# Patient Record
Sex: FEMALE | Race: OTHER | Employment: UNEMPLOYED | URBAN - METROPOLITAN AREA
[De-identification: names, ages, dates, MRNs, and addresses within clinical notes are randomized per-mention and may not be internally consistent; named-entity substitution may affect disease eponyms.]

---

## 2017-01-11 ENCOUNTER — APPOINTMENT (OUTPATIENT)
Dept: PHYSICAL THERAPY | Facility: HOSPITAL | Age: 52
End: 2017-01-11
Attending: ORTHOPAEDIC SURGERY
Payer: MEDICAID

## 2017-01-13 ENCOUNTER — OFFICE VISIT (OUTPATIENT)
Dept: PHYSICAL THERAPY | Facility: HOSPITAL | Age: 52
End: 2017-01-13
Attending: ORTHOPAEDIC SURGERY
Payer: MEDICAID

## 2017-01-13 PROCEDURE — 97110 THERAPEUTIC EXERCISES: CPT

## 2017-01-13 NOTE — PROGRESS NOTES
Dx: Rotator cuff tendinitis, right(M75.81); cervical strain(S16.1XXA)       Authorized # of Visit: 5/7         Next MD visit: none scheduled  Fall Risk: standard         Precautions: n/a           Medication Changes since last visit?: No  Subjective: Patie

## 2017-02-07 ENCOUNTER — OFFICE VISIT (OUTPATIENT)
Dept: OTOLARYNGOLOGY | Facility: CLINIC | Age: 52
End: 2017-02-07

## 2017-02-07 VITALS
TEMPERATURE: 97 F | HEART RATE: 76 BPM | WEIGHT: 156 LBS | HEIGHT: 63 IN | DIASTOLIC BLOOD PRESSURE: 60 MMHG | RESPIRATION RATE: 20 BRPM | BODY MASS INDEX: 27.64 KG/M2 | SYSTOLIC BLOOD PRESSURE: 90 MMHG

## 2017-02-07 DIAGNOSIS — R42 DIZZINESS: Primary | ICD-10-CM

## 2017-02-07 PROCEDURE — 99212 OFFICE O/P EST SF 10 MIN: CPT | Performed by: OTOLARYNGOLOGY

## 2017-02-07 PROCEDURE — 99214 OFFICE O/P EST MOD 30 MIN: CPT | Performed by: OTOLARYNGOLOGY

## 2017-02-07 RX ORDER — FLUTICASONE PROPIONATE 50 MCG
1 SPRAY, SUSPENSION (ML) NASAL 2 TIMES DAILY
Qty: 1 BOTTLE | Refills: 3 | Status: SHIPPED | OUTPATIENT
Start: 2017-02-07 | End: 2017-09-21

## 2017-02-07 RX ORDER — MONTELUKAST SODIUM 10 MG/1
10 TABLET ORAL NIGHTLY
Qty: 30 TABLET | Refills: 3 | Status: SHIPPED | OUTPATIENT
Start: 2017-02-07 | End: 2017-09-21

## 2017-02-07 RX ORDER — LORATADINE 10 MG/1
10 TABLET ORAL DAILY
Qty: 30 TABLET | Refills: 3 | Status: SHIPPED | OUTPATIENT
Start: 2017-02-07 | End: 2017-09-21

## 2017-02-07 RX ORDER — CLONAZEPAM 0.5 MG/1
0.5 TABLET ORAL AS NEEDED
Qty: 30 TABLET | Refills: 6 | Status: SHIPPED | OUTPATIENT
Start: 2017-02-07 | End: 2017-08-03

## 2017-03-02 ENCOUNTER — TELEPHONE (OUTPATIENT)
Dept: INTERNAL MEDICINE CLINIC | Facility: CLINIC | Age: 52
End: 2017-03-02

## 2017-03-02 ENCOUNTER — HOSPITAL ENCOUNTER (OUTPATIENT)
Dept: MAMMOGRAPHY | Facility: HOSPITAL | Age: 52
Discharge: HOME | End: 2017-03-02
Attending: OBSTETRICS & GYNECOLOGY
Payer: MEDICAID

## 2017-03-02 ENCOUNTER — APPOINTMENT (OUTPATIENT)
Dept: ULTRASOUND IMAGING | Facility: HOSPITAL | Age: 52
End: 2017-03-02
Attending: OBSTETRICS & GYNECOLOGY
Payer: MEDICAID

## 2017-03-02 ENCOUNTER — HOSPITAL ENCOUNTER (OUTPATIENT)
Dept: ULTRASOUND IMAGING | Facility: HOSPITAL | Age: 52
Discharge: HOME | End: 2017-03-02
Attending: OBSTETRICS & GYNECOLOGY
Payer: MEDICAID

## 2017-03-02 DIAGNOSIS — N92.1 MENORRHAGIA WITH IRREGULAR CYCLE: ICD-10-CM

## 2017-03-02 DIAGNOSIS — Z12.31 ENCOUNTER FOR SCREENING MAMMOGRAM FOR BREAST CANCER: ICD-10-CM

## 2017-03-02 DIAGNOSIS — N92.1 MENORRHAGIA WITH IRREGULAR CYCLE: Primary | ICD-10-CM

## 2017-03-02 PROCEDURE — 76856 US EXAM PELVIC COMPLETE: CPT

## 2017-03-02 PROCEDURE — 76830 TRANSVAGINAL US NON-OB: CPT

## 2017-03-02 PROCEDURE — 77067 SCR MAMMO BI INCL CAD: CPT

## 2017-03-02 NOTE — TELEPHONE ENCOUNTER
Patient reports that she needs to schedule her follow-up appt with  [facility] to discuss US results. She has an appt with Dr Amelia Wilson on 3/9 but is hoping to get an appt on the books with Dr Waller Sever [facility] ASAP.  Needs referral.

## 2017-03-03 NOTE — PROGRESS NOTES
Quick Note:    Pelvic u/s stable from previous u/s.  If periods continue to be prolonged, need e-bx  ______

## 2017-03-09 ENCOUNTER — OFFICE VISIT (OUTPATIENT)
Dept: INTERNAL MEDICINE CLINIC | Facility: CLINIC | Age: 52
End: 2017-03-09

## 2017-03-09 VITALS
TEMPERATURE: 98 F | WEIGHT: 155.63 LBS | BODY MASS INDEX: 28 KG/M2 | SYSTOLIC BLOOD PRESSURE: 117 MMHG | HEART RATE: 90 BPM | DIASTOLIC BLOOD PRESSURE: 84 MMHG

## 2017-03-09 DIAGNOSIS — N39.0 URINARY TRACT INFECTION, SITE UNSPECIFIED: Primary | ICD-10-CM

## 2017-03-09 DIAGNOSIS — H53.8 BLURRY VISION: ICD-10-CM

## 2017-03-09 DIAGNOSIS — Z71.2 ENCOUNTER TO DISCUSS TEST RESULTS: ICD-10-CM

## 2017-03-09 LAB
BILIRUBIN: NEGATIVE
GLUCOSE (URINE DIPSTICK): NEGATIVE MG/DL
KETONES (URINE DIPSTICK): NEGATIVE MG/DL
LEUKOCYTES: POSITIVE
MULTISTIX LOT#: ABNORMAL NUMERIC
NITRITE, URINE: NEGATIVE
OCCULT BLOOD: POSITIVE
PH, URINE: 6 (ref 4.5–8)
PROTEIN (URINE DIPSTICK): NEGATIVE MG/DL
SPECIFIC GRAVITY: 1.02 (ref 1–1.03)
URINE-COLOR: YELLOW
UROBILINOGEN,SEMI-QN: 0 MG/DL (ref 0–1.9)

## 2017-03-09 PROCEDURE — 99214 OFFICE O/P EST MOD 30 MIN: CPT | Performed by: INTERNAL MEDICINE

## 2017-03-09 PROCEDURE — 81002 URINALYSIS NONAUTO W/O SCOPE: CPT | Performed by: INTERNAL MEDICINE

## 2017-03-09 PROCEDURE — 99212 OFFICE O/P EST SF 10 MIN: CPT | Performed by: INTERNAL MEDICINE

## 2017-03-10 NOTE — PROGRESS NOTES
HPI:    Patient ID: Anupam Luna is a 46year old female. The patient requests a referral to OB/GYN to discuss test results. HPI  UTI  This is a chronic problem. Episode onset: more than 3 months ago. The problem occurs constantly.  Pertinent negat Current Outpatient Prescriptions:  ClonazePAM 0.5 MG Oral Tab Take 1 tablet (0.5 mg total) by mouth as needed. Disp: 30 tablet Rfl: 6   Montelukast Sodium 10 MG Oral Tab Take 1 tablet (10 mg total) by mouth nightly.  Disp: 30 tablet Rfl: 3   loratadine 10 for bilateral flank tenderness on palpation. Plan:  - Drink plenty of liquids, especially water. Empty your bladder soon after intercourse. Also, drink a full glass of water to help flush bacteria. Avoid potentially irritating feminine products.    - The

## 2017-03-15 ENCOUNTER — TELEPHONE (OUTPATIENT)
Dept: INTERNAL MEDICINE CLINIC | Facility: CLINIC | Age: 52
End: 2017-03-15

## 2017-03-15 NOTE — TELEPHONE ENCOUNTER
Patient would like to know the results of her labs and if you were going to send her antibiotics. Please advise.

## 2017-03-16 NOTE — TELEPHONE ENCOUNTER
Urine culture was negative meaning that the patient does not have a urinary tract infection. No need for antibiotics. Please call the patient with the results. Thanks.

## 2017-03-17 NOTE — TELEPHONE ENCOUNTER
Actions Requested: MD notification/ possible advice  Situation/Background   Problem: left side of back/ intermittent stabbing   Onset: yesterday   Associated Symptoms: none   History of Same: none   Precipitated By: Pt states that she was shoveling snow th

## 2017-03-20 ENCOUNTER — TELEPHONE (OUTPATIENT)
Dept: INTERNAL MEDICINE CLINIC | Facility: CLINIC | Age: 52
End: 2017-03-20

## 2017-03-20 NOTE — TELEPHONE ENCOUNTER
Pt states she thinks has the flu, states all she needs is antibiotic zpack or if possible to add her to schedule, pt only wants to see PCP. Please advise.

## 2017-03-20 NOTE — TELEPHONE ENCOUNTER
Actions Requested:   Pt is requesting medication to pharmacy. Situation/Background    Problem:  Cough / body aches   Onset:  Wednesday   Associated Symptoms:     History of Same:    Precipitated By:    Aggravating/Alleviating Factors:    Timing:    Possi

## 2017-03-21 ENCOUNTER — TELEPHONE (OUTPATIENT)
Dept: INTERNAL MEDICINE CLINIC | Facility: CLINIC | Age: 52
End: 2017-03-21

## 2017-03-21 ENCOUNTER — TELEPHONE (OUTPATIENT)
Dept: OPHTHALMOLOGY | Facility: CLINIC | Age: 52
End: 2017-03-21

## 2017-03-21 ENCOUNTER — OFFICE VISIT (OUTPATIENT)
Dept: INTERNAL MEDICINE CLINIC | Facility: CLINIC | Age: 52
End: 2017-03-21

## 2017-03-21 VITALS
OXYGEN SATURATION: 99 % | TEMPERATURE: 98 F | HEART RATE: 89 BPM | BODY MASS INDEX: 28 KG/M2 | DIASTOLIC BLOOD PRESSURE: 69 MMHG | WEIGHT: 158 LBS | HEIGHT: 63 IN | SYSTOLIC BLOOD PRESSURE: 100 MMHG

## 2017-03-21 DIAGNOSIS — IMO0001 COLD: Primary | ICD-10-CM

## 2017-03-21 DIAGNOSIS — J34.89 SINUS PRESSURE: ICD-10-CM

## 2017-03-21 PROCEDURE — 99213 OFFICE O/P EST LOW 20 MIN: CPT | Performed by: INTERNAL MEDICINE

## 2017-03-21 PROCEDURE — 99212 OFFICE O/P EST SF 10 MIN: CPT | Performed by: INTERNAL MEDICINE

## 2017-03-21 RX ORDER — OMEGA-3 FATTY ACIDS/FISH OIL 300-1000MG
CAPSULE ORAL
COMMUNITY
End: 2017-08-17 | Stop reason: ALTCHOICE

## 2017-03-21 NOTE — TELEPHONE ENCOUNTER
Pt stts she was in today to see the MD   Pt was advised a pain med for her arm for her left arm will be sent to the pharm   Please advise

## 2017-03-21 NOTE — PROGRESS NOTES
Sick x 6 days   Had a fever now gone  Also wants results mammogram, urine culture, etc     03/21/17  1500   BP: 100/69   Pulse: 89   Temp: 97.6 °F (36.4 °C)   TempSrc: Oral   Height: 5' 3\" (1.6 m)   Weight: 158 lb (71.668 kg)   SpO2: 99%         Body mass

## 2017-03-21 NOTE — PATIENT INSTRUCTIONS
chlortrimaton 4 mg twice a day  Sudafed 120 mg twice a day  Robitussin as directed    Carpal Tunnel Brace Left Hand Medium

## 2017-03-22 RX ORDER — NAPROXEN 500 MG/1
500 TABLET ORAL 2 TIMES DAILY
Qty: 180 TABLET | Refills: 3 | Status: SHIPPED | OUTPATIENT
Start: 2017-03-22 | End: 2017-03-29

## 2017-03-22 RX ORDER — AMOXICILLIN AND CLAVULANATE POTASSIUM 500; 125 MG/1; MG/1
1 TABLET, FILM COATED ORAL
Qty: 20 TABLET | Refills: 0 | Status: SHIPPED | OUTPATIENT
Start: 2017-03-22 | End: 2017-04-18

## 2017-03-22 RX ORDER — BENZONATATE 100 MG/1
100 CAPSULE ORAL 3 TIMES DAILY PRN
Qty: 15 CAPSULE | Refills: 0 | Status: SHIPPED | OUTPATIENT
Start: 2017-03-22 | End: 2017-08-17 | Stop reason: ALTCHOICE

## 2017-03-22 NOTE — TELEPHONE ENCOUNTER
Pt stts you sent over an Rx for Naproxen but pt can't take it because of the aspirin in it. Hard on her stomach. Pt would like something else please plus something for her cold and cough. Feeling much worse.

## 2017-03-23 RX ORDER — TRAMADOL HYDROCHLORIDE 50 MG/1
50 TABLET ORAL EVERY 6 HOURS PRN
Qty: 30 TABLET | Refills: 1 | Status: SHIPPED | OUTPATIENT
Start: 2017-03-23 | End: 2017-08-17 | Stop reason: ALTCHOICE

## 2017-03-29 ENCOUNTER — OFFICE VISIT (OUTPATIENT)
Dept: INTERNAL MEDICINE CLINIC | Facility: CLINIC | Age: 52
End: 2017-03-29

## 2017-03-29 VITALS
HEIGHT: 63 IN | SYSTOLIC BLOOD PRESSURE: 101 MMHG | RESPIRATION RATE: 16 BRPM | HEART RATE: 82 BPM | WEIGHT: 155.5 LBS | BODY MASS INDEX: 27.55 KG/M2 | DIASTOLIC BLOOD PRESSURE: 68 MMHG

## 2017-03-29 DIAGNOSIS — J06.9 ACUTE URI: Primary | ICD-10-CM

## 2017-03-29 PROCEDURE — 99214 OFFICE O/P EST MOD 30 MIN: CPT | Performed by: INTERNAL MEDICINE

## 2017-03-29 PROCEDURE — 99212 OFFICE O/P EST SF 10 MIN: CPT | Performed by: INTERNAL MEDICINE

## 2017-03-29 RX ORDER — CODEINE PHOSPHATE AND GUAIFENESIN 10; 100 MG/5ML; MG/5ML
10 SOLUTION ORAL EVERY 6 HOURS PRN
Qty: 236 ML | Refills: 0 | Status: SHIPPED | OUTPATIENT
Start: 2017-03-29 | End: 2017-08-17 | Stop reason: ALTCHOICE

## 2017-03-29 RX ORDER — AZITHROMYCIN 250 MG/1
TABLET, FILM COATED ORAL
Qty: 1 PACKAGE | Refills: 0 | Status: SHIPPED | OUTPATIENT
Start: 2017-03-29 | End: 2017-04-18

## 2017-03-29 NOTE — PROGRESS NOTES
HPI:    Patient ID: Artem Castillo is a 46year old female. HPI  Cough  This is a new problem. Episode onset: 3 weeks agao. The problem has been gradually improving. The problem occurs every few minutes. The cough is non-productive.  Associated symptom equivalent per week       Comment: socially          Current Outpatient Prescriptions:  azithromycin (ZITHROMAX Z-EMY) 250 MG Oral Tab Take 2 tablets first day. Then 1 tablet daily after.  Disp: 1 Package Rfl: 0   guaiFENesin-codeine (CHERATUSSIN AC) 100-10 moist. No oropharyngeal exudate or posterior oropharyngeal erythema. Eyes: Conjunctivae are normal.   Neck: Normal range of motion. Cardiovascular: Normal rate, regular rhythm and normal heart sounds. Exam reveals no gallop and no friction rub.     No daily after. guaiFENesin-codeine (CHERATUSSIN AC) 100-10 MG/5ML Oral Solution 236 mL 0      Sig: Take 10 mL by mouth every 6 (six) hours as needed for cough.        Imaging & Referrals:  None     ID#[id number]  By signing my name below, I [address],

## 2017-04-03 ENCOUNTER — TELEPHONE (OUTPATIENT)
Dept: INTERNAL MEDICINE CLINIC | Facility: CLINIC | Age: 52
End: 2017-04-03

## 2017-04-03 NOTE — TELEPHONE ENCOUNTER
[facility] ok to [facility] [phone] today only or [phone].

## 2017-04-03 NOTE — TELEPHONE ENCOUNTER
Pt calling regarding having issue with her knee and pt state that tramadol does not help with the inflammation. Pt does not have name of medication. .. please advise

## 2017-04-07 NOTE — TELEPHONE ENCOUNTER
[facility], please transfer to x  until 5 today or x [phone] anytime.

## 2017-04-11 NOTE — TELEPHONE ENCOUNTER
[phone].     Copied and placed on 2nd Medication question on 4/3/17

## 2017-04-11 NOTE — TELEPHONE ENCOUNTER
[phone]. Also informed to call us back if still needed. Taken from 2nd Medication question on 4/3/17:    Pt calling regarding why she was giving a fungus medication. .. please advise

## 2017-04-18 ENCOUNTER — OFFICE VISIT (OUTPATIENT)
Dept: OPHTHALMOLOGY | Facility: CLINIC | Age: 52
End: 2017-04-18

## 2017-04-18 DIAGNOSIS — H40.003 GLAUCOMA SUSPECT OF BOTH EYES: Primary | ICD-10-CM

## 2017-04-18 DIAGNOSIS — H52.4 BILATERAL PRESBYOPIA: ICD-10-CM

## 2017-04-18 PROCEDURE — 99212 OFFICE O/P EST SF 10 MIN: CPT | Performed by: OPHTHALMOLOGY

## 2017-04-18 PROCEDURE — 99243 OFF/OP CNSLTJ NEW/EST LOW 30: CPT | Performed by: OPHTHALMOLOGY

## 2017-04-18 RX ORDER — FLUCONAZOLE 150 MG/1
TABLET ORAL
COMMUNITY
Start: 2017-03-29 | End: 2017-08-17 | Stop reason: ALTCHOICE

## 2017-04-18 NOTE — PATIENT INSTRUCTIONS
Glaucoma suspect of both eyes  Patient is a glaucoma suspect due to increased cupping of the optic nerves in both eyes. Patient had normal glaucoma diagnostics last year. IOP is normal today.   There is no diagnosis of glaucoma at this time, but johanna voss

## 2017-04-18 NOTE — PROGRESS NOTES
Favian Valdez is a 46year old female. HPI:     HPI     Consult    Additional comments: Consult per Dr. Crystal Nunez           Comments   Pt denies any blurred vision OU at distance and is happy with her OTC reading glasses. Pt has no ocular complaints. mouth. Disp:  Rfl:    ClonazePAM 0.5 MG Oral Tab Take 1 tablet (0.5 mg total) by mouth as needed. Disp: 30 tablet Rfl: 6   Montelukast Sodium 10 MG Oral Tab Take 1 tablet (10 mg total) by mouth nightly.  Disp: 30 tablet Rfl: 3   loratadine 10 MG Oral Tab Ta Good rim, Temporal crescent    C/D Ratio 0.75 0.65    Macula RPE hypo superonasal to fovea Normal    Vessels Normal Normal    Periphery Normal Normal            Refraction     Wearing Rx      Sphere Cylinder   Right +1.25 Sphere   Left +1.25 Sphere       T

## 2017-05-09 ENCOUNTER — TELEPHONE (OUTPATIENT)
Dept: ORTHOPEDICS CLINIC | Facility: CLINIC | Age: 52
End: 2017-05-09

## 2017-08-03 RX ORDER — CLONAZEPAM 0.5 MG/1
TABLET ORAL
Qty: 90 TABLET | Refills: 0 | Status: SHIPPED | OUTPATIENT
Start: 2017-08-03 | End: 2017-10-29

## 2017-08-03 NOTE — TELEPHONE ENCOUNTER
LOV 2/7/17  Last refill 2/17/17   RTC 3-6 months  appt scheduled 8/8/17  Please advise regarding refill request

## 2017-08-08 ENCOUNTER — OFFICE VISIT (OUTPATIENT)
Dept: OTOLARYNGOLOGY | Facility: CLINIC | Age: 52
End: 2017-08-08

## 2017-08-08 VITALS
SYSTOLIC BLOOD PRESSURE: 100 MMHG | WEIGHT: 162.19 LBS | HEIGHT: 63 IN | DIASTOLIC BLOOD PRESSURE: 70 MMHG | BODY MASS INDEX: 28.74 KG/M2 | TEMPERATURE: 98 F

## 2017-08-08 DIAGNOSIS — R42 DIZZINESS: Primary | ICD-10-CM

## 2017-08-08 PROCEDURE — 99212 OFFICE O/P EST SF 10 MIN: CPT | Performed by: OTOLARYNGOLOGY

## 2017-08-08 PROCEDURE — 99214 OFFICE O/P EST MOD 30 MIN: CPT | Performed by: OTOLARYNGOLOGY

## 2017-08-08 NOTE — PROGRESS NOTES
Magda Paredes is a 46year old female.   Patient presents with:  Dizziness: diagnosied with vestibular disorder 2001, feeling both eyes and thong still jumping      HISTORY OF PRESENT ILLNESS  She presents with complaints of snoring, nasal obstruction as Spouse name:                       Years of education:                 Number of children:               Occupational History  Occupation          Employer            Comment               LPN                                         Social History Main Top depression. Integumentary Negative Frequent skin infections, pigment change and rash. Hema/Lymph Negative Easy bleeding and easy bruising.            PHYSICAL EXAM    /70 (BP Location: Left arm)   Temp 97.7 °F (36.5 °C) (Tympanic)   Ht 5' 3\" (1.6 3  •  Fluticasone Propionate 50 MCG/ACT Nasal Suspension, 1 spray by Nasal route 2 (two) times daily. , Disp: 1 Bottle, Rfl: 3  •  OMEPRAZOLE 40 MG Oral Capsule Delayed Release, TAKE ONE CAPSULE BY MOUTH ONCE DAILY, Disp: 30 capsule, Rfl: 3  •  fluconazole

## 2017-08-17 ENCOUNTER — OFFICE VISIT (OUTPATIENT)
Dept: INTERNAL MEDICINE CLINIC | Facility: CLINIC | Age: 52
End: 2017-08-17

## 2017-08-17 VITALS
HEART RATE: 77 BPM | WEIGHT: 159.38 LBS | TEMPERATURE: 98 F | DIASTOLIC BLOOD PRESSURE: 78 MMHG | SYSTOLIC BLOOD PRESSURE: 111 MMHG | BODY MASS INDEX: 28 KG/M2

## 2017-08-17 DIAGNOSIS — J06.9 URI, ACUTE: Primary | ICD-10-CM

## 2017-08-17 DIAGNOSIS — R53.83 FATIGUE, UNSPECIFIED TYPE: ICD-10-CM

## 2017-08-17 PROCEDURE — 99214 OFFICE O/P EST MOD 30 MIN: CPT | Performed by: INTERNAL MEDICINE

## 2017-08-17 PROCEDURE — 99212 OFFICE O/P EST SF 10 MIN: CPT | Performed by: INTERNAL MEDICINE

## 2017-08-17 RX ORDER — AZITHROMYCIN 250 MG/1
TABLET, FILM COATED ORAL
Qty: 1 PACKAGE | Refills: 0 | Status: SHIPPED | OUTPATIENT
Start: 2017-08-17 | End: 2017-09-21

## 2017-08-18 NOTE — PROGRESS NOTES
HPI:    Patient ID: Penny Romo is a 46year old female. Sore Throat    This is a new problem. The current episode started in the past 7 days. There has been no fever. Associated symptoms include abdominal pain.  Pertinent negatives include no short nightly. Disp: 30 tablet Rfl: 3   loratadine 10 MG Oral Tab Take 1 tablet (10 mg total) by mouth daily. Disp: 30 tablet Rfl: 3   Fluticasone Propionate 50 MCG/ACT Nasal Suspension 1 spray by Nasal route 2 (two) times daily.  Disp: 1 Bottle Rfl: 3   OMEPRAZO , ASSAY, THYROID STIM HORMONE            Orders Placed This Encounter      CBC With Differential With Platelet      Comp Metabolic Panel (14)      TSH [E]    Meds This Visit:  Signed Prescriptions Disp Refills    azithromycin (ZITHROMAX Z-EMY) 250 MG Oral

## 2017-09-02 ENCOUNTER — LAB ENCOUNTER (OUTPATIENT)
Dept: LAB | Facility: HOSPITAL | Age: 52
End: 2017-09-02
Attending: INTERNAL MEDICINE
Payer: COMMERCIAL

## 2017-09-02 DIAGNOSIS — R53.83 FATIGUE, UNSPECIFIED TYPE: ICD-10-CM

## 2017-09-02 LAB
ALBUMIN SERPL BCP-MCNC: 4.4 G/DL (ref 3.5–4.8)
ALBUMIN/GLOB SERPL: 1.5 {RATIO} (ref 1–2)
ALP SERPL-CCNC: 69 U/L (ref 32–100)
ALT SERPL-CCNC: 48 U/L (ref 14–54)
ANION GAP SERPL CALC-SCNC: 8 MMOL/L (ref 0–18)
AST SERPL-CCNC: 31 U/L (ref 15–41)
BASOPHILS # BLD: 0 K/UL (ref 0–0.2)
BASOPHILS NFR BLD: 0 %
BILIRUB SERPL-MCNC: 0.5 MG/DL (ref 0.3–1.2)
BUN SERPL-MCNC: 15 MG/DL (ref 8–20)
BUN/CREAT SERPL: 20.8 (ref 10–20)
CALCIUM SERPL-MCNC: 9.6 MG/DL (ref 8.5–10.5)
CHLORIDE SERPL-SCNC: 106 MMOL/L (ref 95–110)
CO2 SERPL-SCNC: 25 MMOL/L (ref 22–32)
CREAT SERPL-MCNC: 0.72 MG/DL (ref 0.5–1.5)
EOSINOPHIL # BLD: 0.2 K/UL (ref 0–0.7)
EOSINOPHIL NFR BLD: 4 %
ERYTHROCYTE [DISTWIDTH] IN BLOOD BY AUTOMATED COUNT: 13.8 % (ref 11–15)
GLOBULIN PLAS-MCNC: 3 G/DL (ref 2.5–3.7)
GLUCOSE SERPL-MCNC: 90 MG/DL (ref 70–99)
HCT VFR BLD AUTO: 42.1 % (ref 35–48)
HGB BLD-MCNC: 13.8 G/DL (ref 12–16)
LYMPHOCYTES # BLD: 2.1 K/UL (ref 1–4)
LYMPHOCYTES NFR BLD: 37 %
MCH RBC QN AUTO: 28.6 PG (ref 27–32)
MCHC RBC AUTO-ENTMCNC: 32.8 G/DL (ref 32–37)
MCV RBC AUTO: 87.1 FL (ref 80–100)
MONOCYTES # BLD: 0.5 K/UL (ref 0–1)
MONOCYTES NFR BLD: 8 %
NEUTROPHILS # BLD AUTO: 2.9 K/UL (ref 1.8–7.7)
NEUTROPHILS NFR BLD: 51 %
OSMOLALITY UR CALC.SUM OF ELEC: 288 MOSM/KG (ref 275–295)
PLATELET # BLD AUTO: 218 K/UL (ref 140–400)
PMV BLD AUTO: 8.9 FL (ref 7.4–10.3)
POTASSIUM SERPL-SCNC: 3.9 MMOL/L (ref 3.3–5.1)
PROT SERPL-MCNC: 7.4 G/DL (ref 5.9–8.4)
RBC # BLD AUTO: 4.84 M/UL (ref 3.7–5.4)
SODIUM SERPL-SCNC: 139 MMOL/L (ref 136–144)
TSH SERPL-ACNC: 2.17 UIU/ML (ref 0.45–5.33)
WBC # BLD AUTO: 5.7 K/UL (ref 4–11)

## 2017-09-02 PROCEDURE — 36415 COLL VENOUS BLD VENIPUNCTURE: CPT

## 2017-09-02 PROCEDURE — 85025 COMPLETE CBC W/AUTO DIFF WBC: CPT

## 2017-09-02 PROCEDURE — 84443 ASSAY THYROID STIM HORMONE: CPT

## 2017-09-02 PROCEDURE — 80053 COMPREHEN METABOLIC PANEL: CPT

## 2017-09-20 ENCOUNTER — TELEPHONE (OUTPATIENT)
Dept: INTERNAL MEDICINE CLINIC | Facility: CLINIC | Age: 52
End: 2017-09-20

## 2017-09-21 ENCOUNTER — HOSPITAL ENCOUNTER (OUTPATIENT)
Age: 52
Discharge: HOME | End: 2017-09-21
Attending: FAMILY MEDICINE
Payer: COMMERCIAL

## 2017-09-21 VITALS
HEART RATE: 73 BPM | WEIGHT: 160 LBS | SYSTOLIC BLOOD PRESSURE: 110 MMHG | DIASTOLIC BLOOD PRESSURE: 66 MMHG | TEMPERATURE: 98 F | BODY MASS INDEX: 28.35 KG/M2 | OXYGEN SATURATION: 100 % | HEIGHT: 63 IN | RESPIRATION RATE: 16 BRPM

## 2017-09-21 DIAGNOSIS — H00.011 HORDEOLUM EXTERNUM OF RIGHT UPPER EYELID: Primary | ICD-10-CM

## 2017-09-21 PROCEDURE — 99203 OFFICE O/P NEW LOW 30 MIN: CPT

## 2017-09-21 PROCEDURE — 99213 OFFICE O/P EST LOW 20 MIN: CPT

## 2017-09-21 RX ORDER — ERYTHROMYCIN 5 MG/G
1 OINTMENT OPHTHALMIC NIGHTLY
Qty: 1 G | Refills: 0 | Status: SHIPPED | OUTPATIENT
Start: 2017-09-21 | End: 2017-09-28

## 2017-09-21 NOTE — ED INITIAL ASSESSMENT (HPI)
Several days of pain, redness, and swelling to right upper eyelid. Mild tearing. Does not wear corrective lenses. Mild discharge from eye.

## 2017-09-21 NOTE — ED PROVIDER NOTES
Patient Seen in: [address] [facility]    History   Patient presents with:  Eyelid Pain    Stated Complaint: Rt eye swelling     HPI    Patient here with complaints of right upper eyelid swelling and pain.   First noted swelling on S Wt 72.6 kg   SpO2 100%   BMI 28.34 kg/m²     Right Eye Chart Acuity: 20/25, Uncorrected  Left Eye Chart Acuity: 20/25, Uncorrected    Physical Exam    General examination no acute distress alert and oriented ×3    Right upper eyelid stye noted in the middl

## 2017-09-22 NOTE — TELEPHONE ENCOUNTER
Recent blood test for TSH /thyroid test was normal at 2.17. Normal comprehensive metabolic panel. Normal CBC. Please call the patient with the results. Thanks.

## 2017-09-30 ENCOUNTER — HOSPITAL ENCOUNTER (OUTPATIENT)
Age: 52
Discharge: HOME | End: 2017-09-30
Payer: COMMERCIAL

## 2017-09-30 VITALS
HEIGHT: 63 IN | OXYGEN SATURATION: 99 % | HEART RATE: 74 BPM | BODY MASS INDEX: 28 KG/M2 | TEMPERATURE: 98 F | DIASTOLIC BLOOD PRESSURE: 78 MMHG | WEIGHT: 158 LBS | RESPIRATION RATE: 18 BRPM | SYSTOLIC BLOOD PRESSURE: 112 MMHG

## 2017-09-30 DIAGNOSIS — N30.00 ACUTE CYSTITIS WITHOUT HEMATURIA: Primary | ICD-10-CM

## 2017-09-30 PROCEDURE — 99213 OFFICE O/P EST LOW 20 MIN: CPT

## 2017-09-30 PROCEDURE — 81002 URINALYSIS NONAUTO W/O SCOPE: CPT

## 2017-09-30 RX ORDER — CIPROFLOXACIN 500 MG/1
500 TABLET, FILM COATED ORAL 2 TIMES DAILY
Qty: 14 TABLET | Refills: 0 | Status: SHIPPED | OUTPATIENT
Start: 2017-09-30 | End: 2017-10-07

## 2017-09-30 NOTE — ED PROVIDER NOTES
Patient presents with:  Urinary Symptoms (urologic)      HPI:     Porter Liz is a 46year old female who presents with a chief complaint of urea, urgency, frequency, and suprapubic tenderness with urination for the past week.   She states the symptoms /78   Pulse 74   Temp 97.8 °F (36.6 °C) (Oral)   Resp 18   Ht 160 cm (5' 3\")   Wt 71.7 kg   SpO2 99%   BMI 27.99 kg/m²   GENERAL: well developed, well nourished, well hydrated, no distress  SKIN: good skin turgor, no rashes  HEENT:normocephalic, your condition today.     Follow Up with:  Hillary Ceron MD  [address]  [phone]    Schedule an appointment as soon as possible for a visit in 2 days

## 2017-09-30 NOTE — ED INITIAL ASSESSMENT (HPI)
1 week of intermittent dysuria. More constant yesterday. + frequency. Lower abdominal pain and pressure. + chills. No nausea. Dizzy and headache at times. Denies hematuria.

## 2017-10-06 RX ORDER — OMEPRAZOLE 40 MG/1
CAPSULE, DELAYED RELEASE ORAL
Qty: 30 CAPSULE | Refills: 3 | Status: SHIPPED | OUTPATIENT
Start: 2017-10-06 | End: 2018-04-19

## 2017-10-06 NOTE — TELEPHONE ENCOUNTER
Pending Prescriptions Disp Refills    OMEPRAZOLE 40 MG Oral Capsule Delayed Release [Pharmacy Med Name: OMEPRAZOLE DR 40MG  CAP] 30 capsule 3     Sig: TAKE ONE CAPSULE BY MOUTH ONCE DAILY           Refill approved per protocol.

## 2017-10-06 NOTE — TELEPHONE ENCOUNTER
Refill Protocol Appointment Criteria  · Appointment scheduled in the past 12 months or in the next 3 months  Recent Outpatient Visits            1 month ago [place], [facility], [facility], [address], Miryam Jj MD    Office Visit    1 month ag

## 2017-11-02 RX ORDER — CLONAZEPAM 0.5 MG/1
TABLET ORAL
Qty: 180 TABLET | Refills: 0 | Status: SHIPPED | OUTPATIENT
Start: 2017-11-02 | End: 2018-04-17

## 2018-04-09 ENCOUNTER — OFFICE VISIT (OUTPATIENT)
Dept: INTERNAL MEDICINE CLINIC | Facility: CLINIC | Age: 53
End: 2018-04-09

## 2018-04-09 VITALS
DIASTOLIC BLOOD PRESSURE: 84 MMHG | BODY MASS INDEX: 28.23 KG/M2 | HEART RATE: 92 BPM | SYSTOLIC BLOOD PRESSURE: 116 MMHG | HEIGHT: 63 IN | WEIGHT: 159.31 LBS | TEMPERATURE: 99 F

## 2018-04-09 DIAGNOSIS — Z00.00 ANNUAL PHYSICAL EXAM: Primary | ICD-10-CM

## 2018-04-09 DIAGNOSIS — Z12.39 SCREENING FOR BREAST CANCER: ICD-10-CM

## 2018-04-09 DIAGNOSIS — M25.562 CHRONIC PAIN OF LEFT KNEE: ICD-10-CM

## 2018-04-09 DIAGNOSIS — G89.29 CHRONIC PAIN OF LEFT KNEE: ICD-10-CM

## 2018-04-09 DIAGNOSIS — Z12.83 SCREENING FOR SKIN CANCER: ICD-10-CM

## 2018-04-09 PROBLEM — D25.9 FIBROID UTERUS: Status: ACTIVE | Noted: 2018-04-09

## 2018-04-09 PROBLEM — J34.89 SINUS PRESSURE: Status: RESOLVED | Noted: 2017-03-21 | Resolved: 2018-04-09

## 2018-04-09 PROBLEM — N83.201 RIGHT OVARIAN CYST: Status: ACTIVE | Noted: 2018-04-09

## 2018-04-09 PROBLEM — IMO0001 COLD: Status: RESOLVED | Noted: 2017-03-21 | Resolved: 2018-04-09

## 2018-04-09 PROCEDURE — 99396 PREV VISIT EST AGE 40-64: CPT | Performed by: INTERNAL MEDICINE

## 2018-04-09 RX ORDER — BENZONATATE 200 MG/1
200 CAPSULE ORAL 3 TIMES DAILY PRN
Qty: 30 CAPSULE | Refills: 0 | Status: SHIPPED | OUTPATIENT
Start: 2018-04-09 | End: 2018-04-19

## 2018-04-09 NOTE — PROGRESS NOTES
Marilia Marshall is a 46year old female. Patient presents with:  Physical      HPI:     HPI  51-year-old female here for annual physical. She reports she has been feeling sharp pain in the occipital area for last 5 months.   She was diagnosed with glaucom 0.0 oz/week     Comment: socially       REVIEW OF SYSTEMS:   DESIRAE  GENERAL HEALTH: No fevers, chills, sweats, fatigue. VISION: has pain and pressure in both eyes, worse in rt eyes. Has difficulty while looking at bright light.   HEENT: No decreased hearing rhythm, without murmur, rubs or gallops. GI: normal bowel sounds ,no tenderness or masses, no guarding or rigidity.   EXTREMITIES: No pedal edema, bilateral pulses normal.  MUSK: b/l knee crepitus present, no swelling or effusions  BACK: normal curvature,n MG Oral Cap; Take 1 capsule (200 mg total) by mouth 3 (three) times daily as needed for cough. The patient indicates understanding of these issues and agrees to the plan.               Anshu Jansen MD  4/9/2018  6:59 PM

## 2018-04-12 ENCOUNTER — OFFICE VISIT (OUTPATIENT)
Dept: OPHTHALMOLOGY | Facility: CLINIC | Age: 53
End: 2018-04-12

## 2018-04-12 ENCOUNTER — TELEPHONE (OUTPATIENT)
Dept: OPHTHALMOLOGY | Facility: CLINIC | Age: 53
End: 2018-04-12

## 2018-04-12 DIAGNOSIS — H57.13 EYE PAIN, BILATERAL: Primary | ICD-10-CM

## 2018-04-12 PROCEDURE — 99213 OFFICE O/P EST LOW 20 MIN: CPT | Performed by: OPHTHALMOLOGY

## 2018-04-12 PROCEDURE — 99212 OFFICE O/P EST SF 10 MIN: CPT | Performed by: OPHTHALMOLOGY

## 2018-04-12 NOTE — PROGRESS NOTES
Shima Ritchie is a 46year old female. HPI:     HPI     Eye Pain     In both eyes (OD>OS). Characterized as burning, irritation and pressure. Pain was noted as 6/10. Occurring intermittently. It is worse throughout the day. Duration of 3 months. Known Allergies    ROS:       PHYSICAL EXAM:     Base Eye Exam     Visual Acuity (Snellen - Linear)       Right Left    Dist sc 20/20 -2 20/25          Pachymetry (12/19/2015)       Right Left    Thickness 613/-5 636/-6          Pupils       Pupils    Righ encounter     Follow up instructions:  Return in about 4 weeks (around 5/12/2018) for EE and photos.     4/12/2018  Scribed by: Sameer Collado MD

## 2018-04-12 NOTE — ASSESSMENT & PLAN NOTE
No cause found for eye pain. Reassured patient that there is no infection, inflammation, foreign body, abrasion or increased pressure in the eyes.      Discussed with patient that she is due for a dilated eye exam and photos in the near future due to stat

## 2018-04-12 NOTE — PATIENT INSTRUCTIONS
Eye pain, bilateral   No cause found for eye pain. Reassured patient that there is no infection, inflammation, foreign body, abrasion or increased pressure in the eyes.      Discussed with patient that she is due for a dilated eye exam and photos in the ne

## 2018-04-17 ENCOUNTER — HOSPITAL ENCOUNTER (OUTPATIENT)
Dept: GENERAL RADIOLOGY | Age: 53
Discharge: HOME | End: 2018-04-17
Attending: INTERNAL MEDICINE
Payer: MEDICAID

## 2018-04-17 ENCOUNTER — LAB ENCOUNTER (OUTPATIENT)
Dept: LAB | Age: 53
End: 2018-04-17
Attending: INTERNAL MEDICINE
Payer: MEDICAID

## 2018-04-17 ENCOUNTER — HOSPITAL ENCOUNTER (OUTPATIENT)
Dept: MAMMOGRAPHY | Age: 53
Discharge: HOME | End: 2018-04-17
Attending: INTERNAL MEDICINE
Payer: MEDICAID

## 2018-04-17 ENCOUNTER — OFFICE VISIT (OUTPATIENT)
Dept: OBGYN CLINIC | Facility: CLINIC | Age: 53
End: 2018-04-17

## 2018-04-17 ENCOUNTER — OFFICE VISIT (OUTPATIENT)
Dept: OTOLARYNGOLOGY | Facility: CLINIC | Age: 53
End: 2018-04-17

## 2018-04-17 VITALS
SYSTOLIC BLOOD PRESSURE: 108 MMHG | WEIGHT: 160 LBS | BODY MASS INDEX: 28 KG/M2 | HEART RATE: 82 BPM | DIASTOLIC BLOOD PRESSURE: 71 MMHG

## 2018-04-17 VITALS
TEMPERATURE: 99 F | BODY MASS INDEX: 28.35 KG/M2 | SYSTOLIC BLOOD PRESSURE: 118 MMHG | HEIGHT: 63 IN | WEIGHT: 160 LBS | DIASTOLIC BLOOD PRESSURE: 70 MMHG

## 2018-04-17 DIAGNOSIS — Z12.39 SCREENING FOR BREAST CANCER: ICD-10-CM

## 2018-04-17 DIAGNOSIS — G89.29 CHRONIC PAIN OF LEFT KNEE: ICD-10-CM

## 2018-04-17 DIAGNOSIS — Z00.00 ANNUAL PHYSICAL EXAM: ICD-10-CM

## 2018-04-17 DIAGNOSIS — M25.562 CHRONIC PAIN OF LEFT KNEE: ICD-10-CM

## 2018-04-17 DIAGNOSIS — Z01.419 ENCOUNTER FOR GYNECOLOGICAL EXAMINATION WITHOUT ABNORMAL FINDING: Primary | ICD-10-CM

## 2018-04-17 DIAGNOSIS — N39.3 SUI (STRESS URINARY INCONTINENCE, FEMALE): ICD-10-CM

## 2018-04-17 DIAGNOSIS — N95.1 MENOPAUSAL HOT FLUSHES: ICD-10-CM

## 2018-04-17 DIAGNOSIS — Z12.4 SCREENING FOR MALIGNANT NEOPLASM OF CERVIX: ICD-10-CM

## 2018-04-17 DIAGNOSIS — R42 DIZZINESS: Primary | ICD-10-CM

## 2018-04-17 PROCEDURE — 80061 LIPID PANEL: CPT

## 2018-04-17 PROCEDURE — 77067 SCR MAMMO BI INCL CAD: CPT | Performed by: INTERNAL MEDICINE

## 2018-04-17 PROCEDURE — 99214 OFFICE O/P EST MOD 30 MIN: CPT | Performed by: OTOLARYNGOLOGY

## 2018-04-17 PROCEDURE — 99212 OFFICE O/P EST SF 10 MIN: CPT | Performed by: OTOLARYNGOLOGY

## 2018-04-17 PROCEDURE — 73564 X-RAY EXAM KNEE 4 OR MORE: CPT | Performed by: INTERNAL MEDICINE

## 2018-04-17 PROCEDURE — 80053 COMPREHEN METABOLIC PANEL: CPT

## 2018-04-17 PROCEDURE — 84443 ASSAY THYROID STIM HORMONE: CPT

## 2018-04-17 PROCEDURE — 99396 PREV VISIT EST AGE 40-64: CPT | Performed by: OBSTETRICS & GYNECOLOGY

## 2018-04-17 PROCEDURE — 82306 VITAMIN D 25 HYDROXY: CPT

## 2018-04-17 PROCEDURE — 36415 COLL VENOUS BLD VENIPUNCTURE: CPT

## 2018-04-17 PROCEDURE — 83036 HEMOGLOBIN GLYCOSYLATED A1C: CPT

## 2018-04-17 PROCEDURE — 85025 COMPLETE CBC W/AUTO DIFF WBC: CPT

## 2018-04-17 RX ORDER — FLUTICASONE PROPIONATE 50 MCG
1 SPRAY, SUSPENSION (ML) NASAL 2 TIMES DAILY
Qty: 1 BOTTLE | Refills: 3 | Status: SHIPPED | OUTPATIENT
Start: 2018-04-17 | End: 2018-08-04

## 2018-04-17 RX ORDER — CLONAZEPAM 0.5 MG/1
TABLET ORAL
Qty: 180 TABLET | Refills: 0 | Status: SHIPPED | OUTPATIENT
Start: 2018-04-17 | End: 2018-11-04

## 2018-04-17 RX ORDER — LORATADINE 10 MG/1
10 TABLET ORAL DAILY
Qty: 30 TABLET | Refills: 3 | Status: SHIPPED | OUTPATIENT
Start: 2018-04-17 | End: 2019-07-11

## 2018-04-17 RX ORDER — MONTELUKAST SODIUM 10 MG/1
10 TABLET ORAL NIGHTLY
Qty: 30 TABLET | Refills: 3 | Status: SHIPPED | OUTPATIENT
Start: 2018-04-17 | End: 2019-06-10 | Stop reason: ALTCHOICE

## 2018-04-18 NOTE — H&P
HPI:  The patient is a 24-year-old female who presents for woman examination today. Multiple gynecologic complaints. The patient's been complaining of experiencing hot flashes. She says it occurs multiple times a day.   She denies any decreased libido Other Topics Concern    Caffeine Concern Yes    Comment: coffee, 1cup/day    Pt has a pacemaker No    Pt has a defibrillator No    Reaction to local anesthetic No     Social History Narrative   None on file       FAMILY HISTORY:  Family History   Probl nourished  Head/Face: normocephalic  Neck/Thyroid: thyroid symmetric, no thyromegaly, no nodules, no adenopathy  Heart: Regular rate and rhythm   Lungs: clear to ascultation bilaterally   Lymphatic:no abnormal supraclavicular or axillary adenopathy is note within normal limits. We will be able to discuss SSRIs versus HRT pending results for mammogram.  3. Breast Health:     1. Reviewed currently guidelines with the patient and to start Mammograms at age 43  3.  Reviewed monthly self breast exams with the pat

## 2018-04-18 NOTE — PROGRESS NOTES
Maureen Tapia is a 46year old female.   Patient presents with:  Nose Problem: pt reports nasal congestion, can not breath through nose, post nasal drip, phlegm, need refill on medication,   Dizziness: pt reports dizziness still      HISTORY OF PRESENT I Ménière's.   4/17/18 since I last saw her she has been dealing with a lot of stress anxiety and has increased her use of clonazepam to 0.5 mg p.o. twice daily as needed. Generally she uses 1 tablet in fact will go days without using a tablet at all.   She SYSTEMS    System Neg/Pos Details   Constitutional Negative Fatigue, fever and weight loss. ENMT Negative Drooling. Eyes Negative Blurred vision and vision changes. Respiratory Negative Dyspnea and wheezing.    Cardio Negative Chest pain, irregular he Normal. Septum -Normal  Turbinates - Right: Normal, Left: Normal.       Current Outpatient Prescriptions:   •  ClonazePAM 0.5 MG Oral Tab, TAKE ONE TABLET BY MOUTH ONCE DAILY AS NEEDED, Disp: 180 tablet, Rfl: 0  •  Montelukast Sodium 10 MG Oral Tab, Take 1

## 2018-04-19 ENCOUNTER — HOSPITAL ENCOUNTER (OUTPATIENT)
Dept: ULTRASOUND IMAGING | Facility: HOSPITAL | Age: 53
Discharge: HOME | End: 2018-04-19
Attending: INTERNAL MEDICINE
Payer: MEDICAID

## 2018-04-19 ENCOUNTER — HOSPITAL ENCOUNTER (OUTPATIENT)
Dept: MAMMOGRAPHY | Facility: HOSPITAL | Age: 53
Discharge: HOME | End: 2018-04-19
Attending: INTERNAL MEDICINE
Payer: MEDICAID

## 2018-04-19 ENCOUNTER — TELEPHONE (OUTPATIENT)
Dept: INTERNAL MEDICINE CLINIC | Facility: CLINIC | Age: 53
End: 2018-04-19

## 2018-04-19 DIAGNOSIS — R92.8 ABNORMAL MAMMOGRAM OF RIGHT BREAST: ICD-10-CM

## 2018-04-19 PROCEDURE — 77065 DX MAMMO INCL CAD UNI: CPT | Performed by: INTERNAL MEDICINE

## 2018-04-19 PROCEDURE — 76642 ULTRASOUND BREAST LIMITED: CPT | Performed by: INTERNAL MEDICINE

## 2018-04-19 RX ORDER — OMEPRAZOLE 40 MG/1
CAPSULE, DELAYED RELEASE ORAL
Qty: 30 CAPSULE | Refills: 3 | Status: SHIPPED | OUTPATIENT
Start: 2018-04-19 | End: 2019-03-19

## 2018-04-19 RX ORDER — BENZONATATE 200 MG/1
200 CAPSULE ORAL 3 TIMES DAILY PRN
Qty: 30 CAPSULE | Refills: 0 | Status: SHIPPED | OUTPATIENT
Start: 2018-04-19 | End: 2018-04-29

## 2018-04-23 ENCOUNTER — TELEPHONE (OUTPATIENT)
Dept: GASTROENTEROLOGY | Facility: CLINIC | Age: 53
End: 2018-04-23

## 2018-04-23 ENCOUNTER — OFFICE VISIT (OUTPATIENT)
Dept: GASTROENTEROLOGY | Facility: CLINIC | Age: 53
End: 2018-04-23

## 2018-04-23 VITALS
BODY MASS INDEX: 28.92 KG/M2 | HEIGHT: 62.5 IN | SYSTOLIC BLOOD PRESSURE: 116 MMHG | WEIGHT: 161.19 LBS | HEART RATE: 87 BPM | DIASTOLIC BLOOD PRESSURE: 77 MMHG

## 2018-04-23 DIAGNOSIS — R13.19 OTHER DYSPHAGIA: ICD-10-CM

## 2018-04-23 DIAGNOSIS — K21.00 GASTROESOPHAGEAL REFLUX DISEASE WITH ESOPHAGITIS: ICD-10-CM

## 2018-04-23 DIAGNOSIS — R13.10 DYSPHAGIA, UNSPECIFIED TYPE: Primary | ICD-10-CM

## 2018-04-23 DIAGNOSIS — K21.9 GASTROESOPHAGEAL REFLUX DISEASE, ESOPHAGITIS PRESENCE NOT SPECIFIED: ICD-10-CM

## 2018-04-23 DIAGNOSIS — R14.0 ABDOMINAL BLOATING: Primary | ICD-10-CM

## 2018-04-23 PROCEDURE — 99212 OFFICE O/P EST SF 10 MIN: CPT | Performed by: INTERNAL MEDICINE

## 2018-04-23 PROCEDURE — 99244 OFF/OP CNSLTJ NEW/EST MOD 40: CPT | Performed by: INTERNAL MEDICINE

## 2018-04-23 RX ORDER — POLYETHYLENE GLYCOL 3350, SODIUM CHLORIDE, SODIUM BICARBONATE, POTASSIUM CHLORIDE 420; 11.2; 5.72; 1.48 G/4L; G/4L; G/4L; G/4L
POWDER, FOR SOLUTION ORAL
Qty: 1 BOTTLE | Refills: 0 | Status: SHIPPED | OUTPATIENT
Start: 2018-04-23 | End: 2018-08-04

## 2018-04-23 NOTE — H&P
[address] [facility] [address] Gastroenterology                                                                                                  Clinic History and Physical     Pa Diabetes Sister      type 2 - half (P)   • Colon Cancer Neg      close relative   • Glaucoma Neg    • Macular degeneration Neg       Social History: Smoking status: Current Some Day Smoker                                                    Packs/day: 0.10 161 lb 3.2 oz (73.1 kg), unknown if currently breastfeeding.     Gen- Patient appears comfortable and in no acute discomfort  HEENT: the sclera appears anicteric, oropharynx clear, mucus membranes appear moist  CV- regular rate and rhythm, the extremities a hospitalization, surgical intervention, or even death. I also specifically mentioned the miss rate of upper endoscopy of 5-10% in the best of all circumstances.   The patient has agreed to sign an informed consent and elected to proceed with procedure with

## 2018-04-23 NOTE — PATIENT INSTRUCTIONS
1. Schedule endoscopy (MAC) at hospital (dysphagia/GERD). With possible dilation  2. Bowel prep to flush out stools  3.  After bowel , the next day start miralax 1-2x a day (1 capful with water)

## 2018-04-23 NOTE — TELEPHONE ENCOUNTER
Scheduled for:  Colonoscopy 51348--Aok Dr. Martinez Garza this will be a 45 min procedure  Provider Name: Dr. Mratinez Garza  Date:  5/29/18  Location:  [facility]  Sedation:  MAC  Time:  1878 (pt is aware to arrive at [phone])   Prep:  Nothing to eat after midnight   Nothing to drin

## 2018-04-25 PROBLEM — R14.0 ABDOMINAL BLOATING: Status: ACTIVE | Noted: 2018-04-25

## 2018-04-25 PROBLEM — K21.00 GASTROESOPHAGEAL REFLUX DISEASE WITH ESOPHAGITIS: Status: ACTIVE | Noted: 2018-04-25

## 2018-04-25 PROBLEM — R13.19 OTHER DYSPHAGIA: Status: ACTIVE | Noted: 2018-04-25

## 2018-05-11 ENCOUNTER — HOSPITAL ENCOUNTER (OUTPATIENT)
Age: 53
Discharge: HOME | End: 2018-05-11
Payer: MEDICAID

## 2018-05-11 VITALS
HEIGHT: 63 IN | WEIGHT: 158 LBS | RESPIRATION RATE: 18 BRPM | OXYGEN SATURATION: 99 % | DIASTOLIC BLOOD PRESSURE: 71 MMHG | HEART RATE: 99 BPM | TEMPERATURE: 98 F | SYSTOLIC BLOOD PRESSURE: 114 MMHG | BODY MASS INDEX: 28 KG/M2

## 2018-05-11 DIAGNOSIS — N30.90 CYSTITIS: Primary | ICD-10-CM

## 2018-05-11 PROCEDURE — 87077 CULTURE AEROBIC IDENTIFY: CPT | Performed by: NURSE PRACTITIONER

## 2018-05-11 PROCEDURE — 99214 OFFICE O/P EST MOD 30 MIN: CPT

## 2018-05-11 PROCEDURE — 87086 URINE CULTURE/COLONY COUNT: CPT | Performed by: NURSE PRACTITIONER

## 2018-05-11 PROCEDURE — 87186 SC STD MICRODIL/AGAR DIL: CPT | Performed by: NURSE PRACTITIONER

## 2018-05-11 PROCEDURE — 81002 URINALYSIS NONAUTO W/O SCOPE: CPT

## 2018-05-11 RX ORDER — CIPROFLOXACIN 500 MG/1
500 TABLET, FILM COATED ORAL 2 TIMES DAILY
Qty: 14 TABLET | Refills: 0 | Status: SHIPPED | OUTPATIENT
Start: 2018-05-11 | End: 2018-05-18

## 2018-05-11 NOTE — ED INITIAL ASSESSMENT (HPI)
PATIENT ARRIVED AMBULATORY TO ROOM C/O SYMPTOMS OF A UTI THAT STARTED 2 WEEKS AGO WORSENING OVER THE PAST 2 DAYS. +FEVERS YESTERDAY. +LOWER ABDOMINAL PAIN AND LOWER BACK PAIN. +PAINFUL URINATION. +FREQUENCY. +URGENCY. NO HEMATURIA.

## 2018-05-11 NOTE — ED PROVIDER NOTES
Patient presents with:  Urinary Symptoms (urologic)      HPI:     Penny Romo is a 46year old female who presents with a chief complaint of dysuria, urgency, and frequency for the past couple weeks. She states the symptoms gradually became worse.   Osvaldo Melvin Temp 98.4 °F (36.9 °C) (Oral)   Resp 18   Ht 160 cm (5' 3\")   Wt 71.7 kg   SpO2 99%   BMI 27.99 kg/m²   GENERAL: well developed, well nourished, well hydrated, no distress  SKIN: good skin turgor, no rashes  HEENT:normocephalic, ears and throat are clear and discussed with patient. See AVS for detailed discharge instructions for your condition today. Follow Up with:  Martha Pham MD  [address] [address] [phone]    Schedule an appointment as soon as possible for a visit in 2

## 2018-05-12 ENCOUNTER — OFFICE VISIT (OUTPATIENT)
Dept: OPHTHALMOLOGY | Facility: CLINIC | Age: 53
End: 2018-05-12

## 2018-05-12 ENCOUNTER — OFFICE VISIT (OUTPATIENT)
Dept: OBGYN CLINIC | Facility: CLINIC | Age: 53
End: 2018-05-12

## 2018-05-12 VITALS
HEART RATE: 81 BPM | DIASTOLIC BLOOD PRESSURE: 66 MMHG | BODY MASS INDEX: 28 KG/M2 | SYSTOLIC BLOOD PRESSURE: 100 MMHG | WEIGHT: 156 LBS

## 2018-05-12 DIAGNOSIS — H52.4 BILATERAL PRESBYOPIA: ICD-10-CM

## 2018-05-12 DIAGNOSIS — H40.003 GLAUCOMA SUSPECT OF BOTH EYES: Primary | ICD-10-CM

## 2018-05-12 DIAGNOSIS — N95.1 MENOPAUSAL SYMPTOMS: Primary | ICD-10-CM

## 2018-05-12 PROCEDURE — 92014 COMPRE OPH EXAM EST PT 1/>: CPT | Performed by: OPHTHALMOLOGY

## 2018-05-12 PROCEDURE — 99213 OFFICE O/P EST LOW 20 MIN: CPT | Performed by: OBSTETRICS & GYNECOLOGY

## 2018-05-12 PROCEDURE — 92250 FUNDUS PHOTOGRAPHY W/I&R: CPT | Performed by: OPHTHALMOLOGY

## 2018-05-12 NOTE — H&P
HPI:  The patient is a 80-year-old female who presents with her  to discuss menopausal symptoms. The patient states that she has been experiencing hot flushes, difficulty sleeping and decreased libido.   She states her decreased libido is affectin Topics   Smoking status: Current Some Day Smoker  0.10 Packs/day  For 20.00 Years     Types: Cigarettes    Smokeless tobacco: Never Used    Alcohol use Yes  0.0 oz/week     Comment: socially    Drug use: No    Sexual activity: No     Other Topics Concern vaginal discharge, vaginal itching  Musculoskeletal:  denies back pain. Skin/Breast:  Denies any breast pain, lumps, or discharge. Neurological:  denies headaches, extremity weakness  Psychiatric: denies depression or anxiety.        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 The patient states that she finds pleasure when sexually active and has energy over the weekend however after her long days at work she is too tired to be intimate.   We did discuss changing their schedule to be more conduciv

## 2018-05-12 NOTE — PATIENT INSTRUCTIONS
Glaucoma suspect of both eyes  Discussed with patient that she is a glaucoma suspect based on increased cupping of the optic nerves in both eyes. Retinal photos taken today to document optic nerves. Glaucoma diagnostic testing ordered.   Will not start me [facility] [address] [employer] [place]. All rights reserved. This information is not intended as a substitute for professional medical care. Always follow your healthcare professional's instructions.         Treating Glaucoma    Treatment can prevent or Truett Elo

## 2018-05-19 ENCOUNTER — NURSE ONLY (OUTPATIENT)
Dept: OPHTHALMOLOGY | Facility: CLINIC | Age: 53
End: 2018-05-19

## 2018-05-19 DIAGNOSIS — H40.003 GLAUCOMA SUSPECT OF BOTH EYES: ICD-10-CM

## 2018-05-19 PROCEDURE — 92083 EXTENDED VISUAL FIELD XM: CPT | Performed by: OPHTHALMOLOGY

## 2018-05-19 PROCEDURE — 92133 CPTRZD OPH DX IMG PST SGM ON: CPT | Performed by: OPHTHALMOLOGY

## 2018-05-29 ENCOUNTER — TELEPHONE (OUTPATIENT)
Dept: GASTROENTEROLOGY | Facility: CLINIC | Age: 53
End: 2018-05-29

## 2018-05-29 DIAGNOSIS — K21.9 GASTROESOPHAGEAL REFLUX DISEASE, ESOPHAGITIS PRESENCE NOT SPECIFIED: ICD-10-CM

## 2018-05-29 DIAGNOSIS — R13.10 DYSPHAGIA, UNSPECIFIED TYPE: Primary | ICD-10-CM

## 2018-05-29 NOTE — TELEPHONE ENCOUNTER
Pt calling to inform staff that EGD/CANCELLED, pt requesting a cb to kapil, pls call at:[phone],thanks.

## 2018-05-29 NOTE — TELEPHONE ENCOUNTER
Scheduled for:  Colonoscopy 89277  Provider Name: Dr. Lawrence Graham  Date:  8/8/18  Location:  [facility]  Sedation:  MAC  Time:  1000 (pt is aware to arrive at 0900)   Prep:  NPO after midnight, mailed new instructions on 5/30/18  Meds/Allergies Reconciled?:  Physician

## 2018-05-29 NOTE — PROGRESS NOTES
Lisa Laehy is a 46year old female. HPI:     HPI     Patient is here for a glaucoma work up with HVF and OCT, no M.D.     Last edited by Tiki Mcfarland on 5/19/2018 11:06 AM. (History)        Patient History:  Past Medical History:   Diagnosis Date 3       Allergies:  No Known Allergies    ROS:       PHYSICAL EXAM:      Not recorded           ASSESSMENT/PLAN:     Diagnoses and Plan:     Glaucoma suspect of both eyes  Normal VF, OCT, OU.       No orders of the defined types were placed in this encounte

## 2018-05-29 NOTE — PROGRESS NOTES
Pradeep Cole is a 46year old female. HPI:     HPI     Patient is here for a complete exam and photos. Patient still has occasional pain behind both eyes. She denies any burning or irritation.       Last edited by Matthew Bynum OT on 5/12/2018  9 Nasal Suspension 1 spray by Nasal route 2 (two) times daily.  Disp: 1 Bottle Rfl: 3       Allergies:  No Known Allergies    ROS:       PHYSICAL EXAM:     Base Eye Exam     Visual Acuity (Snellen - Linear)       Right Left    Dist sc 20/20 20/20    Near cc 2 Treadwell Visual Field - OU - Both Eyes      OCT, Optic Nerve - OU - Both Eyes    Meds This Visit:    No prescriptions requested or ordered in this encounter     Follow up instructions:  Return for Next avail.  VF and OCT with no MD, If glaucoma diagnostics

## 2018-08-04 ENCOUNTER — OFFICE VISIT (OUTPATIENT)
Dept: INTERNAL MEDICINE CLINIC | Facility: CLINIC | Age: 53
End: 2018-08-04
Payer: MEDICAID

## 2018-08-04 VITALS
BODY MASS INDEX: 28.71 KG/M2 | SYSTOLIC BLOOD PRESSURE: 121 MMHG | DIASTOLIC BLOOD PRESSURE: 81 MMHG | HEART RATE: 88 BPM | WEIGHT: 160 LBS | HEIGHT: 62.5 IN

## 2018-08-04 DIAGNOSIS — R51.9 HEADACHE DISORDER: ICD-10-CM

## 2018-08-04 DIAGNOSIS — H00.015 HORDEOLUM EXTERNUM OF LEFT LOWER EYELID: ICD-10-CM

## 2018-08-04 DIAGNOSIS — Z87.898 HISTORY OF SEIZURE: Primary | ICD-10-CM

## 2018-08-04 PROCEDURE — 99212 OFFICE O/P EST SF 10 MIN: CPT | Performed by: INTERNAL MEDICINE

## 2018-08-04 PROCEDURE — 99214 OFFICE O/P EST MOD 30 MIN: CPT | Performed by: INTERNAL MEDICINE

## 2018-08-04 NOTE — PROGRESS NOTES
Patient ID: Lisa Leahy is a 48year old female. Patient presents with:  Headache: started Monday tried motrin did not help much, no pain today       HISTORY OF PRESENT ILLNESS:   HPI  Patient presents for above.   Here with episodic headaches for th •  loratadine 10 MG Oral Tab, Take 1 tablet (10 mg total) by mouth daily. , Disp: 30 tablet, Rfl: 3    Allergies:No Known Allergies      Social History  Social History   Marital status:    Spouse name: N/A    Years of education: N/A  Number of childre · Keep area clean. · Handout given on effective home remedies. · Suspect this is probably the underlying cause to her symptoms for the past several days. Return if symptoms worsen or fail to improve.     Samuel Schuler MD  8/4/2018

## 2018-08-04 NOTE — PATIENT INSTRUCTIONS
1. Begin Excedrin Migraine. Headache, Unspecified    A number of things can cause headaches. The cause of your headache isn’t clear. But it doesn’t seem to be a sign of any serious illness. You could have a tension headache or a migraine headache.   Stre Follow up with your healthcare provider, or as advised. Talk with your provider if you have frequent headaches. He or she can help figure out a treatment plan.  By knowing the earliest signs of headache, and starting treatment right away, you may be able to · Apply a warm, damp towel to the affected eye for at least 5 minutes, 3 to 4 times a day for a week. Warm compresses open the pores and speed the healing. But if the compresses are too hot, they may burn your eyelid.   · Sometimes the sty will drain with t

## 2018-08-06 ENCOUNTER — OFFICE VISIT (OUTPATIENT)
Dept: DERMATOLOGY CLINIC | Facility: CLINIC | Age: 53
End: 2018-08-06
Payer: MEDICAID

## 2018-08-06 DIAGNOSIS — D22.9 MULTIPLE NEVI: ICD-10-CM

## 2018-08-06 DIAGNOSIS — D23.70 BENIGN NEOPLASM OF SKIN OF LOWER LIMB, INCLUDING HIP, UNSPECIFIED LATERALITY: ICD-10-CM

## 2018-08-06 DIAGNOSIS — D23.4 BENIGN NEOPLASM OF SCALP AND SKIN OF NECK: ICD-10-CM

## 2018-08-06 DIAGNOSIS — D23.30 BENIGN NEOPLASM OF SKIN OF FACE: ICD-10-CM

## 2018-08-06 DIAGNOSIS — L82.1 SEBORRHEIC KERATOSES: Primary | ICD-10-CM

## 2018-08-06 DIAGNOSIS — D23.5 BENIGN NEOPLASM OF SKIN OF TRUNK, EXCEPT SCROTUM: ICD-10-CM

## 2018-08-06 DIAGNOSIS — D23.60 BENIGN NEOPLASM OF SKIN OF UPPER LIMB, INCLUDING SHOULDER, UNSPECIFIED LATERALITY: ICD-10-CM

## 2018-08-06 PROCEDURE — 99202 OFFICE O/P NEW SF 15 MIN: CPT | Performed by: DERMATOLOGY

## 2018-08-06 PROCEDURE — 99212 OFFICE O/P EST SF 10 MIN: CPT | Performed by: DERMATOLOGY

## 2018-08-06 RX ORDER — KETOCONAZOLE 20 MG/G
CREAM TOPICAL
Qty: 60 G | Refills: 3 | Status: SHIPPED | OUTPATIENT
Start: 2018-08-06 | End: 2019-06-10 | Stop reason: ALTCHOICE

## 2018-08-08 ENCOUNTER — HOSPITAL ENCOUNTER (OUTPATIENT)
Facility: HOSPITAL | Age: 53
Setting detail: HOSPITAL OUTPATIENT SURGERY
Discharge: HOME | End: 2018-08-08
Attending: INTERNAL MEDICINE | Admitting: INTERNAL MEDICINE
Payer: MEDICAID

## 2018-08-08 ENCOUNTER — SURGERY (OUTPATIENT)
Age: 53
End: 2018-08-08

## 2018-08-08 ENCOUNTER — ANESTHESIA EVENT (OUTPATIENT)
Dept: ENDOSCOPY | Facility: HOSPITAL | Age: 53
End: 2018-08-08
Payer: MEDICAID

## 2018-08-08 ENCOUNTER — ANESTHESIA (OUTPATIENT)
Dept: ENDOSCOPY | Facility: HOSPITAL | Age: 53
End: 2018-08-08
Payer: MEDICAID

## 2018-08-08 DIAGNOSIS — R13.10 DYSPHAGIA, UNSPECIFIED TYPE: ICD-10-CM

## 2018-08-08 DIAGNOSIS — K21.9 GASTROESOPHAGEAL REFLUX DISEASE, ESOPHAGITIS PRESENCE NOT SPECIFIED: ICD-10-CM

## 2018-08-08 PROCEDURE — 43239 EGD BIOPSY SINGLE/MULTIPLE: CPT | Performed by: INTERNAL MEDICINE

## 2018-08-08 PROCEDURE — 0DB68ZX EXCISION OF STOMACH, VIA NATURAL OR ARTIFICIAL OPENING ENDOSCOPIC, DIAGNOSTIC: ICD-10-PCS | Performed by: INTERNAL MEDICINE

## 2018-08-08 RX ORDER — SODIUM CHLORIDE, SODIUM LACTATE, POTASSIUM CHLORIDE, CALCIUM CHLORIDE 600; 310; 30; 20 MG/100ML; MG/100ML; MG/100ML; MG/100ML
INJECTION, SOLUTION INTRAVENOUS CONTINUOUS
Status: DISCONTINUED | OUTPATIENT
Start: 2018-08-08 | End: 2018-08-08

## 2018-08-08 RX ADMIN — SODIUM CHLORIDE, SODIUM LACTATE, POTASSIUM CHLORIDE, CALCIUM CHLORIDE: 600; 310; 30; 20 INJECTION, SOLUTION INTRAVENOUS at 09:56:00

## 2018-08-08 RX ADMIN — SODIUM CHLORIDE, SODIUM LACTATE, POTASSIUM CHLORIDE, CALCIUM CHLORIDE: 600; 310; 30; 20 INJECTION, SOLUTION INTRAVENOUS at 10:06:00

## 2018-08-08 NOTE — OPERATIVE REPORT
ESOPHAGOGASTRODUODENOSCOPY (EGD) REPORT    Nerissa Skiff    LIZA 1965 Age 48year old   PCP Rishi Webster MD Endoscopist Carlos Esteves MD     Date of procedure: 18    Procedure: EGD w/cold biopsy    Pre-operative diagnosis: GERD, dyspha or mass seen. 2. Normal appearing stomach and duodenum. Recommend:  1. Take omeprazole 40mg/day consistently. 2. Await pathology. 3. Return to clinic in 6 months.    4. Behavioral changes for GERD.     >>>If biopsies were performed and you have not r

## 2018-08-08 NOTE — ANESTHESIA PREPROCEDURE EVALUATION
Anesthesia PreOp Note    HPI:     Lisa Leahy is a 48year old female who presents for preoperative consultation requested by: Bruna Montalvo MD    Date of Surgery: 8/8/2018    Procedure(s):  ESOPHAGOGASTRODUODENOSCOPY (EGD)  Indication: Maurilio Arzate MOUTH ONCE DAILY AS NEEDED (Patient taking differently: 0.5 mg. Take half daily ) Disp: 180 tablet Rfl: 0 Taking   Montelukast Sodium 10 MG Oral Tab Take 1 tablet (10 mg total) by mouth nightly.  Disp: 30 tablet Rfl: 3 Taking   loratadine 10 MG Oral Tab Juventino Graham comment: Hx of SVT- has not had an issue in years    Neuro/Psych      GI/Hepatic/Renal      Endo/Other    Abdominal              Anesthesia Plan:   ASA:  2  Plan:   General  Informed Consent Plan and Risks Discussed With:  Patient      I have informed Patricia

## 2018-08-08 NOTE — ANESTHESIA POSTPROCEDURE EVALUATION
Patient: Diane Green    Procedure Summary     Date:  08/08/18 Room / Location:  [address] [place] [address] ENDOSCOPY 01 / [address] [place] [address] ENDOSCOPY    Anesthesia Start:  3649 Anesthesia Stop:  2324    Procedure:  ESOPHAGOGASTRODUODENOSCOPY (EGD) (N/A ) Diagnosis:       Gastroesophageal

## 2018-08-08 NOTE — H&P
History & Physical Examination    Patient Name: [facility]  MRN: [id number]  CSN: [id number]  YOB: 1965    Diagnosis: TNWB,HORYBOHDN      Prescriptions Prior to Admission:  ketoconazole 2 % External Cream Apply to affected area 2 times oz/week    1 Standard drinks or equivalent per week         Comment: socially       SYSTEM Check if Physical Exam is Normal If not normal, please explain:   MYRNA [ Lyn Castaneda  [ Elisa Tee [ Kerwin Garcia [ Liam Nageotte [ Lester Hightower [ Louise Jung hav

## 2018-08-09 VITALS
HEIGHT: 63 IN | OXYGEN SATURATION: 100 % | DIASTOLIC BLOOD PRESSURE: 74 MMHG | BODY MASS INDEX: 28.35 KG/M2 | HEART RATE: 66 BPM | RESPIRATION RATE: 13 BRPM | WEIGHT: 160 LBS | SYSTOLIC BLOOD PRESSURE: 105 MMHG

## 2018-08-14 ENCOUNTER — TELEPHONE (OUTPATIENT)
Dept: GASTROENTEROLOGY | Facility: CLINIC | Age: 53
End: 2018-08-14

## 2018-08-15 ENCOUNTER — TELEPHONE (OUTPATIENT)
Dept: GASTROENTEROLOGY | Facility: CLINIC | Age: 53
End: 2018-08-15

## 2018-08-15 ENCOUNTER — HOSPITAL ENCOUNTER (OUTPATIENT)
Age: 53
Discharge: HOME | End: 2018-08-15
Attending: FAMILY MEDICINE
Payer: MEDICAID

## 2018-08-15 VITALS
RESPIRATION RATE: 18 BRPM | DIASTOLIC BLOOD PRESSURE: 62 MMHG | SYSTOLIC BLOOD PRESSURE: 104 MMHG | TEMPERATURE: 98 F | HEART RATE: 93 BPM | OXYGEN SATURATION: 99 % | HEIGHT: 63 IN

## 2018-08-15 DIAGNOSIS — J06.9 VIRAL UPPER RESPIRATORY TRACT INFECTION: ICD-10-CM

## 2018-08-15 DIAGNOSIS — N30.01 ACUTE CYSTITIS WITH HEMATURIA: Primary | ICD-10-CM

## 2018-08-15 LAB
BILIRUB UR QL STRIP: NEGATIVE
COLOR UR: YELLOW
GLUCOSE UR STRIP-MCNC: NEGATIVE MG/DL
KETONES UR STRIP-MCNC: NEGATIVE MG/DL
NITRITE UR QL STRIP: POSITIVE
PH UR STRIP: 5.5 [PH]
PROT UR STRIP-MCNC: 30 MG/DL
SP GR UR STRIP: 1.02
UROBILINOGEN UR STRIP-ACNC: <2 MG/DL

## 2018-08-15 PROCEDURE — 87086 URINE CULTURE/COLONY COUNT: CPT | Performed by: FAMILY MEDICINE

## 2018-08-15 PROCEDURE — 99214 OFFICE O/P EST MOD 30 MIN: CPT

## 2018-08-15 PROCEDURE — 87088 URINE BACTERIA CULTURE: CPT | Performed by: FAMILY MEDICINE

## 2018-08-15 PROCEDURE — 87186 SC STD MICRODIL/AGAR DIL: CPT | Performed by: FAMILY MEDICINE

## 2018-08-15 PROCEDURE — 81003 URINALYSIS AUTO W/O SCOPE: CPT

## 2018-08-15 RX ORDER — CIPROFLOXACIN 250 MG/1
250 TABLET, FILM COATED ORAL 2 TIMES DAILY
Qty: 14 TABLET | Refills: 0 | Status: SHIPPED | OUTPATIENT
Start: 2018-08-15 | End: 2018-08-22

## 2018-08-15 NOTE — TELEPHONE ENCOUNTER
GI Clinical Staff:   Please let patient know that letter was sent out; but please let her know that biopsies of stomach show NO infection. Nothing to do.  She should remain on omeprazole 40mg/day consistently for mild acid reflux changes noted in her esopha

## 2018-08-15 NOTE — ED PROVIDER NOTES
Patient Seen in: [facility]    History   Patient presents with:  Urinary Symptoms (urologic)    Stated Complaint: UTI    HPI    Patient complains of urinary frequency, urgency and dysuria that began last night  Patient de Comment: 2-3 cigarettes daily  Alcohol use: Yes           0.6 oz/week     Standard drinks or equivalent: 1 per week     Comment: socially      Review of Systems    Positive for stated complaint: UTI  Other systems are as noted in HPI.   Const PM    START taking these medications    Ciprofloxacin HCl 250 MG Oral Tab  Take 1 tablet (250 mg total) by mouth 2 (two) times daily. , Normal, Disp-14 tablet, R-0

## 2018-08-15 NOTE — ED INITIAL ASSESSMENT (HPI)
Patient reports dysuria since 0100 this am.  Took azo x 6 since this morning. Noticed some blood in her urine. Denies fevers but reports chills.

## 2018-08-17 NOTE — TELEPHONE ENCOUNTER
Pt contacted and reviewed Dr. Pj Rivero message below and results letter, she verbalized understanding of all and scheduled the following f/u appt, [facility] and told to arrive 15 mins earlier:  Date Time Provider Lin Chen   2/22/

## 2018-08-19 NOTE — PROGRESS NOTES
Magda Paredes is a 48year old female. HPI:     CC:  Patient presents with:  Lesion: former pt of SD. Please check irritated, itchy lesion to right cheek. Father with BCC and SCC. Lesion: lesion to right mid back.    Acrochordon: two skin tags to groi mg. Take half daily ) Disp: 180 tablet Rfl: 0   Montelukast Sodium 10 MG Oral Tab Take 1 tablet (10 mg total) by mouth nightly. Disp: 30 tablet Rfl: 3   loratadine 10 MG Oral Tab Take 1 tablet (10 mg total) by mouth daily.  Disp: 30 tablet Rfl: 3   Ciproflo vitals filed for this visit. HPI:    Patient presents with:  Lesion: former pt of SD. Please check irritated, itchy lesion to right cheek. Father with BCC and SCC. Lesion: lesion to right mid back.    Acrochordon: two skin tags to groin - asking for re face  Benign neoplasm of skin of trunk, except scrotum  Benign neoplasm of skin of lower limb, including hip, unspecified laterality  Benign neoplasm of skin of upper limb, including shoulder, unspecified laterality    See details on map.       Remarkable f

## 2018-10-08 ENCOUNTER — APPOINTMENT (OUTPATIENT)
Dept: CT IMAGING | Facility: HOSPITAL | Age: 53
End: 2018-10-08
Attending: EMERGENCY MEDICINE
Payer: MEDICAID

## 2018-10-08 ENCOUNTER — HOSPITAL ENCOUNTER (EMERGENCY)
Facility: HOSPITAL | Age: 53
Discharge: HOME | End: 2018-10-08
Attending: EMERGENCY MEDICINE
Payer: MEDICAID

## 2018-10-08 VITALS
RESPIRATION RATE: 18 BRPM | OXYGEN SATURATION: 97 % | BODY MASS INDEX: 28 KG/M2 | SYSTOLIC BLOOD PRESSURE: 123 MMHG | DIASTOLIC BLOOD PRESSURE: 78 MMHG | WEIGHT: 158.75 LBS | TEMPERATURE: 98 F | HEART RATE: 72 BPM

## 2018-10-08 DIAGNOSIS — R51.9 NONINTRACTABLE HEADACHE, UNSPECIFIED CHRONICITY PATTERN, UNSPECIFIED HEADACHE TYPE: Primary | ICD-10-CM

## 2018-10-08 PROCEDURE — 93010 ELECTROCARDIOGRAM REPORT: CPT | Performed by: EMERGENCY MEDICINE

## 2018-10-08 PROCEDURE — 85025 COMPLETE CBC W/AUTO DIFF WBC: CPT | Performed by: EMERGENCY MEDICINE

## 2018-10-08 PROCEDURE — 84484 ASSAY OF TROPONIN QUANT: CPT | Performed by: EMERGENCY MEDICINE

## 2018-10-08 PROCEDURE — 80048 BASIC METABOLIC PNL TOTAL CA: CPT | Performed by: EMERGENCY MEDICINE

## 2018-10-08 PROCEDURE — 93005 ELECTROCARDIOGRAM TRACING: CPT

## 2018-10-08 PROCEDURE — 70450 CT HEAD/BRAIN W/O DYE: CPT | Performed by: EMERGENCY MEDICINE

## 2018-10-08 PROCEDURE — 36415 COLL VENOUS BLD VENIPUNCTURE: CPT

## 2018-10-08 PROCEDURE — 81003 URINALYSIS AUTO W/O SCOPE: CPT | Performed by: EMERGENCY MEDICINE

## 2018-10-08 PROCEDURE — 99285 EMERGENCY DEPT VISIT HI MDM: CPT

## 2018-10-09 NOTE — ED PROVIDER NOTES
Patient Seen in: [facility] AND [facility] Emergency Department    History   Patient presents with:  Dizziness (neurologic)  Headache (neurologic)    Stated Complaint: Headache, dizzy     HPI    51-year-old female with a history of Ménière's disease who recently Vitals   BP 10/08/18 2014 101/70   Pulse 10/08/18 1901 82   Resp 10/08/18 1901 18   Temp 10/08/18 1901 98 °F (36.7 °C)   Temp src 10/08/18 1901 Temporal   SpO2 10/08/18 1901 99 %   O2 Device --        Current:/78   Pulse 78   Temp 98 °F (36.7 °C) (Te DIFFERENTIAL[[id number]]                              Final result                 Please view results for these tests on the individual orders.    URINALYSIS WITH CULTURE REFLEX   RAINBOW DRAW BLUE   RAINBOW DRAW LAVENDER   RAINBOW DRAW DARK GREEN   RAINBOW neurology follow-up as well. I have no clinical concern for non-visualized subarachnoid hemorrhage or other intercranial hemorrhage on the CT scan.             Disposition and Plan     Clinical Impression:  Nonintractable headache, unspecified chronicity p

## 2018-11-05 NOTE — TELEPHONE ENCOUNTER
Patient last office visit was 4/17/18 requesting for refill,last visit note pt is due to return to office,please advise,thank you.

## 2018-11-06 RX ORDER — CLONAZEPAM 0.5 MG/1
TABLET ORAL
Qty: 30 TABLET | Refills: 5 | Status: SHIPPED | OUTPATIENT
Start: 2018-11-06 | End: 2019-06-12

## 2019-03-19 RX ORDER — OMEPRAZOLE 40 MG/1
CAPSULE, DELAYED RELEASE ORAL
Qty: 30 CAPSULE | Refills: 2 | Status: SHIPPED | OUTPATIENT
Start: 2019-03-19 | End: 2019-11-01

## 2019-03-20 NOTE — TELEPHONE ENCOUNTER
Refill passed per [facility] protocol.   Refill Protocol Appointment Criteria  · Appointment scheduled in the past 12 months or in the next 3 months  Recent Outpatient Visits            7 months ago Seborrheic keratoses    [facility]

## 2019-04-22 ENCOUNTER — APPOINTMENT (OUTPATIENT)
Dept: LAB | Age: 54
End: 2019-04-22
Attending: INTERNAL MEDICINE
Payer: MEDICAID

## 2019-04-22 ENCOUNTER — OFFICE VISIT (OUTPATIENT)
Dept: INTERNAL MEDICINE CLINIC | Facility: CLINIC | Age: 54
End: 2019-04-22
Payer: MEDICAID

## 2019-04-22 VITALS
SYSTOLIC BLOOD PRESSURE: 102 MMHG | OXYGEN SATURATION: 100 % | HEART RATE: 84 BPM | BODY MASS INDEX: 29.44 KG/M2 | WEIGHT: 160 LBS | TEMPERATURE: 98 F | HEIGHT: 62 IN | DIASTOLIC BLOOD PRESSURE: 62 MMHG

## 2019-04-22 DIAGNOSIS — R53.1 WEAKNESS GENERALIZED: ICD-10-CM

## 2019-04-22 DIAGNOSIS — R42 DIZZINESS OF UNKNOWN CAUSE: ICD-10-CM

## 2019-04-22 DIAGNOSIS — R42 DIZZINESS OF UNKNOWN CAUSE: Primary | ICD-10-CM

## 2019-04-22 PROCEDURE — 80053 COMPREHEN METABOLIC PANEL: CPT

## 2019-04-22 PROCEDURE — 99213 OFFICE O/P EST LOW 20 MIN: CPT | Performed by: INTERNAL MEDICINE

## 2019-04-22 PROCEDURE — 83735 ASSAY OF MAGNESIUM: CPT

## 2019-04-22 PROCEDURE — 99212 OFFICE O/P EST SF 10 MIN: CPT | Performed by: INTERNAL MEDICINE

## 2019-04-22 PROCEDURE — 36415 COLL VENOUS BLD VENIPUNCTURE: CPT

## 2019-04-22 NOTE — PROGRESS NOTES
HPI    Patient ID: Marilin Berkowitz is a 48year old female.   Chief Complaint: Dizziness (Pt was having dizzyness, leg cramps and numbness, nausea after she got very upset)      HPI  66-year-old female with a history of dizziness/Ménière's disease who pres light-headedness. Negative for dizziness, tremors, seizures, syncope, facial asymmetry, speech difficulty, numbness and headaches. Hematological: Does not bruise/bleed easily. Psychiatric/Behavioral: Negative for sleep disturbance.        Past Medical H Physical Exam   Vitals reviewed. Constitutional: She is oriented to person, place, and time. She appears well-developed. No distress. HENT:   Head: Normocephalic and atraumatic.    Right Ear: Tympanic membrane and ear canal normal.   Left Ear: Tympani monitor symptoms. Further recommendations after labs. Follow Up:   Return if symptoms worsen or fail to improve. Preventive measures and further education provided to patient in after visit summary. Potential medication side effects discussed.

## 2019-04-23 ENCOUNTER — TELEPHONE (OUTPATIENT)
Dept: INTERNAL MEDICINE CLINIC | Facility: CLINIC | Age: 54
End: 2019-04-23

## 2019-04-23 NOTE — TELEPHONE ENCOUNTER
Lv Devlin pt was inform of your message below. Pt stated that she strongly believes that in her case she feels it is stress but also it might be hormonal. Pt stated that she does get some hot flashes she thinks she might be going through pre menopause .

## 2019-06-10 ENCOUNTER — OFFICE VISIT (OUTPATIENT)
Dept: INTERNAL MEDICINE CLINIC | Facility: CLINIC | Age: 54
End: 2019-06-10
Payer: MEDICAID

## 2019-06-10 VITALS
HEIGHT: 62 IN | BODY MASS INDEX: 30.24 KG/M2 | HEART RATE: 71 BPM | SYSTOLIC BLOOD PRESSURE: 113 MMHG | WEIGHT: 164.31 LBS | TEMPERATURE: 99 F | DIASTOLIC BLOOD PRESSURE: 77 MMHG

## 2019-06-10 DIAGNOSIS — Z00.00 ANNUAL PHYSICAL EXAM: Primary | ICD-10-CM

## 2019-06-10 DIAGNOSIS — B35.3 TINEA PEDIS OF BOTH FEET: ICD-10-CM

## 2019-06-10 DIAGNOSIS — Z12.39 SCREENING FOR BREAST CANCER: ICD-10-CM

## 2019-06-10 DIAGNOSIS — N81.10 BLADDER PROLAPSE, FEMALE, ACQUIRED: ICD-10-CM

## 2019-06-10 DIAGNOSIS — D17.0 LIPOMA OF FACE: ICD-10-CM

## 2019-06-10 DIAGNOSIS — E55.9 VITAMIN D DEFICIENCY: ICD-10-CM

## 2019-06-10 PROBLEM — K76.0 FATTY LIVER: Status: ACTIVE | Noted: 2019-06-10

## 2019-06-10 PROCEDURE — 99396 PREV VISIT EST AGE 40-64: CPT | Performed by: INTERNAL MEDICINE

## 2019-06-10 RX ORDER — KETOCONAZOLE 20 MG/G
1 CREAM TOPICAL 2 TIMES DAILY
Qty: 30 G | Refills: 2 | Status: SHIPPED | OUTPATIENT
Start: 2019-06-10 | End: 2021-06-14 | Stop reason: ALTCHOICE

## 2019-06-10 RX ORDER — MAGNESIUM 200 MG
1 TABLET ORAL DAILY
Qty: 90 TABLET | Refills: 3 | Status: SHIPPED | OUTPATIENT
Start: 2019-06-10 | End: 2019-07-10

## 2019-06-10 NOTE — PROGRESS NOTES
Rocío Martinez is a 48year old female. No chief complaint on file. HPI:     HPI  Patient is here for physical.  Has multiple complaints.   Complains of lumps in different parts of the body-on her thighs, back of the left thigh, both sides of the li Hospitalized 2 weeks   • Anesthesia complication     pt reports low BP   • Meniere disease    • SVT (supraventricular tachycardia) (HCC) 1995    Status post ablation   • Vestibular disorder    • Vitamin B12 deficiency     weekly B12 therapy      Past Surgi 164 lb 4.8 oz (74.5 kg)   BMI 30.05 kg/m²    Physical Exam   Constitutional: She is oriented to person, place, and time. She appears well-developed and well-nourished. No distress. HENT:   Head: Normocephalic and atraumatic.    Right Ear: External ear nor without masses or tenderness  Perineum: normal  Anus: no hemorrhoids,   Bimanual pelvic exam done      ASSESSMENT AND PLAN:       Diagnoses and all orders for this visit:    Annual physical exam  -     CBC WITH DIFFERENTIAL WITH PLATELET;  Future  -     COM

## 2019-06-24 ENCOUNTER — TELEPHONE (OUTPATIENT)
Dept: OTOLARYNGOLOGY | Facility: CLINIC | Age: 54
End: 2019-06-24

## 2019-06-24 NOTE — TELEPHONE ENCOUNTER
New prescription request has been made by your patient for the following medication. We are providing you with this information as part of the patients's request.  See 6-12-19 - 1st request was not approved.           Current Outpatient Medications:  Clona

## 2019-06-27 ENCOUNTER — OFFICE VISIT (OUTPATIENT)
Dept: OPHTHALMOLOGY | Facility: CLINIC | Age: 54
End: 2019-06-27
Payer: MEDICAID

## 2019-06-27 DIAGNOSIS — H40.003 GLAUCOMA SUSPECT OF BOTH EYES: Primary | ICD-10-CM

## 2019-06-27 DIAGNOSIS — H52.203 MYOPIA OF BOTH EYES WITH ASTIGMATISM AND PRESBYOPIA: ICD-10-CM

## 2019-06-27 DIAGNOSIS — H52.4 MYOPIA OF BOTH EYES WITH ASTIGMATISM AND PRESBYOPIA: ICD-10-CM

## 2019-06-27 DIAGNOSIS — H52.13 MYOPIA OF BOTH EYES WITH ASTIGMATISM AND PRESBYOPIA: ICD-10-CM

## 2019-06-27 PROCEDURE — 92014 COMPRE OPH EXAM EST PT 1/>: CPT | Performed by: OPHTHALMOLOGY

## 2019-06-27 PROCEDURE — 92015 DETERMINE REFRACTIVE STATE: CPT | Performed by: OPHTHALMOLOGY

## 2019-06-27 RX ORDER — CLONAZEPAM 0.5 MG/1
TABLET ORAL
Qty: 30 TABLET | Refills: 5 | Status: SHIPPED | OUTPATIENT
Start: 2019-06-27 | End: 2019-07-11

## 2019-06-27 NOTE — PROGRESS NOTES
Yvonne Rehman is a 48year old female. HPI:     HPI     Pt is here for a complete exam. Pt complains of gradual decrease in DVA over the past year. Pt would like a glasses Rx today for distance and near.  Pt complains of bump on RLL a little over a we 1 Application topically 2 (two) times daily. Disp: 30 g Rfl: 2   Omeprazole 40 MG Oral Capsule Delayed Release TAKE 1 CAPSULE BY MOUTH ONCE DAILY Disp: 30 capsule Rfl: 2   loratadine 10 MG Oral Tab Take 1 tablet (10 mg total) by mouth daily.  Disp: 30 table Refraction #2       [facility] [place] [facility] [address] [facility]    Right -0.50 +0.50 015 20/20- +2.25 20/20    Left -0.50 +0.25 165 20/20 +2.25 20/20          Final Rx       [facility] [place] [address] [facility]    Right -0.50 +0.50 015 20/20-     Left -0.50 +

## 2019-07-08 ENCOUNTER — HOSPITAL ENCOUNTER (OUTPATIENT)
Dept: MAMMOGRAPHY | Age: 54
Discharge: HOME | End: 2019-07-08
Attending: INTERNAL MEDICINE
Payer: MEDICAID

## 2019-07-08 DIAGNOSIS — Z12.39 SCREENING FOR BREAST CANCER: ICD-10-CM

## 2019-07-08 PROCEDURE — 77067 SCR MAMMO BI INCL CAD: CPT | Performed by: INTERNAL MEDICINE

## 2019-07-08 PROCEDURE — 77063 BREAST TOMOSYNTHESIS BI: CPT | Performed by: INTERNAL MEDICINE

## 2019-07-09 ENCOUNTER — TELEPHONE (OUTPATIENT)
Dept: OTHER | Age: 54
End: 2019-07-09

## 2019-07-09 NOTE — PROGRESS NOTES
Patient returning a call, advised Dr Torey Mclean note and verbalized understanding. Transferred call to Trippy Bandz as patient requested.     Notes recorded by Lazaro Miller MD on 7/9/2019 at 12:56 PM CDT  Please inform the patient that there  are s

## 2019-07-11 ENCOUNTER — OFFICE VISIT (OUTPATIENT)
Dept: OTOLARYNGOLOGY | Facility: CLINIC | Age: 54
End: 2019-07-11
Payer: MEDICAID

## 2019-07-11 VITALS
HEIGHT: 62 IN | HEART RATE: 72 BPM | BODY MASS INDEX: 29.11 KG/M2 | DIASTOLIC BLOOD PRESSURE: 72 MMHG | TEMPERATURE: 98 F | RESPIRATION RATE: 18 BRPM | SYSTOLIC BLOOD PRESSURE: 102 MMHG | WEIGHT: 158.19 LBS

## 2019-07-11 DIAGNOSIS — R42 DIZZINESS: Primary | ICD-10-CM

## 2019-07-11 PROCEDURE — 99214 OFFICE O/P EST MOD 30 MIN: CPT | Performed by: OTOLARYNGOLOGY

## 2019-07-11 RX ORDER — CLONAZEPAM 0.5 MG/1
TABLET ORAL
Qty: 30 TABLET | Refills: 5 | Status: SHIPPED | OUTPATIENT
Start: 2019-07-11 | End: 2020-03-05

## 2019-07-11 RX ORDER — LORATADINE 10 MG/1
10 TABLET ORAL DAILY
Qty: 30 TABLET | Refills: 3 | Status: SHIPPED | OUTPATIENT
Start: 2019-07-11 | End: 2019-11-01 | Stop reason: ALTCHOICE

## 2019-07-11 RX ORDER — MONTELUKAST SODIUM 10 MG/1
10 TABLET ORAL NIGHTLY
Qty: 30 TABLET | Refills: 5 | Status: SHIPPED | OUTPATIENT
Start: 2019-07-11 | End: 2021-01-18

## 2019-07-11 RX ORDER — LORATADINE 10 MG/1
10 TABLET ORAL DAILY
Qty: 30 TABLET | Refills: 5 | Status: SHIPPED | OUTPATIENT
Start: 2019-07-11 | End: 2019-12-24

## 2019-07-12 NOTE — PROGRESS NOTES
Pradeep Cole is a 48year old female. Patient presents with:  Medication Request: pt presents for f/u to recieve refills on clonazepam 0.5mg, loratadine 10 mg and Singular. states doing good with medications no complaints at this time.        HISTORY O history of Ménière's.   4/17/18 since I last saw her she has been dealing with a lot of stress anxiety and has increased her use of clonazepam to 0.5 mg p.o. twice daily as needed.   Generally she uses 1 tablet in fact will go days without using a tablet at CVA   • Hypertension Mother    • Diabetes Sister         type 2 - half (P)   • Colon Cancer Neg         close relative   • Glaucoma Neg    • Macular degeneration Neg        Past Medical History:   Diagnosis Date   • Acute pyelonephritis 1998    Hospitali Normal. Fundus - Right: Normal, Left: Normal.   Neurological Normal Memory - Normal. Cranial nerves - Cranial nerves II through XII grossly intact.    Head/Face Normal Facial features - Normal. Eyebrows - Normal. Skull - Normal.        Nasopharynx Normal Ex

## 2019-07-31 ENCOUNTER — HOSPITAL ENCOUNTER (OUTPATIENT)
Dept: ULTRASOUND IMAGING | Facility: HOSPITAL | Age: 54
Discharge: HOME | End: 2019-07-31
Attending: INTERNAL MEDICINE
Payer: MEDICAID

## 2019-07-31 ENCOUNTER — TELEPHONE (OUTPATIENT)
Dept: INTERNAL MEDICINE CLINIC | Facility: CLINIC | Age: 54
End: 2019-07-31

## 2019-07-31 ENCOUNTER — HOSPITAL ENCOUNTER (OUTPATIENT)
Dept: MAMMOGRAPHY | Facility: HOSPITAL | Age: 54
Discharge: HOME | End: 2019-07-31
Attending: INTERNAL MEDICINE
Payer: MEDICAID

## 2019-07-31 ENCOUNTER — OFFICE VISIT (OUTPATIENT)
Dept: OBGYN CLINIC | Facility: CLINIC | Age: 54
End: 2019-07-31
Payer: MEDICAID

## 2019-07-31 ENCOUNTER — LAB ENCOUNTER (OUTPATIENT)
Dept: LAB | Facility: HOSPITAL | Age: 54
End: 2019-07-31
Attending: INTERNAL MEDICINE
Payer: MEDICAID

## 2019-07-31 VITALS
SYSTOLIC BLOOD PRESSURE: 107 MMHG | BODY MASS INDEX: 29 KG/M2 | HEART RATE: 80 BPM | WEIGHT: 156 LBS | DIASTOLIC BLOOD PRESSURE: 70 MMHG

## 2019-07-31 DIAGNOSIS — R92.2 INCONCLUSIVE MAMMOGRAM: ICD-10-CM

## 2019-07-31 DIAGNOSIS — Z00.00 ANNUAL PHYSICAL EXAM: ICD-10-CM

## 2019-07-31 DIAGNOSIS — R92.8 ABNORMAL MAMMOGRAM: ICD-10-CM

## 2019-07-31 DIAGNOSIS — E55.9 VITAMIN D DEFICIENCY: ICD-10-CM

## 2019-07-31 DIAGNOSIS — R10.2 FEMALE PELVIC PAIN: Primary | ICD-10-CM

## 2019-07-31 LAB
25(OH)D3 SERPL-MCNC: 28 NG/ML (ref 30–100)
ALBUMIN SERPL-MCNC: 4.3 G/DL (ref 3.4–5)
ALBUMIN/GLOB SERPL: 1.2 {RATIO} (ref 1–2)
ALP LIVER SERPL-CCNC: 88 U/L (ref 41–108)
ALT SERPL-CCNC: 66 U/L (ref 13–56)
ANION GAP SERPL CALC-SCNC: 6 MMOL/L (ref 0–18)
AST SERPL-CCNC: 34 U/L (ref 15–37)
BASOPHILS # BLD AUTO: 0.04 X10(3) UL (ref 0–0.2)
BASOPHILS NFR BLD AUTO: 0.6 %
BILIRUB SERPL-MCNC: 0.4 MG/DL (ref 0.1–2)
BILIRUB UR QL: NEGATIVE
BUN BLD-MCNC: 14 MG/DL (ref 7–18)
BUN/CREAT SERPL: 18.7 (ref 10–20)
CALCIUM BLD-MCNC: 9.7 MG/DL (ref 8.5–10.1)
CHLORIDE SERPL-SCNC: 109 MMOL/L (ref 98–112)
CHOLEST SMN-MCNC: 212 MG/DL (ref ?–200)
CLARITY UR: CLEAR
CO2 SERPL-SCNC: 27 MMOL/L (ref 21–32)
COLOR UR: YELLOW
CREAT BLD-MCNC: 0.75 MG/DL (ref 0.55–1.02)
DEPRECATED RDW RBC AUTO: 42.1 FL (ref 35.1–46.3)
EOSINOPHIL # BLD AUTO: 0.15 X10(3) UL (ref 0–0.7)
EOSINOPHIL NFR BLD AUTO: 2.2 %
ERYTHROCYTE [DISTWIDTH] IN BLOOD BY AUTOMATED COUNT: 13 % (ref 11–15)
GLOBULIN PLAS-MCNC: 3.7 G/DL (ref 2.8–4.4)
GLUCOSE BLD-MCNC: 87 MG/DL (ref 70–99)
GLUCOSE UR-MCNC: NEGATIVE MG/DL
HCT VFR BLD AUTO: 44.7 % (ref 35–48)
HDLC SERPL-MCNC: 51 MG/DL (ref 40–59)
HGB BLD-MCNC: 14.5 G/DL (ref 12–16)
HGB UR QL STRIP.AUTO: NEGATIVE
IMM GRANULOCYTES # BLD AUTO: 0.03 X10(3) UL (ref 0–1)
IMM GRANULOCYTES NFR BLD: 0.4 %
KETONES UR-MCNC: NEGATIVE MG/DL
LDLC SERPL CALC-MCNC: 137 MG/DL (ref ?–100)
LYMPHOCYTES # BLD AUTO: 2.32 X10(3) UL (ref 1–4)
LYMPHOCYTES NFR BLD AUTO: 34.3 %
M PROTEIN MFR SERPL ELPH: 8 G/DL (ref 6.4–8.2)
MCH RBC QN AUTO: 28.7 PG (ref 26–34)
MCHC RBC AUTO-ENTMCNC: 32.4 G/DL (ref 31–37)
MCV RBC AUTO: 88.5 FL (ref 80–100)
MONOCYTES # BLD AUTO: 0.54 X10(3) UL (ref 0.1–1)
MONOCYTES NFR BLD AUTO: 8 %
NEUTROPHILS # BLD AUTO: 3.68 X10 (3) UL (ref 1.5–7.7)
NEUTROPHILS # BLD AUTO: 3.68 X10(3) UL (ref 1.5–7.7)
NEUTROPHILS NFR BLD AUTO: 54.5 %
NITRITE UR QL STRIP.AUTO: NEGATIVE
NONHDLC SERPL-MCNC: 161 MG/DL (ref ?–130)
OSMOLALITY SERPL CALC.SUM OF ELEC: 294 MOSM/KG (ref 275–295)
PATIENT FASTING: NO
PATIENT FASTING: NO
PH UR: 6 [PH] (ref 5–8)
PLATELET # BLD AUTO: 260 10(3)UL (ref 150–450)
POTASSIUM SERPL-SCNC: 4 MMOL/L (ref 3.5–5.1)
PROT UR-MCNC: NEGATIVE MG/DL
RBC # BLD AUTO: 5.05 X10(6)UL (ref 3.8–5.3)
RBC #/AREA URNS AUTO: <1 /HPF
SODIUM SERPL-SCNC: 142 MMOL/L (ref 136–145)
SP GR UR STRIP: 1 (ref 1–1.03)
TRIGL SERPL-MCNC: 120 MG/DL (ref 30–149)
TSI SER-ACNC: 2.07 MIU/ML (ref 0.36–3.74)
UROBILINOGEN UR STRIP-ACNC: <2
VIT C UR-MCNC: NEGATIVE MG/DL
VLDLC SERPL CALC-MCNC: 24 MG/DL (ref 0–30)
WBC # BLD AUTO: 6.8 X10(3) UL (ref 4–11)
WBC #/AREA URNS AUTO: 1 /HPF

## 2019-07-31 PROCEDURE — 36415 COLL VENOUS BLD VENIPUNCTURE: CPT

## 2019-07-31 PROCEDURE — 77066 DX MAMMO INCL CAD BI: CPT | Performed by: INTERNAL MEDICINE

## 2019-07-31 PROCEDURE — 81001 URINALYSIS AUTO W/SCOPE: CPT

## 2019-07-31 PROCEDURE — 82306 VITAMIN D 25 HYDROXY: CPT

## 2019-07-31 PROCEDURE — 76642 ULTRASOUND BREAST LIMITED: CPT | Performed by: INTERNAL MEDICINE

## 2019-07-31 PROCEDURE — 80053 COMPREHEN METABOLIC PANEL: CPT

## 2019-07-31 PROCEDURE — 99214 OFFICE O/P EST MOD 30 MIN: CPT | Performed by: OBSTETRICS & GYNECOLOGY

## 2019-07-31 PROCEDURE — 77062 BREAST TOMOSYNTHESIS BI: CPT | Performed by: INTERNAL MEDICINE

## 2019-07-31 PROCEDURE — 80061 LIPID PANEL: CPT

## 2019-07-31 PROCEDURE — 85025 COMPLETE CBC W/AUTO DIFF WBC: CPT

## 2019-07-31 PROCEDURE — 84443 ASSAY THYROID STIM HORMONE: CPT

## 2019-07-31 NOTE — TELEPHONE ENCOUNTER
Per [facility]'s protocol, stat imaging studies will need to be handled by Dr Chris clinical team.     Please contact [phone] and secure an authorization the the [place] that was ordered stat by Dr Genaro Hyman.      Thank you, Jason

## 2019-07-31 NOTE — TELEPHONE ENCOUNTER
Patient had a stat US done today ordered by Dr. Emmie Gooden.  stated that a PA is needed. [place] 99[address]  Dx Code R92.8 . Surjit Lovett states that manage care will not assist with this call since it was a stat.

## 2019-07-31 NOTE — TELEPHONE ENCOUNTER
I Did not order stat US breast .   Please look at the order details . It was routine. So Please redirect to appropriate parties.   Thanks

## 2019-08-08 ENCOUNTER — TELEPHONE (OUTPATIENT)
Dept: OTHER | Age: 54
End: 2019-08-08

## 2019-08-08 NOTE — TELEPHONE ENCOUNTER
Pt calling for lab and mammogram results, states she can't access [employer]. Reviewed all test results and recommendations as noted below. Pt agreed with plan of care and had no further questions at this time.      Written by Megan Gerard MD on 7/31/2019

## 2019-08-13 ENCOUNTER — TELEPHONE (OUTPATIENT)
Dept: OBGYN CLINIC | Facility: CLINIC | Age: 54
End: 2019-08-13

## 2019-08-13 DIAGNOSIS — R10.2 PELVIC PAIN: Primary | ICD-10-CM

## 2019-08-14 NOTE — TELEPHONE ENCOUNTER
Pt stated she is still having pelvic pain on and off. Pt informed that order will be placed for pelvic US and she can call CS to set up appt. Pt verbalized understanding and stated she already has # to CS and will set up appt.

## 2019-08-14 NOTE — H&P
HPI:  The patient is a 48 yo sexually active F c/o 4 weeks of daily pain. Pain all day long that comes and goes. Dull ache in sensation and 8/10 on pain scale. Fluctuates between left and right side. Denies any VB. No PCB or abnormal dc.  Dysparuenia n Non-medical: Not on file    Tobacco Use      Smoking status: Current Some Day Smoker        Packs/day: 0.10        Years: 20.00        Pack years: 2        Types: Cigarettes      Smokeless tobacco: Never Used      Tobacco comment: 2-3 cigarettes daily    S Narrative      Not on file      FAMILY HISTORY:  Family History   Problem Relation Age of Onset   • Stroke Father 67        CVA   • Hypertension Father    • Stroke Mother 78        CVA   • Hypertension Mother    • Diabetes Sister         type 2 - half (P) tenderness on motion  Uterus: normal in size, contour, position, mobility, without tenderness  Adnexa: normal without masses or tenderness  Perineum: normal    Assessment/Plan:    Sherrie Baeza was seen today for gyn exam.    Diagnoses and all orders for this visi

## 2019-08-29 ENCOUNTER — OFFICE VISIT (OUTPATIENT)
Dept: OBGYN CLINIC | Facility: CLINIC | Age: 54
End: 2019-08-29
Payer: MEDICAID

## 2019-08-29 VITALS
HEART RATE: 97 BPM | DIASTOLIC BLOOD PRESSURE: 75 MMHG | WEIGHT: 157.81 LBS | SYSTOLIC BLOOD PRESSURE: 110 MMHG | BODY MASS INDEX: 29.04 KG/M2 | HEIGHT: 62 IN

## 2019-08-29 DIAGNOSIS — Z01.419 WELL WOMAN EXAM: Primary | ICD-10-CM

## 2019-08-29 PROCEDURE — 99396 PREV VISIT EST AGE 40-64: CPT | Performed by: OBSTETRICS & GYNECOLOGY

## 2019-08-29 NOTE — H&P
HPI:  The patient is a 46 yo sexually active F here for WWE. Intermittent pelvic pain still since our last visit. Has US scheduled. No PMB. +IC occasionally with . C/o abd wall lesion and nodule in right armpit.       LPS: 4/17/18-pap/hpv neg Substance and Sexual Activity      Alcohol use:  Yes        Alcohol/week: 1.0 standard drinks        Types: 1 Standard drinks or equivalent per week        Comment: socially      Drug use: No      Sexual activity: Never        Birth control/protection: [place] Colon Cancer Neg         close relative   • Glaucoma Neg    • Macular degeneration Neg        MEDICATIONS:    Current Outpatient Medications:   •  loratadine 10 MG Oral Tab, Take 1 tablet (10 mg total) by mouth daily. , Disp: 30 tablet, Rfl: 3  •  clonazePA wall; history of lipo; ?scar tissue  Skin/Hair: no unusual rashes or bruises  Extremities: no edema, no cyanosis  Psychiatric: Appropriate mood and affect    Pelvic Exam:  External Genitalia: normal appearance, hair distribution, and no lesions  Urethral M

## 2019-09-04 ENCOUNTER — TELEPHONE (OUTPATIENT)
Dept: OBGYN CLINIC | Facility: CLINIC | Age: 54
End: 2019-09-04

## 2019-09-04 NOTE — TELEPHONE ENCOUNTER
Received Charline Engle dated 8-30-19. CPT 54135 - [place], [facility] has been approved. Sent letter to scanning.

## 2019-09-09 ENCOUNTER — NURSE TRIAGE (OUTPATIENT)
Dept: INTERNAL MEDICINE CLINIC | Facility: CLINIC | Age: 54
End: 2019-09-09

## 2019-09-09 NOTE — TELEPHONE ENCOUNTER
Action Requested: Summary for Provider     []  Critical Lab, Recommendations Needed  [] Need Additional Advice  []   FYI    []   Need Orders  [] Need Medications Sent to Pharmacy  []  Other     SUMMARY:   Appointment made for tomorrow.   The patient could n

## 2019-09-09 NOTE — TELEPHONE ENCOUNTER
Pt states she is having shortness of breath, per pt was having symptoms past 2 days and has not been better. Started with neck pain. Per pt took anxiety medication and didn't help.

## 2019-09-24 ENCOUNTER — OFFICE VISIT (OUTPATIENT)
Dept: FAMILY MEDICINE CLINIC | Facility: CLINIC | Age: 54
End: 2019-09-24
Payer: MEDICAID

## 2019-09-24 VITALS
WEIGHT: 155 LBS | HEIGHT: 62 IN | HEART RATE: 78 BPM | DIASTOLIC BLOOD PRESSURE: 72 MMHG | TEMPERATURE: 98 F | SYSTOLIC BLOOD PRESSURE: 110 MMHG | BODY MASS INDEX: 28.52 KG/M2

## 2019-09-24 DIAGNOSIS — R00.2 PALPITATIONS: ICD-10-CM

## 2019-09-24 DIAGNOSIS — E55.9 VITAMIN D DEFICIENCY: ICD-10-CM

## 2019-09-24 DIAGNOSIS — D17.1 LIPOMA OF TORSO: Primary | ICD-10-CM

## 2019-09-24 PROCEDURE — 99202 OFFICE O/P NEW SF 15 MIN: CPT | Performed by: FAMILY MEDICINE

## 2019-09-24 RX ORDER — ERGOCALCIFEROL 1.25 MG/1
50000 CAPSULE ORAL WEEKLY
Qty: 12 CAPSULE | Refills: 0 | Status: SHIPPED | OUTPATIENT
Start: 2019-09-24 | End: 2019-12-23

## 2019-09-24 NOTE — PROGRESS NOTES
Blood pressure 110/72, pulse 78, temperature 98.1 °F (36.7 °C), height 5' 2\" (1.575 m), weight 155 lb (70.3 kg), unknown if currently breastfeeding. Presents today complaining of fatigue and multiple lipomas.   She reports that she occasionally snores b

## 2019-11-01 ENCOUNTER — OFFICE VISIT (OUTPATIENT)
Dept: INTERNAL MEDICINE CLINIC | Facility: CLINIC | Age: 54
End: 2019-11-01
Payer: MEDICAID

## 2019-11-01 VITALS
WEIGHT: 152 LBS | HEIGHT: 62 IN | SYSTOLIC BLOOD PRESSURE: 104 MMHG | BODY MASS INDEX: 27.97 KG/M2 | TEMPERATURE: 98 F | DIASTOLIC BLOOD PRESSURE: 70 MMHG | HEART RATE: 77 BPM | RESPIRATION RATE: 18 BRPM

## 2019-11-01 DIAGNOSIS — R22.9 SUBCUTANEOUS NODULES: ICD-10-CM

## 2019-11-01 DIAGNOSIS — F32.9 REACTIVE DEPRESSION: ICD-10-CM

## 2019-11-01 DIAGNOSIS — K21.9 GASTROESOPHAGEAL REFLUX DISEASE, ESOPHAGITIS PRESENCE NOT SPECIFIED: ICD-10-CM

## 2019-11-01 DIAGNOSIS — R10.11 RUQ PAIN: ICD-10-CM

## 2019-11-01 DIAGNOSIS — K76.0 FATTY LIVER: ICD-10-CM

## 2019-11-01 DIAGNOSIS — D17.9 LIPOMA, UNSPECIFIED SITE: Primary | ICD-10-CM

## 2019-11-01 DIAGNOSIS — R74.8 ELEVATED LIVER ENZYMES: ICD-10-CM

## 2019-11-01 PROCEDURE — 99214 OFFICE O/P EST MOD 30 MIN: CPT | Performed by: INTERNAL MEDICINE

## 2019-11-01 RX ORDER — OMEPRAZOLE 40 MG/1
CAPSULE, DELAYED RELEASE ORAL
Qty: 30 CAPSULE | Refills: 2 | Status: SHIPPED | OUTPATIENT
Start: 2019-11-01 | End: 2020-02-12

## 2019-11-01 NOTE — PROGRESS NOTES
Tuan Richmond is a 47year old female.   Patient presents with:  Establish Care  Fatigue  Weight Loss  Lump: on stomach, leg, arm      HPI:   She comes as a a new patient  C/c she comes to establish care and has mult complaints   C/o ran out of her meds Past Medical History:   Diagnosis Date   • Acute pyelonephritis 1998    Hospitalized 2 weeks   • Anesthesia complication     pt reports low BP   • Meniere disease    • SVT (supraventricular tachycardia) ([address] 1995    Status post ablation   • Vestibular d supple,no adenopathy,  LUNGS: clear to auscultation, no wheeze  CARDIO: RRR without murmur  GI: good BS's,no masses + mild  Tenderness RUQ , no guarding or rigidity , well healed scan lower abd   EXTREMITIES: no  edema    ASSESSMENT AND PLAN:   Diagnoses a

## 2019-11-22 ENCOUNTER — OFFICE VISIT (OUTPATIENT)
Dept: INTERNAL MEDICINE CLINIC | Facility: CLINIC | Age: 54
End: 2019-11-22
Payer: MEDICAID

## 2019-11-22 ENCOUNTER — NURSE TRIAGE (OUTPATIENT)
Dept: INTERNAL MEDICINE CLINIC | Facility: CLINIC | Age: 54
End: 2019-11-22

## 2019-11-22 VITALS
TEMPERATURE: 98 F | BODY MASS INDEX: 27.79 KG/M2 | SYSTOLIC BLOOD PRESSURE: 98 MMHG | DIASTOLIC BLOOD PRESSURE: 66 MMHG | HEART RATE: 75 BPM | HEIGHT: 62 IN | WEIGHT: 151 LBS

## 2019-11-22 DIAGNOSIS — H00.015 HORDEOLUM EXTERNUM OF LEFT LOWER EYELID: Primary | ICD-10-CM

## 2019-11-22 PROCEDURE — 99213 OFFICE O/P EST LOW 20 MIN: CPT | Performed by: INTERNAL MEDICINE

## 2019-11-22 NOTE — PROGRESS NOTES
Patient ID: Penny Romo is a 47year old female. Patient presents with: Eye Visual Problem (opthalmic): Stye on left eye for 3 weeks, per patient size has been growing. And is also having eye irritation on right eye x1 day.         HISTORY OF PRESEN •  ergocalciferol 89816 units Oral Cap, Take 1 capsule (50,000 Units total) by mouth once a week.  For the next 3 months., Disp: 12 capsule, Rfl: 0  •  clonazePAM 0.5 MG Oral Tab, 0.5 mg po daily, Disp: 30 tablet, Rfl: 5  •  Montelukast Sodium 10 MG Oral Ta Attends meetings of clubs or organizations: Not on file        Relationship status: Not on file      Intimate partner violence:        Fear of current or ex partner: Not on file        Emotionally abused: Not on file        Physically abused: Not on 11/22/2019

## 2019-11-22 NOTE — PATIENT INSTRUCTIONS
Sty (or Stye)  A sty is an infection of the oil gland of the eyelid. It may develop into a small pocket of pus (an abscess). This can cause pain, redness, and swelling.  In early stages, a sty is treated with antibiotic cream, eye drops, or a small towel © 4459-6838 [employer] [address]. [address] [facility], [facility] [place]. All rights reserved. This information is not intended as a substitute for professional medical care. Always follow your healthcare professional's instructions.

## 2019-11-22 NOTE — TELEPHONE ENCOUNTER
Action Requested: Summary for Provider     []  Critical Lab, Recommendations Needed  [] Need Additional Advice  []   FYI    []   Need Orders  [] Need Medications Sent to Pharmacy  []  Other     SUMMARY: Appt scheduled for today 1:45pm with Dr Yajaira Beaver for sym

## 2019-11-23 ENCOUNTER — TELEPHONE (OUTPATIENT)
Dept: OTOLARYNGOLOGY | Facility: CLINIC | Age: 54
End: 2019-11-23

## 2019-11-23 NOTE — TELEPHONE ENCOUNTER
A message sent from Dr Zach Valero over at [facility]. \" I'm an internist as was wondering if I can get a patient in sooner to see Dr. Tamara Bonilla. She has a sizeable stye that is causing her corneal irritation.  \"  Please call patient

## 2019-11-26 ENCOUNTER — TELEPHONE (OUTPATIENT)
Dept: OPHTHALMOLOGY | Facility: CLINIC | Age: 54
End: 2019-11-26

## 2019-11-26 NOTE — TELEPHONE ENCOUNTER
Pt called to cancel same day appointment 11-26-19 with Dr. Gustavo Christian stating pt spoke to the nurse yesterday, did what was advised and eye is better today.

## 2019-12-12 ENCOUNTER — TELEPHONE (OUTPATIENT)
Dept: OBGYN CLINIC | Facility: CLINIC | Age: 54
End: 2019-12-12

## 2019-12-12 ENCOUNTER — HOSPITAL ENCOUNTER (OUTPATIENT)
Dept: ULTRASOUND IMAGING | Age: 54
Discharge: HOME | End: 2019-12-12
Attending: INTERNAL MEDICINE
Payer: MEDICAID

## 2019-12-12 ENCOUNTER — HOSPITAL ENCOUNTER (OUTPATIENT)
Dept: ULTRASOUND IMAGING | Age: 54
Discharge: HOME | End: 2019-12-12
Attending: OBSTETRICS & GYNECOLOGY
Payer: MEDICAID

## 2019-12-12 DIAGNOSIS — K76.0 FATTY LIVER: ICD-10-CM

## 2019-12-12 DIAGNOSIS — D17.9 LIPOMA, UNSPECIFIED SITE: ICD-10-CM

## 2019-12-12 DIAGNOSIS — R10.11 RUQ PAIN: ICD-10-CM

## 2019-12-12 DIAGNOSIS — R10.2 PELVIC PAIN: ICD-10-CM

## 2019-12-12 PROCEDURE — 76705 ECHO EXAM OF ABDOMEN: CPT | Performed by: INTERNAL MEDICINE

## 2019-12-12 NOTE — TELEPHONE ENCOUNTER
Martha Lomeli from [facility] Verification is requesting pre-certification for pelvis u/s pt had done this morning.  CPT codes 65595, 04936

## 2019-12-24 ENCOUNTER — OFFICE VISIT (OUTPATIENT)
Dept: INTERNAL MEDICINE CLINIC | Facility: CLINIC | Age: 54
End: 2019-12-24
Payer: MEDICAID

## 2019-12-24 VITALS
TEMPERATURE: 98 F | SYSTOLIC BLOOD PRESSURE: 121 MMHG | HEART RATE: 84 BPM | BODY MASS INDEX: 27.11 KG/M2 | HEIGHT: 63 IN | DIASTOLIC BLOOD PRESSURE: 79 MMHG | WEIGHT: 153 LBS

## 2019-12-24 DIAGNOSIS — R21 RASH: Primary | ICD-10-CM

## 2019-12-24 DIAGNOSIS — K21.9 GASTROESOPHAGEAL REFLUX DISEASE, ESOPHAGITIS PRESENCE NOT SPECIFIED: ICD-10-CM

## 2019-12-24 PROCEDURE — 99213 OFFICE O/P EST LOW 20 MIN: CPT | Performed by: INTERNAL MEDICINE

## 2019-12-24 RX ORDER — FAMOTIDINE 20 MG/1
20 TABLET ORAL 2 TIMES DAILY
Qty: 30 TABLET | Refills: 0 | Status: SHIPPED | OUTPATIENT
Start: 2019-12-24 | End: 2020-02-12 | Stop reason: ALTCHOICE

## 2019-12-24 RX ORDER — LORATADINE 10 MG/1
10 TABLET ORAL DAILY
Qty: 30 TABLET | Refills: 5 | Status: SHIPPED | OUTPATIENT
Start: 2019-12-24 | End: 2020-02-12 | Stop reason: ALTCHOICE

## 2019-12-24 RX ORDER — OMEPRAZOLE 40 MG/1
CAPSULE, DELAYED RELEASE ORAL
Qty: 30 CAPSULE | Refills: 2 | Status: CANCELLED | OUTPATIENT
Start: 2019-12-24

## 2019-12-24 RX ORDER — DIPHENHYDRAMINE HCL 25 MG
25 TABLET ORAL EVERY 8 HOURS PRN
Qty: 30 TABLET | Refills: 0 | Status: SHIPPED | OUTPATIENT
Start: 2019-12-24 | End: 2020-02-12 | Stop reason: ALTCHOICE

## 2019-12-24 NOTE — PATIENT INSTRUCTIONS
How Acid Reflux Affects Your Throat    Do you have to clear your throat or cough often? Are you hoarse? Do you have trouble swallowing? If you have these or other throat symptoms, you may have acid reflux.  This occurs when stomach acid flows back up and

## 2019-12-24 NOTE — PROGRESS NOTES
Bill Loving is a 47year old female.   Patient presents with:  Rash: rash / swelling on cheek area - dryness  Test Results: discuss u/s abdomen       HPI:   Pt comes with friend shannon Quezada   C/c rash and itching on face   C/o above sympt x one week - mg total) by mouth 2 (two) times daily.  30 tablet 0   • Omeprazole 40 MG Oral Capsule Delayed Release TAKE 1 CAPSULE BY MOUTH ONCE DAILY 30 capsule 2   • clonazePAM 0.5 MG Oral Tab 0.5 mg po daily 30 tablet 5   • Montelukast Sodium 10 MG Oral Tab Take 1 ta (36.7 °C) (Oral)   Ht 5' 3\" (1.6 m)   Wt 153 lb (69.4 kg)   BMI 27.10 kg/m²   GENERAL: well developed, well nourished,in no apparent distress  SKIN: no rashes,no suspicious lesions seen except for some dryness on the cheeks , no redness   HEENT: atraumati

## 2020-02-05 DIAGNOSIS — E55.9 VITAMIN D DEFICIENCY: ICD-10-CM

## 2020-02-06 RX ORDER — ERGOCALCIFEROL 1.25 MG/1
CAPSULE ORAL
Qty: 12 CAPSULE | Refills: 1 | OUTPATIENT
Start: 2020-02-06

## 2020-02-06 NOTE — TELEPHONE ENCOUNTER
Pt calling for the follow ing script to be refilled please pharmacy sent over as well       clonazePAM 0.5 MG Oral Tab 30 tablet 5 2019    Si.5 mg po daily     Route:   (none)     Class:   Print Script     Order #:   [id number]

## 2020-02-06 NOTE — TELEPHONE ENCOUNTER
Patient last visit on 7/11/19 dizziness request for refill,last note pt is due for follow up,please advise.

## 2020-02-06 NOTE — TELEPHONE ENCOUNTER
Should be rechecked prior to giving the high-dose  Currently she can take 1000 international units of vitamin D3 over-the-counter

## 2020-02-12 ENCOUNTER — OFFICE VISIT (OUTPATIENT)
Dept: INTERNAL MEDICINE CLINIC | Facility: CLINIC | Age: 55
End: 2020-02-12
Payer: MEDICAID

## 2020-02-12 VITALS
HEART RATE: 96 BPM | WEIGHT: 152 LBS | SYSTOLIC BLOOD PRESSURE: 96 MMHG | DIASTOLIC BLOOD PRESSURE: 69 MMHG | HEIGHT: 63 IN | BODY MASS INDEX: 26.93 KG/M2

## 2020-02-12 DIAGNOSIS — F41.9 ANXIETY: ICD-10-CM

## 2020-02-12 DIAGNOSIS — R19.7 DIARRHEA, UNSPECIFIED TYPE: ICD-10-CM

## 2020-02-12 DIAGNOSIS — R42 DIZZINESS: ICD-10-CM

## 2020-02-12 DIAGNOSIS — Z12.11 COLON CANCER SCREENING: ICD-10-CM

## 2020-02-12 DIAGNOSIS — K21.00 GASTROESOPHAGEAL REFLUX DISEASE WITH ESOPHAGITIS: Primary | ICD-10-CM

## 2020-02-12 PROCEDURE — 99214 OFFICE O/P EST MOD 30 MIN: CPT | Performed by: INTERNAL MEDICINE

## 2020-02-12 RX ORDER — OMEPRAZOLE 40 MG/1
CAPSULE, DELAYED RELEASE ORAL
Qty: 30 CAPSULE | Refills: 2 | Status: SHIPPED | OUTPATIENT
Start: 2020-02-12 | End: 2020-08-08

## 2020-02-13 NOTE — PROGRESS NOTES
Kaleigh Pack is a 47year old female.   Patient presents with:  Medication Request: vit d   Diarrhea: started Monday       HPI:   Pt comes for f/u  C/c diarrhea and dizzy   C/o \"I just dont feel good \"   Diarrhea x 3 days 2 weeks ago and then this wee Outpatient Medications   Medication Sig Dispense Refill   • Omeprazole 40 MG Oral Capsule Delayed Release TAKE 1 CAPSULE BY MOUTH ONCE DAILY 30 capsule 2   • clonazePAM 0.5 MG Oral Tab 0.5 mg po daily 30 tablet 5   • Montelukast Sodium 10 MG Oral Tab Take depression    EXAM:   BP 96/69 (BP Location: Left arm, Patient Position: Sitting, Cuff Size: adult)   Pulse 96   Ht 5' 3\" (1.6 m)   Wt 152 lb (68.9 kg)   BMI 26.93 kg/m²   GENERAL: well developed, well nourished,in no apparent distress  SKIN: no rashes,no

## 2020-02-17 RX ORDER — CLONAZEPAM 0.5 MG/1
0.5 TABLET ORAL DAILY
Qty: 30 TABLET | Refills: 0 | OUTPATIENT
Start: 2020-02-17

## 2020-03-05 ENCOUNTER — APPOINTMENT (OUTPATIENT)
Dept: LAB | Age: 55
End: 2020-03-05
Attending: INTERNAL MEDICINE
Payer: MEDICAID

## 2020-03-05 ENCOUNTER — OFFICE VISIT (OUTPATIENT)
Dept: INTERNAL MEDICINE CLINIC | Facility: CLINIC | Age: 55
End: 2020-03-05
Payer: MEDICAID

## 2020-03-05 ENCOUNTER — TELEPHONE (OUTPATIENT)
Dept: OTHER | Age: 55
End: 2020-03-05

## 2020-03-05 VITALS
HEART RATE: 79 BPM | BODY MASS INDEX: 27 KG/M2 | SYSTOLIC BLOOD PRESSURE: 108 MMHG | DIASTOLIC BLOOD PRESSURE: 73 MMHG | TEMPERATURE: 97 F | WEIGHT: 153 LBS

## 2020-03-05 DIAGNOSIS — R74.01 ELEVATED ALT MEASUREMENT: Primary | ICD-10-CM

## 2020-03-05 DIAGNOSIS — R42 DIZZINESS: ICD-10-CM

## 2020-03-05 DIAGNOSIS — L50.9 HIVES: Primary | ICD-10-CM

## 2020-03-05 DIAGNOSIS — R19.7 DIARRHEA, UNSPECIFIED TYPE: ICD-10-CM

## 2020-03-05 LAB
ALBUMIN SERPL-MCNC: 4.1 G/DL (ref 3.4–5)
ALBUMIN/GLOB SERPL: 1 {RATIO} (ref 1–2)
ALP LIVER SERPL-CCNC: 84 U/L (ref 41–108)
ALT SERPL-CCNC: 74 U/L (ref 13–56)
ANION GAP SERPL CALC-SCNC: 6 MMOL/L (ref 0–18)
AST SERPL-CCNC: 33 U/L (ref 15–37)
BILIRUB SERPL-MCNC: 0.3 MG/DL (ref 0.1–2)
BUN BLD-MCNC: 12 MG/DL (ref 7–18)
BUN/CREAT SERPL: 18.8 (ref 10–20)
CALCIUM BLD-MCNC: 9.8 MG/DL (ref 8.5–10.1)
CHLORIDE SERPL-SCNC: 108 MMOL/L (ref 98–112)
CO2 SERPL-SCNC: 25 MMOL/L (ref 21–32)
CREAT BLD-MCNC: 0.64 MG/DL (ref 0.55–1.02)
GLOBULIN PLAS-MCNC: 4 G/DL (ref 2.8–4.4)
GLUCOSE BLD-MCNC: 84 MG/DL (ref 70–99)
M PROTEIN MFR SERPL ELPH: 8.1 G/DL (ref 6.4–8.2)
OSMOLALITY SERPL CALC.SUM OF ELEC: 287 MOSM/KG (ref 275–295)
PATIENT FASTING Y/N/NP: YES
POTASSIUM SERPL-SCNC: 4.1 MMOL/L (ref 3.5–5.1)
SODIUM SERPL-SCNC: 139 MMOL/L (ref 136–145)

## 2020-03-05 PROCEDURE — 80053 COMPREHEN METABOLIC PANEL: CPT

## 2020-03-05 PROCEDURE — 36415 COLL VENOUS BLD VENIPUNCTURE: CPT

## 2020-03-05 PROCEDURE — 93010 ELECTROCARDIOGRAM REPORT: CPT | Performed by: INTERNAL MEDICINE

## 2020-03-05 PROCEDURE — 93005 ELECTROCARDIOGRAM TRACING: CPT

## 2020-03-05 PROCEDURE — 99214 OFFICE O/P EST MOD 30 MIN: CPT | Performed by: INTERNAL MEDICINE

## 2020-03-05 RX ORDER — METHYLPREDNISOLONE 4 MG/1
TABLET ORAL
Qty: 1 KIT | Refills: 0 | Status: SHIPPED | OUTPATIENT
Start: 2020-03-05 | End: 2020-08-08

## 2020-03-05 RX ORDER — TRIAMCINOLONE ACETONIDE 0.25 MG/G
CREAM TOPICAL
Qty: 80 G | Refills: 0 | Status: SHIPPED | OUTPATIENT
Start: 2020-03-05 | End: 2021-06-14 | Stop reason: ALTCHOICE

## 2020-03-05 RX ORDER — CLONAZEPAM 0.5 MG/1
TABLET ORAL
Qty: 30 TABLET | Refills: 0 | Status: SHIPPED | OUTPATIENT
Start: 2020-03-05 | End: 2020-04-03

## 2020-03-05 NOTE — PATIENT INSTRUCTIONS
Understanding Hives (Urticaria)    Urticaria (hives) are red, itchy, and swollen areas on the skin. They are most often an allergic reaction from eating a food or taking a medicine. Sometimes the cause may be unknown.  A single hive can vary in size from When to call your healthcare provider  Call your healthcare provider if:  · Your hives feel uncomfortable  · You have never had hives before  · Your symptoms don't go away or come back  · Your symptoms get worse or new symptoms develop such as:   ? [facility] This medicine is for external use only. Do not take by mouth. Follow the directions on the prescription label. Wash your hands before and after use. Apply a thin film of medicine to the affected area.  Do not cover with a bandage or dressing unless your doc What should I tell my health care provider before I take this medicine?   They need to know if you have any of these conditions:  · diabetes  · infection, like tuberculosis, herpes, or fungal infection  · large areas of burned or damaged skin  · skin wastin

## 2020-03-05 NOTE — PROGRESS NOTES
History of Present Illness   Patient ID: Julio Montano is a 47year old female. Chief Complaint: Rash (face rash)      Rash   This is a new problem. The current episode started yesterday. The problem has been waxing and waning since onset.  The affected BUN 14 07/31/2019    BUNCREA 18.7 07/31/2019    CREATSERUM 0.75 07/31/2019    ANIONGAP 6 07/31/2019    GFRNAA 91 07/31/2019    GFRAA 105 07/31/2019    CA 9.7 07/31/2019    OSMOCALC 294 07/31/2019    ALKPHO 88 07/31/2019    AST 34 07/31/2019    ALT 66 (H) Per [address] [facility] [address]      Bilateral presbyopia      Glaucoma suspect of both eyes      Anxiety disorder       Reviewed Past Medical History:  Past Medical History:   Diagnosis Date   • Acute pyelonephritis 1998    Hospitalized 2 weeks   • Anesthesia compl Assessment & Plan    1. Hives  - ALLERGY - INTERNAL  - methylPREDNISolone (MEDROL) 4 MG Oral Tablet Therapy Pack; As directed. Dispense: 1 kit; Refill: 0  - triamcinolone acetonide 0.025 % External Cream; DO NOT PUT NEAR EYES OR EYELID.  Apply 1-2 · Being exposed to sunlight or light from a light bulb, in rare cases  · Inhaled-chemicals in the environment from foods and drugs, insects, plants, or other sources  In some cases, hives may occur again and again with no specific cause.   If you have hives © 9922-9459 [employer] [address]. [address] [facility] [place], [address] [place]. All rights reserved. This information is not intended as a substitute for professional medical care. Always follow your healthcare professional's instructions.         Srikanth Hardy Side effects that usually do not require medical attention (report to your doctor or health care professional if they continue or are bothersome):  · dry skin, irritation  · unusual increased growth of hair on the face or body  What may interact with this If treating the diaper area of a child, avoid covering the treated area with tight-fitting diapers or plastic pants. This may increase the amount of medicine that passes through the skin and increase the risk of serious side effects.   NOTE:This sheet is a

## 2020-03-05 NOTE — TELEPHONE ENCOUNTER
The patient would like to be seen. Appointment made for today. She stated she can make to the clinic by 8:40 today   Appointment made. The patient stated was seen on 12/24/19 fore a rash on her face.   It occurred in December and end of January which w

## 2020-03-12 ENCOUNTER — TELEPHONE (OUTPATIENT)
Dept: OTHER | Age: 55
End: 2020-03-12

## 2020-03-12 DIAGNOSIS — E55.9 VITAMIN D DEFICIENCY: Primary | ICD-10-CM

## 2020-03-12 NOTE — TELEPHONE ENCOUNTER
Patient Result Comments     Written by Pradeep Blas MD on 3/5/2020  6:03 PM   Dear Lisa Leahy,     Your test results have been reviewed and overall are within normal limits except that 1 of the liver enzymes remains elevated.  Although this is most

## 2020-03-12 NOTE — TELEPHONE ENCOUNTER
Patient called (Name and  of pt verified). All results and recommendations reviewed. Patient verbalizes understanding, denies further questions and agrees with plan of care. Patient requesting that she receive a call regarding all her results.    Per pa

## 2020-03-13 NOTE — TELEPHONE ENCOUNTER
Spoke with patient ( verified) and relayed Dr. Bernida Nageotte message below--patient verbalizes understanding and agreement. No further questions/concerns at this time.

## 2020-03-14 ENCOUNTER — TELEPHONE (OUTPATIENT)
Dept: INTERNAL MEDICINE CLINIC | Facility: CLINIC | Age: 55
End: 2020-03-14

## 2020-03-14 NOTE — TELEPHONE ENCOUNTER
Contacted pt, call disconnected after pt answered phone. Tried calling back, left detailed message on pt identified voicemail.

## 2020-03-14 NOTE — TELEPHONE ENCOUNTER
Verified pt name and . Pt reviewed doctor's recommendations as noted below. Pt denies fever, shortness of breath. Has not been in contact with anyone diagnosed with Covid-19 or had symptoms of Covid-19.  Denies international travel or being in contact

## 2020-03-14 NOTE — TELEPHONE ENCOUNTER
Agree with advise of rest and hydration , cont claritin   And can try delsym for her cough   Warm salt water gargle and steam inhalation

## 2020-03-14 NOTE — TELEPHONE ENCOUNTER
Most likely viral– can try Claritin 10 mg once a day, rest, hydration, frequent handwashing's, social isolation advised

## 2020-03-14 NOTE — TELEPHONE ENCOUNTER
Patient has congestion in her chest and a cough. Stats she hasn't been around no one sick.        Patient hasn't been out of the country

## 2020-03-16 ENCOUNTER — TELEPHONE (OUTPATIENT)
Dept: OTHER | Age: 55
End: 2020-03-16

## 2020-03-16 RX ORDER — BENZONATATE 100 MG/1
100 CAPSULE ORAL 2 TIMES DAILY PRN
Qty: 10 CAPSULE | Refills: 0 | Status: SHIPPED | OUTPATIENT
Start: 2020-03-16 | End: 2020-03-21

## 2020-03-16 NOTE — TELEPHONE ENCOUNTER
Patient calling back, she does not has a fever    Needs something for her cough , she cannot afford OTC meds      Asking for an RX for the cough as that would be covered by insurance     Allergies reviewed and pharmacy confirmed    Please advise and thank

## 2020-03-16 NOTE — TELEPHONE ENCOUNTER
Spoke with patient (name and  verified) . Patient verbalized understanding and agrees with plan of care.

## 2020-03-21 ENCOUNTER — NURSE TRIAGE (OUTPATIENT)
Dept: INTERNAL MEDICINE CLINIC | Facility: CLINIC | Age: 55
End: 2020-03-21

## 2020-03-21 DIAGNOSIS — R05.9 COUGH: Primary | ICD-10-CM

## 2020-03-21 PROCEDURE — 99441 PHONE E/M BY PHYS 5-10 MIN: CPT | Performed by: INTERNAL MEDICINE

## 2020-03-21 RX ORDER — BENZONATATE 100 MG/1
100 CAPSULE ORAL 2 TIMES DAILY PRN
Qty: 30 CAPSULE | Refills: 0 | Status: SHIPPED | OUTPATIENT
Start: 2020-03-21 | End: 2020-08-08

## 2020-03-21 RX ORDER — GUAIFENESIN AND CODEINE PHOSPHATE 100; 10 MG/5ML; MG/5ML
5 SOLUTION ORAL NIGHTLY PRN
Qty: 118 ML | Refills: 0 | Status: SHIPPED | OUTPATIENT
Start: 2020-03-21 | End: 2020-04-04

## 2020-03-21 NOTE — TELEPHONE ENCOUNTER
Action Requested: Summary for Provider     []  Critical Lab, Recommendations Needed  [] Need Additional Advice  []   FYI    []   Need Orders  [] Need Medications Sent to Pharmacy  []  Other     SUMMARY: Dr. Katerina Peck: please advise if testing needed; patient

## 2020-03-21 NOTE — TELEPHONE ENCOUNTER
Telemedicine Note    Virtual/Telephone Check-In    Paula Noyola verbally consents to a Virtual/Telephone Check-In service on 03/21/20.  Patient understands and accepts financial responsibility for any deductible, co-insurance and/or co-pays associated w syrup  Patient declined inhaler  Call back if not better or if she develops fevers--  Monitor for fevers  Advised social distancing/isolation and not to have the granddaughter come by    Diagnosis:  There are no diagnoses linked to this encounter.   Laurie

## 2020-03-24 ENCOUNTER — HOSPITAL ENCOUNTER (OUTPATIENT)
Dept: GENERAL RADIOLOGY | Age: 55
Discharge: HOME | End: 2020-03-24
Attending: INTERNAL MEDICINE
Payer: MEDICAID

## 2020-03-24 ENCOUNTER — TELEPHONE (OUTPATIENT)
Dept: INTERNAL MEDICINE CLINIC | Facility: CLINIC | Age: 55
End: 2020-03-24

## 2020-03-24 DIAGNOSIS — R05.9 COUGH: ICD-10-CM

## 2020-03-24 PROCEDURE — 71046 X-RAY EXAM CHEST 2 VIEWS: CPT | Performed by: INTERNAL MEDICINE

## 2020-03-24 RX ORDER — AZITHROMYCIN 250 MG/1
TABLET, FILM COATED ORAL
Qty: 6 TABLET | Refills: 0 | Status: SHIPPED | OUTPATIENT
Start: 2020-03-24 | End: 2020-08-08

## 2020-03-24 NOTE — TELEPHONE ENCOUNTER
Spoke with patient ( verified)--\"I've been sick for more than 2 weeks and Dr. Edmond Valle hasn't given me any antibiotics. I have a Z merritt at home, but it's , from 2019. I still have a little bit of a sore throat, runny nose, nausea and a fever.  Last

## 2020-03-24 NOTE — TELEPHONE ENCOUNTER
Called and spoke to pt   Pt states she would like to be tested for the coronavirus -- noted no chnge in history except for fevers now   Sent abx to pharm on file

## 2020-03-26 ENCOUNTER — APPOINTMENT (OUTPATIENT)
Dept: LAB | Age: 55
End: 2020-03-26
Attending: INTERNAL MEDICINE
Payer: MEDICAID

## 2020-03-26 DIAGNOSIS — E55.9 VITAMIN D DEFICIENCY: ICD-10-CM

## 2020-03-26 DIAGNOSIS — R74.01 ELEVATED ALT MEASUREMENT: ICD-10-CM

## 2020-03-26 LAB
HBV CORE IGM SER QL: NONREACTIVE
HBV SURFACE AB SER QL: NONREACTIVE
HBV SURFACE AB SERPL IA-ACNC: <3.1 MIU/ML
HBV SURFACE AG SER-ACNC: <0.1 [IU]/L
HBV SURFACE AG SERPL QL IA: NONREACTIVE
HCV AB SERPL QL IA: NONREACTIVE

## 2020-03-26 PROCEDURE — 86706 HEP B SURFACE ANTIBODY: CPT

## 2020-03-26 PROCEDURE — 82306 VITAMIN D 25 HYDROXY: CPT

## 2020-03-26 PROCEDURE — 87340 HEPATITIS B SURFACE AG IA: CPT

## 2020-03-26 PROCEDURE — 86803 HEPATITIS C AB TEST: CPT

## 2020-03-26 PROCEDURE — 36415 COLL VENOUS BLD VENIPUNCTURE: CPT

## 2020-03-26 PROCEDURE — 86705 HEP B CORE ANTIBODY IGM: CPT

## 2020-03-27 ENCOUNTER — TELEPHONE (OUTPATIENT)
Dept: INTERNAL MEDICINE CLINIC | Facility: CLINIC | Age: 55
End: 2020-03-27

## 2020-03-27 LAB — 25(OH)D3 SERPL-MCNC: 23.5 NG/ML (ref 30–100)

## 2020-03-27 RX ORDER — ERGOCALCIFEROL 1.25 MG/1
50000 CAPSULE ORAL WEEKLY
Qty: 4 CAPSULE | Refills: 1 | Status: SHIPPED | OUTPATIENT
Start: 2020-03-27 | End: 2020-08-08 | Stop reason: ALTCHOICE

## 2020-03-27 NOTE — TELEPHONE ENCOUNTER
Patient advised of result notes from labs completed 3/26 and EKG, verbalized understanding.  Reports has been taking Vit D 1000 units daily, in the past had to take Vit D weekly dose, when that finished her Vit D still remained low while taking otc Vit D, p

## 2020-03-28 NOTE — TELEPHONE ENCOUNTER
My chart msg sent but pls call pt to let her know that once a week vit d was sent to pharm on file for 8 weeks --

## 2020-04-03 RX ORDER — CLONAZEPAM 0.5 MG/1
TABLET ORAL
Qty: 30 TABLET | Refills: 0 | Status: SHIPPED | OUTPATIENT
Start: 2020-04-03 | End: 2020-06-13

## 2020-05-28 RX ORDER — CLONAZEPAM 0.5 MG/1
TABLET ORAL
Qty: 30 TABLET | Refills: 2 | OUTPATIENT
Start: 2020-05-28

## 2020-06-11 ENCOUNTER — TELEPHONE (OUTPATIENT)
Dept: OTOLARYNGOLOGY | Facility: CLINIC | Age: 55
End: 2020-06-11

## 2020-06-13 RX ORDER — CLONAZEPAM 0.5 MG/1
TABLET ORAL
Qty: 30 TABLET | Refills: 0 | Status: SHIPPED
Start: 2020-06-13 | End: 2020-06-23

## 2020-06-16 NOTE — TELEPHONE ENCOUNTER
Pt informed rx sent for refill, 06/13, pt advised to schedule appointment before next refill is needed. Pt will call back to schedule.

## 2020-06-16 NOTE — TELEPHONE ENCOUNTER
Pt asking for appt today with HARIKA  - his first appt is Friday - offered for Dr Burt Shannon tomorrow - she states she is out of med - asking if she can get refill until appt

## 2020-06-23 RX ORDER — CLONAZEPAM 0.5 MG/1
TABLET ORAL
Qty: 30 TABLET | Refills: 0 | Status: SHIPPED | OUTPATIENT
Start: 2020-06-23 | End: 2020-08-06

## 2020-08-06 ENCOUNTER — OFFICE VISIT (OUTPATIENT)
Dept: OTOLARYNGOLOGY | Facility: CLINIC | Age: 55
End: 2020-08-06
Payer: MEDICAID

## 2020-08-06 VITALS
BODY MASS INDEX: 26.58 KG/M2 | TEMPERATURE: 98 F | SYSTOLIC BLOOD PRESSURE: 101 MMHG | DIASTOLIC BLOOD PRESSURE: 66 MMHG | WEIGHT: 150 LBS | HEIGHT: 63 IN

## 2020-08-06 DIAGNOSIS — R42 DIZZINESS: Primary | ICD-10-CM

## 2020-08-06 PROCEDURE — 3008F BODY MASS INDEX DOCD: CPT | Performed by: OTOLARYNGOLOGY

## 2020-08-06 PROCEDURE — 3078F DIAST BP <80 MM HG: CPT | Performed by: OTOLARYNGOLOGY

## 2020-08-06 PROCEDURE — 99213 OFFICE O/P EST LOW 20 MIN: CPT | Performed by: OTOLARYNGOLOGY

## 2020-08-06 PROCEDURE — 3074F SYST BP LT 130 MM HG: CPT | Performed by: OTOLARYNGOLOGY

## 2020-08-06 RX ORDER — CLONAZEPAM 0.5 MG/1
0.5 TABLET ORAL DAILY
Qty: 30 TABLET | Refills: 2 | Status: SHIPPED | OUTPATIENT
Start: 2020-08-06 | End: 2020-08-08

## 2020-08-07 RX ORDER — CLONAZEPAM 0.5 MG/1
TABLET ORAL
Qty: 30 TABLET | Refills: 0 | OUTPATIENT
Start: 2020-08-07

## 2020-08-07 NOTE — PROGRESS NOTES
Marychuy Peoples is a 54year old female. Patient presents with:  Dizziness: c/o dizziness for a while      HISTORY OF PRESENT ILLNESS  She presents with complaints of snoring, nasal obstruction as well as chronic imbalance.  She has had a significant work 0.5 mg p.o. twice daily as needed.  Generally she uses 1 tablet in fact will go days without using a tablet at all.  She does note that at times she has fasciculation of her tongue associated with her dizziness.  No new signs, symptoms or complaints.     7 • Stroke Mother 78        CVA   • Hypertension Mother    • Diabetes Sister         type 2 - half (P)   • Colon Cancer Neg         close relative   • Glaucoma Neg    • Macular degeneration Neg        Past Medical History:   Diagnosis Date   • Acute pyelon Fundus - Right: Normal, Left: Normal.   Neurological Normal Memory - Normal. Cranial nerves - Cranial nerves II through XII grossly intact.    Head/Face Normal Facial features - Normal. Eyebrows - Normal. Skull - Normal.        Nasopharynx Normal External n daily as needed for cough. (Patient not taking: Reported on 8/6/2020 ), Disp: 30 capsule, Rfl: 0  •  methylPREDNISolone (MEDROL) 4 MG Oral Tablet Therapy Pack, As directed.  (Patient not taking: Reported on 8/6/2020 ), Disp: 1 kit, Rfl: 0  ASSESSMENT AND PL

## 2020-08-08 ENCOUNTER — OFFICE VISIT (OUTPATIENT)
Dept: INTERNAL MEDICINE CLINIC | Facility: CLINIC | Age: 55
End: 2020-08-08
Payer: MEDICAID

## 2020-08-08 VITALS
HEIGHT: 63 IN | WEIGHT: 154 LBS | HEART RATE: 82 BPM | SYSTOLIC BLOOD PRESSURE: 109 MMHG | BODY MASS INDEX: 27.29 KG/M2 | DIASTOLIC BLOOD PRESSURE: 72 MMHG | RESPIRATION RATE: 16 BRPM | TEMPERATURE: 99 F

## 2020-08-08 DIAGNOSIS — Z00.00 PHYSICAL EXAM: Primary | ICD-10-CM

## 2020-08-08 DIAGNOSIS — K21.00 GASTROESOPHAGEAL REFLUX DISEASE WITH ESOPHAGITIS: ICD-10-CM

## 2020-08-08 DIAGNOSIS — Z20.822 EXPOSURE TO COVID-19 VIRUS: ICD-10-CM

## 2020-08-08 DIAGNOSIS — R25.3 FASCICULATION OF TONGUE: ICD-10-CM

## 2020-08-08 DIAGNOSIS — Z87.898 HX OF DIZZINESS: ICD-10-CM

## 2020-08-08 DIAGNOSIS — E55.9 VITAMIN D DEFICIENCY: ICD-10-CM

## 2020-08-08 PROBLEM — R42 DIZZINESS: Status: ACTIVE | Noted: 2020-08-08

## 2020-08-08 PROCEDURE — 3074F SYST BP LT 130 MM HG: CPT | Performed by: INTERNAL MEDICINE

## 2020-08-08 PROCEDURE — 99396 PREV VISIT EST AGE 40-64: CPT | Performed by: INTERNAL MEDICINE

## 2020-08-08 PROCEDURE — 3008F BODY MASS INDEX DOCD: CPT | Performed by: INTERNAL MEDICINE

## 2020-08-08 PROCEDURE — 3078F DIAST BP <80 MM HG: CPT | Performed by: INTERNAL MEDICINE

## 2020-08-08 RX ORDER — CLONAZEPAM 0.5 MG/1
TABLET ORAL
Qty: 30 TABLET | Refills: 2 | COMMUNITY
Start: 2020-08-08 | End: 2021-01-18

## 2020-08-08 RX ORDER — ERGOCALCIFEROL 1.25 MG/1
50000 CAPSULE ORAL WEEKLY
Qty: 4 CAPSULE | Refills: 1 | Status: CANCELLED | OUTPATIENT
Start: 2020-08-08

## 2020-08-08 RX ORDER — OMEPRAZOLE 40 MG/1
40 CAPSULE, DELAYED RELEASE ORAL DAILY PRN
Qty: 90 CAPSULE | Refills: 0 | Status: SHIPPED | OUTPATIENT
Start: 2020-08-08 | End: 2021-04-30

## 2020-08-08 NOTE — PROGRESS NOTES
Sofi Kat is a 54year old female.   Patient presents with:  Physical      HPI:   Patient comes for physical exam   C/C physical  C/o declines mammogram for this year–states is too painful and she is nervous about COVID-had long discussion with her b nayeli      Works as a  nurse            Current Outpatient Medications   Medication Sig Dispense Refill   • Omeprazole 40 MG Oral Capsule Delayed Release Take 1 capsule (40 mg total) by mouth daily as needed.  TAKE 1 CAPSULE BY MOUTH ONCE DAILY 90 capsule any unusual skin lesions or rashes  RESPIRATORY: denies shortness of breath, cough, wheezing  CARDIOVASCULAR: denies chest pain on exertion, palpitations, swelling in feet  GI: denies abdominal pain and denies heartburn, nausea or vomiting  : No Pain on by mouth once a week.             Preventative medicine   labs 7/19   pap 4/18- dr Ovi Back -normal  Mammogram 7/19 ,declined mammogram for this yr , scared to go and   cscope - 2011           The patient indicates understanding of these issues and agrees

## 2020-08-29 ENCOUNTER — LAB ENCOUNTER (OUTPATIENT)
Dept: LAB | Age: 55
End: 2020-08-29
Attending: INTERNAL MEDICINE
Payer: MEDICAID

## 2020-08-29 DIAGNOSIS — Z00.00 PHYSICAL EXAM: ICD-10-CM

## 2020-08-29 DIAGNOSIS — Z20.822 EXPOSURE TO COVID-19 VIRUS: ICD-10-CM

## 2020-08-29 DIAGNOSIS — E55.9 VITAMIN D DEFICIENCY: ICD-10-CM

## 2020-08-29 LAB
ALBUMIN SERPL-MCNC: 3.9 G/DL (ref 3.4–5)
ALBUMIN/GLOB SERPL: 1 {RATIO} (ref 1–2)
ALP LIVER SERPL-CCNC: 79 U/L (ref 41–108)
ALT SERPL-CCNC: 97 U/L (ref 13–56)
ANION GAP SERPL CALC-SCNC: 7 MMOL/L (ref 0–18)
AST SERPL-CCNC: 44 U/L (ref 15–37)
BASOPHILS # BLD AUTO: 0.04 X10(3) UL (ref 0–0.2)
BASOPHILS NFR BLD AUTO: 0.7 %
BILIRUB SERPL-MCNC: 0.4 MG/DL (ref 0.1–2)
BUN BLD-MCNC: 17 MG/DL (ref 7–18)
BUN/CREAT SERPL: 24.3 (ref 10–20)
CALCIUM BLD-MCNC: 9.2 MG/DL (ref 8.5–10.1)
CHLORIDE SERPL-SCNC: 110 MMOL/L (ref 98–112)
CHOLEST SMN-MCNC: 202 MG/DL (ref ?–200)
CO2 SERPL-SCNC: 25 MMOL/L (ref 21–32)
CREAT BLD-MCNC: 0.7 MG/DL (ref 0.55–1.02)
DEPRECATED RDW RBC AUTO: 39.9 FL (ref 35.1–46.3)
EOSINOPHIL # BLD AUTO: 0.17 X10(3) UL (ref 0–0.7)
EOSINOPHIL NFR BLD AUTO: 3 %
ERYTHROCYTE [DISTWIDTH] IN BLOOD BY AUTOMATED COUNT: 12.6 % (ref 11–15)
GLOBULIN PLAS-MCNC: 3.8 G/DL (ref 2.8–4.4)
GLUCOSE BLD-MCNC: 83 MG/DL (ref 70–99)
HCT VFR BLD AUTO: 42.1 % (ref 35–48)
HDLC SERPL-MCNC: 50 MG/DL (ref 40–59)
HGB BLD-MCNC: 14 G/DL (ref 12–16)
IMM GRANULOCYTES # BLD AUTO: 0.02 X10(3) UL (ref 0–1)
IMM GRANULOCYTES NFR BLD: 0.4 %
LDLC SERPL CALC-MCNC: 133 MG/DL (ref ?–100)
LYMPHOCYTES # BLD AUTO: 1.99 X10(3) UL (ref 1–4)
LYMPHOCYTES NFR BLD AUTO: 35 %
M PROTEIN MFR SERPL ELPH: 7.7 G/DL (ref 6.4–8.2)
MCH RBC QN AUTO: 28.9 PG (ref 26–34)
MCHC RBC AUTO-ENTMCNC: 33.3 G/DL (ref 31–37)
MCV RBC AUTO: 87 FL (ref 80–100)
MONOCYTES # BLD AUTO: 0.42 X10(3) UL (ref 0.1–1)
MONOCYTES NFR BLD AUTO: 7.4 %
NEUTROPHILS # BLD AUTO: 3.05 X10 (3) UL (ref 1.5–7.7)
NEUTROPHILS # BLD AUTO: 3.05 X10(3) UL (ref 1.5–7.7)
NEUTROPHILS NFR BLD AUTO: 53.5 %
NONHDLC SERPL-MCNC: 152 MG/DL (ref ?–130)
OSMOLALITY SERPL CALC.SUM OF ELEC: 295 MOSM/KG (ref 275–295)
PATIENT FASTING Y/N/NP: YES
PATIENT FASTING Y/N/NP: YES
PLATELET # BLD AUTO: 247 10(3)UL (ref 150–450)
POTASSIUM SERPL-SCNC: 4 MMOL/L (ref 3.5–5.1)
RBC # BLD AUTO: 4.84 X10(6)UL (ref 3.8–5.3)
SODIUM SERPL-SCNC: 142 MMOL/L (ref 136–145)
TRIGL SERPL-MCNC: 94 MG/DL (ref 30–149)
TSI SER-ACNC: 2.03 MIU/ML (ref 0.36–3.74)
VLDLC SERPL CALC-MCNC: 19 MG/DL (ref 0–30)
WBC # BLD AUTO: 5.7 X10(3) UL (ref 4–11)

## 2020-08-29 PROCEDURE — 84443 ASSAY THYROID STIM HORMONE: CPT

## 2020-08-29 PROCEDURE — 36415 COLL VENOUS BLD VENIPUNCTURE: CPT

## 2020-08-29 PROCEDURE — 86769 SARS-COV-2 COVID-19 ANTIBODY: CPT

## 2020-08-29 PROCEDURE — 80053 COMPREHEN METABOLIC PANEL: CPT

## 2020-08-29 PROCEDURE — 80061 LIPID PANEL: CPT

## 2020-08-29 PROCEDURE — 82306 VITAMIN D 25 HYDROXY: CPT

## 2020-08-29 PROCEDURE — 85025 COMPLETE CBC W/AUTO DIFF WBC: CPT

## 2020-08-30 LAB — SARS-COV-2 IGG SERPLBLD QL IA.RAPID: NEGATIVE

## 2020-08-31 ENCOUNTER — TELEPHONE (OUTPATIENT)
Dept: OBGYN CLINIC | Facility: CLINIC | Age: 55
End: 2020-08-31

## 2020-08-31 LAB — 25(OH)D3 SERPL-MCNC: 25.5 NG/ML (ref 30–100)

## 2020-09-01 ENCOUNTER — TELEPHONE (OUTPATIENT)
Dept: INTERNAL MEDICINE CLINIC | Facility: CLINIC | Age: 55
End: 2020-09-01

## 2020-09-01 DIAGNOSIS — R74.8 ELEVATED LIVER ENZYMES: Primary | ICD-10-CM

## 2020-09-01 NOTE — TELEPHONE ENCOUNTER
Patient is asking if you can add your comments to her blood work from Saturday, specifically kidney and liver function tests.

## 2020-09-01 NOTE — TELEPHONE ENCOUNTER
Pt contacted and informed of her test results from 08/29/2020. Pt verbal understanding and she will do her Hepatitis also.

## 2020-09-09 ENCOUNTER — PATIENT MESSAGE (OUTPATIENT)
Dept: INTERNAL MEDICINE CLINIC | Facility: CLINIC | Age: 55
End: 2020-09-09

## 2020-09-09 DIAGNOSIS — R74.8 ELEVATED LIVER ENZYMES: Primary | ICD-10-CM

## 2020-09-10 ENCOUNTER — PATIENT MESSAGE (OUTPATIENT)
Dept: INTERNAL MEDICINE CLINIC | Facility: CLINIC | Age: 55
End: 2020-09-10

## 2020-09-10 DIAGNOSIS — R74.8 ELEVATED LIVER ENZYMES: Primary | ICD-10-CM

## 2020-09-10 NOTE — TELEPHONE ENCOUNTER
From: Ewa Boggs  To:  Stacie Montero MD  Sent: 9/9/2020 1:44 PM CDT  Subject: Test Results Question    Chet concern about my Liver test results   They keep getting high  I know you order hepatitis B  (Blood test)   Didn't you order the same test before

## 2020-09-11 NOTE — TELEPHONE ENCOUNTER
From: Leandro Boggs  To: Keara Mota MD  Sent: 9/10/2020 5:40 PM CDT  Subject: Referral Request    I made an appointment with my gastroenterologist   Dr. Simona Jamison   I am scheduled to see him on October 8  Thank you.

## 2020-11-20 ENCOUNTER — TELEPHONE (OUTPATIENT)
Dept: GASTROENTEROLOGY | Facility: CLINIC | Age: 55
End: 2020-11-20

## 2020-11-20 ENCOUNTER — OFFICE VISIT (OUTPATIENT)
Dept: GASTROENTEROLOGY | Facility: CLINIC | Age: 55
End: 2020-11-20
Payer: MEDICAID

## 2020-11-20 VITALS
BODY MASS INDEX: 27.82 KG/M2 | DIASTOLIC BLOOD PRESSURE: 67 MMHG | HEART RATE: 95 BPM | HEIGHT: 63 IN | SYSTOLIC BLOOD PRESSURE: 100 MMHG | WEIGHT: 157 LBS

## 2020-11-20 DIAGNOSIS — K21.00 GASTROESOPHAGEAL REFLUX DISEASE WITH ESOPHAGITIS WITHOUT HEMORRHAGE: ICD-10-CM

## 2020-11-20 DIAGNOSIS — R79.89 ABNORMAL LFTS: Primary | ICD-10-CM

## 2020-11-20 DIAGNOSIS — K75.81 NASH (NONALCOHOLIC STEATOHEPATITIS): ICD-10-CM

## 2020-11-20 PROBLEM — R13.19 OTHER DYSPHAGIA: Status: RESOLVED | Noted: 2018-04-25 | Resolved: 2020-11-20

## 2020-11-20 PROBLEM — K76.0 FATTY LIVER: Status: RESOLVED | Noted: 2019-06-10 | Resolved: 2020-11-20

## 2020-11-20 PROBLEM — R14.0 ABDOMINAL BLOATING: Status: RESOLVED | Noted: 2018-04-25 | Resolved: 2020-11-20

## 2020-11-20 PROCEDURE — 99214 OFFICE O/P EST MOD 30 MIN: CPT | Performed by: INTERNAL MEDICINE

## 2020-11-20 PROCEDURE — 3008F BODY MASS INDEX DOCD: CPT | Performed by: INTERNAL MEDICINE

## 2020-11-20 PROCEDURE — 3074F SYST BP LT 130 MM HG: CPT | Performed by: INTERNAL MEDICINE

## 2020-11-20 PROCEDURE — 3078F DIAST BP <80 MM HG: CPT | Performed by: INTERNAL MEDICINE

## 2020-11-20 RX ORDER — POLYETHYLENE GLYCOL 3350, SODIUM CHLORIDE, SODIUM BICARBONATE, POTASSIUM CHLORIDE 420; 11.2; 5.72; 1.48 G/4L; G/4L; G/4L; G/4L
POWDER, FOR SOLUTION ORAL
Qty: 1 BOTTLE | Refills: 0 | Status: ON HOLD | OUTPATIENT
Start: 2020-11-20 | End: 2021-04-14

## 2020-11-20 NOTE — H&P
Attending: Kiki Kidd. Dae Wells MD  Referring MD: Alonzo Escobedo MD  Procedure:  Injectable loop recorder implant (ILR-Linq)  Indication:  Atrial fibrillation  Complications: None  EBL: N/A     Implanted Device: Medtronic Model [id number]; SN [id number]    After informed consent was obtained the patient was brought to the electrophysiology lab in an unsedated state. The left parasternal region was prepped and draped in a sterile fashion. The fourth intercostal spaced was identified and that region was locally anesthetized with approximately 10-20 cc of 1% lidocaine solution. A 1 cm punch incision was made along the left sternal border at the 4th intercostal space using the Medtronic Linq scalpel tool. Next, the Linq implantable loop recorder (ILR) was percutaneously injected via the punch incision under the skin and above the pectoralis muscle. The implant tool was removed and the ILR was stably located under the skin. The ILR was interrogated and demonstrated adequate sensing. It was programmed per clinical specifications. The incision was closed with 4-0 Vicryl and poly-cyanocrylate solution (dermabond) solution after hemostasis was confirmed and a sterile dressing was placed. he tolerated the procedure well and there were no complications. Device Information  Monitor Model #  Manfacturer  Serial #  Location    Reveal LINQ [id number]  Medtronic  [id number]  left pectoral, subcutaneous      Settings were performed per 's recommendations. AT/AF Detection: ON    Sensing: sensitivity 0.035 mV (35 uV)    Impression: Successful ILR implant. Plan:  -Discharge home.  -Routine monitoring.  -Device clinic follow up in 1-2 weeks. Kiki Kidd.  Dae Wells MD, [address] [place] [address] Cardiac Electrophysiology  [facility] Cardiology [facility] - Gastroenterology                                                                                                                 Reason for consult: tachycardia) ([facility] 1995    Status post ablation   • Vestibular disorder    • Vitamin B12 deficiency     weekly B12 therapy      Past Surgical History:   Procedure Laterality Date   • ABDOMINOPLASTY  2004/2005    NGOZI MEMBRENO   • COLONOSCOPY  11/23/2011    pe nightly. 30 tablet 5   • ketoconazole 2 % External Cream Apply 1 Application topically 2 (two) times daily.  30 g 2       Allergies:  No Known Allergies    ROS:   CONSTITUTIONAL:  negative for fevers, rigors  EYES:  negative for diplopia   RESPIRATORY:  neg polyps in 2011 - repeat in 2021    #RUQ pain mild, prior EGD reviewed. Repeat Ab US to r/out gallstones. Recommendations:  1. Work on 10-15 lbs weight loss in 6 months.  To get help with weight loss, I recommend seeing Dr. Starr Valencia at [facility]

## 2020-11-20 NOTE — PATIENT INSTRUCTIONS
1. Work on 10-15 lbs weight loss in 6 months. To get help with weight loss, I recommend seeing Dr. Royal Boudreaux at [facility] [address]. Please call Phone: [phone] to set up appointment. 2. No alcohol  3.  Tylenol - okay, but less than 2 grams a

## 2020-11-20 NOTE — TELEPHONE ENCOUNTER
Scheduled for:  Colonoscopy 56547  Provider Name:  Dr Enrrique Butcher  Date:  01/20/21  Location:  [facility]  Sedation:  MAC  Time:  4362 (pt is aware to arrive at [address] [place] [address])  Prep:  Single Dose Trilyte  Meds/Allergies Reconciled?:  Physician reviewed  Diagnosis with codes:  A

## 2020-12-23 ENCOUNTER — TELEPHONE (OUTPATIENT)
Dept: GASTROENTEROLOGY | Facility: CLINIC | Age: 55
End: 2020-12-23

## 2020-12-23 NOTE — TELEPHONE ENCOUNTER
Overdue reminder letter mailed out to patient.     Labs/Imaging:   ACTIN (SMOOTH MUSCLE) ANTIBODY   ALPHA-1-ANTITRYPSIN, SERUM   KAMILA, DIRECT SCREEN   CERULOPLASMIN   FERRITIN   HEP A AB, TOTAL   IMMUNOGLOBULIN A/G/M, QUANT   IRON AND TIBC   MITOCHONDRIAL (M

## 2021-01-16 ENCOUNTER — LAB ENCOUNTER (OUTPATIENT)
Dept: LAB | Age: 56
End: 2021-01-16
Attending: INTERNAL MEDICINE
Payer: MEDICAID

## 2021-01-16 DIAGNOSIS — R74.8 ELEVATED LIVER ENZYMES: ICD-10-CM

## 2021-01-16 DIAGNOSIS — R79.89 ABNORMAL LFTS: ICD-10-CM

## 2021-01-16 DIAGNOSIS — Z01.818 PRE-OP TESTING: ICD-10-CM

## 2021-01-16 LAB
DEPRECATED HBV CORE AB SER IA-ACNC: 90.3 NG/ML
GGT SERPL-CCNC: 20 U/L
HAV AB SER QL IA: REACTIVE
HAV IGM SER QL: NONREACTIVE
HBV CORE IGM SER QL: NONREACTIVE
HBV SURFACE AB SER QL: NONREACTIVE
HBV SURFACE AB SERPL IA-ACNC: <3.1 MIU/ML
HBV SURFACE AG SER-ACNC: <0.1 [IU]/L
HBV SURFACE AG SERPL QL IA: NONREACTIVE
HCV AB SERPL QL IA: NONREACTIVE
IGA SERPL-MCNC: 254 MG/DL (ref 70–312)
IGM SERPL-MCNC: 103 MG/DL (ref 43–279)
IMMUNOGLOBULIN PNL SER-MCNC: 1090 MG/DL (ref 791–1643)
INR BLD: 0.99 (ref 0.9–1.2)
IRON SATURATION: 26 %
IRON SERPL-MCNC: 126 UG/DL
PROTHROMBIN TIME: 12.9 SECONDS (ref 11.8–14.5)
TOTAL IRON BINDING CAPACITY: 478 UG/DL (ref 240–450)
TRANSFERRIN SERPL-MCNC: 321 MG/DL (ref 200–360)

## 2021-01-16 PROCEDURE — 86255 FLUORESCENT ANTIBODY SCREEN: CPT

## 2021-01-16 PROCEDURE — 87340 HEPATITIS B SURFACE AG IA: CPT

## 2021-01-16 PROCEDURE — 86705 HEP B CORE ANTIBODY IGM: CPT

## 2021-01-16 PROCEDURE — 82390 ASSAY OF CERULOPLASMIN: CPT

## 2021-01-16 PROCEDURE — 86256 FLUORESCENT ANTIBODY TITER: CPT

## 2021-01-16 PROCEDURE — 86709 HEPATITIS A IGM ANTIBODY: CPT

## 2021-01-16 PROCEDURE — 36415 COLL VENOUS BLD VENIPUNCTURE: CPT

## 2021-01-16 PROCEDURE — 86803 HEPATITIS C AB TEST: CPT

## 2021-01-16 PROCEDURE — 83540 ASSAY OF IRON: CPT

## 2021-01-16 PROCEDURE — 82103 ALPHA-1-ANTITRYPSIN TOTAL: CPT

## 2021-01-16 PROCEDURE — 85610 PROTHROMBIN TIME: CPT

## 2021-01-16 PROCEDURE — 82728 ASSAY OF FERRITIN: CPT

## 2021-01-16 PROCEDURE — 86706 HEP B SURFACE ANTIBODY: CPT

## 2021-01-16 PROCEDURE — 82977 ASSAY OF GGT: CPT

## 2021-01-16 PROCEDURE — 86038 ANTINUCLEAR ANTIBODIES: CPT

## 2021-01-16 PROCEDURE — 86708 HEPATITIS A ANTIBODY: CPT

## 2021-01-16 PROCEDURE — 82784 ASSAY IGA/IGD/IGG/IGM EACH: CPT

## 2021-01-16 PROCEDURE — 84466 ASSAY OF TRANSFERRIN: CPT

## 2021-01-17 LAB
A1AT SERPL-MCNC: 136 MG/DL (ref 90–200)
CERULOPLASMIN SERPL-MCNC: 29.4 MG/DL (ref 20–60)
SARS-COV-2 RNA RESP QL NAA+PROBE: NOT DETECTED

## 2021-01-18 ENCOUNTER — PATIENT MESSAGE (OUTPATIENT)
Dept: GASTROENTEROLOGY | Facility: CLINIC | Age: 56
End: 2021-01-18

## 2021-01-18 ENCOUNTER — TELEPHONE (OUTPATIENT)
Dept: GASTROENTEROLOGY | Facility: CLINIC | Age: 56
End: 2021-01-18

## 2021-01-18 ENCOUNTER — OFFICE VISIT (OUTPATIENT)
Dept: OTOLARYNGOLOGY | Facility: CLINIC | Age: 56
End: 2021-01-18
Payer: MEDICAID

## 2021-01-18 VITALS
BODY MASS INDEX: 27.82 KG/M2 | WEIGHT: 157 LBS | SYSTOLIC BLOOD PRESSURE: 102 MMHG | HEIGHT: 63 IN | TEMPERATURE: 97 F | DIASTOLIC BLOOD PRESSURE: 64 MMHG

## 2021-01-18 DIAGNOSIS — R79.89 ABNORMAL LFTS: Primary | ICD-10-CM

## 2021-01-18 DIAGNOSIS — Z87.898 HX OF DIZZINESS: ICD-10-CM

## 2021-01-18 DIAGNOSIS — R42 DIZZINESS: Primary | ICD-10-CM

## 2021-01-18 DIAGNOSIS — K75.81 NASH (NONALCOHOLIC STEATOHEPATITIS): ICD-10-CM

## 2021-01-18 LAB — NUCLEAR IGG TITR SER IF: NEGATIVE {TITER}

## 2021-01-18 PROCEDURE — 3008F BODY MASS INDEX DOCD: CPT | Performed by: OTOLARYNGOLOGY

## 2021-01-18 PROCEDURE — 99213 OFFICE O/P EST LOW 20 MIN: CPT | Performed by: OTOLARYNGOLOGY

## 2021-01-18 PROCEDURE — 3074F SYST BP LT 130 MM HG: CPT | Performed by: OTOLARYNGOLOGY

## 2021-01-18 PROCEDURE — 3078F DIAST BP <80 MM HG: CPT | Performed by: OTOLARYNGOLOGY

## 2021-01-18 RX ORDER — AZELASTINE 1 MG/ML
2 SPRAY, METERED NASAL 2 TIMES DAILY
Qty: 6 BOTTLE | Refills: 0 | Status: SHIPPED | OUTPATIENT
Start: 2021-01-18

## 2021-01-18 RX ORDER — CLONAZEPAM 0.5 MG/1
TABLET ORAL
Qty: 30 TABLET | Refills: 3 | Status: SHIPPED | OUTPATIENT
Start: 2021-01-18 | End: 2021-06-15

## 2021-01-18 RX ORDER — MONTELUKAST SODIUM 10 MG/1
10 TABLET ORAL NIGHTLY
Qty: 90 TABLET | Refills: 0 | Status: SHIPPED | OUTPATIENT
Start: 2021-01-18 | End: 2021-09-15

## 2021-01-18 NOTE — TELEPHONE ENCOUNTER
Schedulers:  Please contact patient to reschedule colonoscopy that was originally scheduled for 1/20/21. She fell on some ice and is pain and wants to schedule for a later date.     I already removed the 1/20/21 procedure from the appointment desk and surg

## 2021-01-18 NOTE — TELEPHONE ENCOUNTER
This refill request is being sent to the provider for the following reason:  [x]Patient did not complete follow up recommendations,pt has appt today on 1/18/2021

## 2021-01-18 NOTE — TELEPHONE ENCOUNTER
I called the patient. She wants to reschedule her procedure because she fell on some ice and has bruising from legs to ribs. She is seeing Dr. Yi Huffman today for this and wants to reschedule colonoscopy. Procedure canceled from appointment desk.     Surg

## 2021-01-18 NOTE — PROGRESS NOTES
Russell Herrera is a 54year old female.   Patient presents with:  Dizziness: Patient Presents with: follow up regarding Dizziness, per pt need refills on medications       HISTORY OF PRESENT ILLNESS    She presents with complaints of snoring, nasal obstru lot of stress anxiety and has increased her use of clonazepam to 0.5 mg p.o. twice daily as needed.  Generally she uses 1 tablet in fact will go days without using a tablet at all. Vijaya Chamberlain does note that at times she has fasciculation of her tongue associated 2-3 cigarettes daily    Substance and Sexual Activity      Alcohol use:  Yes        Alcohol/week: 1.0 standard drinks        Types: 1 Standard drinks or equivalent per week        Comment: socially      Drug use: No      Sexual activity: Never        Birth infections, pigment change and rash. Hema/Lymph Negative Easy bleeding and easy bruising.            PHYSICAL EXAM    /64 (BP Location: Left arm, Patient Position: Sitting, Cuff Size: adult)   Temp 96.7 °F (35.9 °C) (Tympanic)   Ht 5' 3\" (1.6 m) Tab, Take half tab daily, Disp: 30 tablet, Rfl: 2  •  Loratadine (CLARITIN OR), , Disp: , Rfl:   •  triamcinolone acetonide 0.025 % External Cream, DO NOT PUT NEAR EYES OR EYELID. Apply 1-2 times daily as needed to affected areas for up to 7 days. , Disp: 8

## 2021-01-18 NOTE — TELEPHONE ENCOUNTER
Dr. Lawrence Graham     Please advise on results of labs. The Actin and mitochondrial (M2) antibody are still pending.     Thank you

## 2021-01-18 NOTE — TELEPHONE ENCOUNTER
From: Rocco Trinidad  To: Coty Martin MD  Sent: 1/18/2021 11:45 AM CST  Subject: Other    Do to a family emergency   I will need to re-schedule my colonoscopy   If someone can please call me   Thank you

## 2021-01-18 NOTE — TELEPHONE ENCOUNTER
From: Marilin Berkowitz  To: Jere Bustos MD  Sent: 1/18/2021 11:46 AM CST  Subject: Test Results Question    Can someone please call me regarding my test results thank you

## 2021-01-19 ENCOUNTER — PATIENT MESSAGE (OUTPATIENT)
Dept: GASTROENTEROLOGY | Facility: CLINIC | Age: 56
End: 2021-01-19

## 2021-01-19 ENCOUNTER — PATIENT MESSAGE (OUTPATIENT)
Dept: INTERNAL MEDICINE CLINIC | Facility: CLINIC | Age: 56
End: 2021-01-19

## 2021-01-19 LAB
MITOCHONDRIA AB TITR SER: 160 {TITER}
SMOOTH MUSCLE AB TITR SER: <20 {TITER}

## 2021-01-19 NOTE — TELEPHONE ENCOUNTER
From: Bill Loving  To: Clifford Villanueva MD  Sent: 1/19/2021 9:50 AM CST  Subject: Test Results Question    I have a question about IRON AND TIBC resulted on 1/16/21, 3:03 PM.    I rayray binding is high should I be concern? ?

## 2021-01-20 ENCOUNTER — PATIENT MESSAGE (OUTPATIENT)
Dept: GASTROENTEROLOGY | Facility: CLINIC | Age: 56
End: 2021-01-20

## 2021-01-20 NOTE — TELEPHONE ENCOUNTER
From: Femi Boggs  To:  Michael Porter MD  Sent: 1/19/2021 9:10 PM CST  Subject: Test Results Question    I have a question about GGT (GAMMA GLUTAMYL TRANSPEPTIDASE) resulted on 1/16/21, 3:03 PM. What are you rolling up with this test

## 2021-01-20 NOTE — TELEPHONE ENCOUNTER
From: Tuan Heading  To: Ilana Lewis MD  Sent: 1/20/2021 10:12 AM CST  Subject: Test Results Question    Do you have all my test results? ???

## 2021-01-20 NOTE — TELEPHONE ENCOUNTER
I left a detailed message for patient on her VM. Anti-mitochronrial ab elevated- possible PBC? But immunoglobumins normal, so may be false positive. Asked her to proceed with aB US as ordered in Nov and see me in office in 2-3 months.     Will repeat

## 2021-01-20 NOTE — TELEPHONE ENCOUNTER
From: Porter Liz  To: Jose Juan Grigsby MD  Sent: 1/19/2021 9:18 PM CST  Subject: Test Results Question    I have a question about MITOCHONDRIAL (M2) ANTIBODY resulted on 1/19/21, 2:34 PM.this looks very high   Should I be concerned Please explain what

## 2021-01-20 NOTE — TELEPHONE ENCOUNTER
From: Leandro Boggs  To: Clifford Villanueva MD  Sent: 1/20/2021 8:43 AM CST  Subject: Test Results Question    Thank you

## 2021-01-29 ENCOUNTER — TELEPHONE (OUTPATIENT)
Dept: GASTROENTEROLOGY | Facility: CLINIC | Age: 56
End: 2021-01-29

## 2021-01-29 ENCOUNTER — OFFICE VISIT (OUTPATIENT)
Dept: GASTROENTEROLOGY | Facility: CLINIC | Age: 56
End: 2021-01-29
Payer: MEDICAID

## 2021-01-29 VITALS
DIASTOLIC BLOOD PRESSURE: 64 MMHG | BODY MASS INDEX: 28.06 KG/M2 | SYSTOLIC BLOOD PRESSURE: 99 MMHG | WEIGHT: 158.38 LBS | HEART RATE: 82 BPM | HEIGHT: 63 IN

## 2021-01-29 DIAGNOSIS — K75.81 NASH (NONALCOHOLIC STEATOHEPATITIS): ICD-10-CM

## 2021-01-29 DIAGNOSIS — R79.89 ABNORMAL LFTS: Primary | ICD-10-CM

## 2021-01-29 PROCEDURE — 99213 OFFICE O/P EST LOW 20 MIN: CPT | Performed by: INTERNAL MEDICINE

## 2021-01-29 PROCEDURE — 3008F BODY MASS INDEX DOCD: CPT | Performed by: INTERNAL MEDICINE

## 2021-01-29 PROCEDURE — 3078F DIAST BP <80 MM HG: CPT | Performed by: INTERNAL MEDICINE

## 2021-01-29 PROCEDURE — 3074F SYST BP LT 130 MM HG: CPT | Performed by: INTERNAL MEDICINE

## 2021-01-29 RX ORDER — LORATADINE 10 MG/1
10 TABLET ORAL DAILY
COMMUNITY
Start: 2021-01-18 | End: 2021-09-15

## 2021-01-29 NOTE — PATIENT INSTRUCTIONS
1. Go for ultrasound of liver  2. Repeat blood test in March 2021 (check anti-mitochrondrial antibody)  3.  See me for ultrasound

## 2021-01-29 NOTE — PROGRESS NOTES
[facility] Follow-up Visit    Estrellita kg)  01/18/21 : 157 lb (71.2 kg)  11/20/20 : 157 lb (71.2 kg)  08/08/20 : 154 lb (69.9 kg)  08/06/20 : 150 lb (68 kg)  03/05/20 : 153 lb (69.4 kg)       History, Medications, Allergies, ROS:      Past Medical History:   Diagnosis Date   • Acute pyelonephri Nasal route 2 (two) times daily. 6 Bottle 0   • Omeprazole 40 MG Oral Capsule Delayed Release Take 1 capsule (40 mg total) by mouth daily as needed.  TAKE 1 CAPSULE BY MOUTH ONCE DAILY 90 capsule 0   • Loratadine (CLARITIN OR)      • PEG 3350-KCl-Na Bicarb- Patient's labs and imaging were reviewed and discussed with patient today. .  ASSESSMENT/PLAN:   Diane Green is a 54year old year-old female with history of GERD/esophagitis, who presents for evaluation of abnormal LFTs.      #Abnormal LFTs -N

## 2021-01-29 NOTE — TELEPHONE ENCOUNTER
Scheduled for:  Colonoscopy 38692  Provider Name:  Dr Stella Jeffery  Date:  4/14/21  Location:  [facility]  Sedation:  MAC  Time:  7187 (pt is aware to arrive at 0845)  Prep:  Single Dose Trilyte  Meds/Allergies Reconciled?:  Physician reviewed  Diagnosis with codes:   Ab

## 2021-01-30 NOTE — TELEPHONE ENCOUNTER
This patient has been scheduled.      Please see TE 1/29/21 for scheduling information    Closing this TE

## 2021-02-08 ENCOUNTER — HOSPITAL ENCOUNTER (OUTPATIENT)
Dept: ULTRASOUND IMAGING | Age: 56
Discharge: HOME | End: 2021-02-08
Attending: INTERNAL MEDICINE
Payer: MEDICAID

## 2021-02-08 ENCOUNTER — PATIENT MESSAGE (OUTPATIENT)
Dept: GASTROENTEROLOGY | Facility: CLINIC | Age: 56
End: 2021-02-08

## 2021-02-08 DIAGNOSIS — K75.81 NASH (NONALCOHOLIC STEATOHEPATITIS): ICD-10-CM

## 2021-02-08 DIAGNOSIS — R79.89 ABNORMAL LFTS: Primary | ICD-10-CM

## 2021-02-08 DIAGNOSIS — R79.89 ABNORMAL LFTS: ICD-10-CM

## 2021-02-08 PROCEDURE — 76705 ECHO EXAM OF ABDOMEN: CPT | Performed by: INTERNAL MEDICINE

## 2021-02-08 PROCEDURE — 76981 USE PARENCHYMA: CPT | Performed by: INTERNAL MEDICINE

## 2021-02-08 NOTE — TELEPHONE ENCOUNTER
From: Pradeep Cole  To: Rex Olmedo MD  Sent: 2/8/2021 10:10 AM CST  Subject: Test Results Question    I have a question about US LIVER WITH ELASTOGRAPHY(CPT=76705,06633) resulted on 2/8/21, 9:10 AM. I hope I don't need a biopsy? ??  Please let me k

## 2021-02-08 NOTE — TELEPHONE ENCOUNTER
Dr. Massimo Macias    Please advise on result of US liver with elastography done today/below message from patient.     Thank you

## 2021-02-08 NOTE — TELEPHONE ENCOUNTER
D/w Cristine- F1 minimal fibrosis. Work on weight loss for fatty liver. Repeat LFTs/AMA (prior abnormal) in April 2021. Ordered.

## 2021-02-18 ENCOUNTER — LAB ENCOUNTER (OUTPATIENT)
Dept: LAB | Age: 56
End: 2021-02-18
Attending: OBSTETRICS & GYNECOLOGY
Payer: MEDICAID

## 2021-02-18 ENCOUNTER — TELEPHONE (OUTPATIENT)
Dept: OBGYN CLINIC | Facility: CLINIC | Age: 56
End: 2021-02-18

## 2021-02-18 DIAGNOSIS — R51.9 ACUTE INTRACTABLE HEADACHE, UNSPECIFIED HEADACHE TYPE: Primary | ICD-10-CM

## 2021-02-18 DIAGNOSIS — R51.9 ACUTE INTRACTABLE HEADACHE, UNSPECIFIED HEADACHE TYPE: ICD-10-CM

## 2021-02-18 LAB — SARS-COV-2 RNA RESP QL NAA+PROBE: NOT DETECTED

## 2021-03-23 ENCOUNTER — TELEPHONE (OUTPATIENT)
Dept: OBGYN CLINIC | Facility: CLINIC | Age: 56
End: 2021-03-23

## 2021-03-23 NOTE — TELEPHONE ENCOUNTER
NJG called and stated to cancel appt for the pt today. She has another appt to go to today. Canceled the appt.

## 2021-03-23 NOTE — TELEPHONE ENCOUNTER
DINAH called and asked to put pt in [facility]'s appt time for 1400. Pt is [facility]'s . Appt for labia cyst.  Sent to [facility] as a FYI.

## 2021-04-12 ENCOUNTER — LAB ENCOUNTER (OUTPATIENT)
Dept: LAB | Age: 56
End: 2021-04-12
Attending: INTERNAL MEDICINE
Payer: MEDICAID

## 2021-04-12 DIAGNOSIS — Z01.818 PRE-OP TESTING: ICD-10-CM

## 2021-04-12 DIAGNOSIS — R79.89 ABNORMAL LFTS: ICD-10-CM

## 2021-04-12 PROCEDURE — 86255 FLUORESCENT ANTIBODY SCREEN: CPT

## 2021-04-12 PROCEDURE — 80076 HEPATIC FUNCTION PANEL: CPT

## 2021-04-12 PROCEDURE — 82784 ASSAY IGA/IGD/IGG/IGM EACH: CPT

## 2021-04-12 PROCEDURE — 36415 COLL VENOUS BLD VENIPUNCTURE: CPT

## 2021-04-14 ENCOUNTER — TELEPHONE (OUTPATIENT)
Dept: GASTROENTEROLOGY | Facility: CLINIC | Age: 56
End: 2021-04-14

## 2021-04-14 ENCOUNTER — HOSPITAL ENCOUNTER (OUTPATIENT)
Facility: HOSPITAL | Age: 56
Setting detail: HOSPITAL OUTPATIENT SURGERY
Discharge: HOME | End: 2021-04-14
Attending: INTERNAL MEDICINE | Admitting: INTERNAL MEDICINE
Payer: MEDICAID

## 2021-04-14 ENCOUNTER — ANESTHESIA EVENT (OUTPATIENT)
Dept: ENDOSCOPY | Facility: HOSPITAL | Age: 56
End: 2021-04-14
Payer: MEDICAID

## 2021-04-14 ENCOUNTER — ANESTHESIA (OUTPATIENT)
Dept: ENDOSCOPY | Facility: HOSPITAL | Age: 56
End: 2021-04-14
Payer: MEDICAID

## 2021-04-14 DIAGNOSIS — R79.89 ABNORMAL LFTS: ICD-10-CM

## 2021-04-14 DIAGNOSIS — Z01.818 PRE-OP TESTING: Primary | ICD-10-CM

## 2021-04-14 DIAGNOSIS — K75.81 NASH (NONALCOHOLIC STEATOHEPATITIS): ICD-10-CM

## 2021-04-14 PROCEDURE — 0DBK8ZX EXCISION OF ASCENDING COLON, VIA NATURAL OR ARTIFICIAL OPENING ENDOSCOPIC, DIAGNOSTIC: ICD-10-PCS | Performed by: INTERNAL MEDICINE

## 2021-04-14 PROCEDURE — 0DBM8ZX EXCISION OF DESCENDING COLON, VIA NATURAL OR ARTIFICIAL OPENING ENDOSCOPIC, DIAGNOSTIC: ICD-10-PCS | Performed by: INTERNAL MEDICINE

## 2021-04-14 PROCEDURE — 45385 COLONOSCOPY W/LESION REMOVAL: CPT | Performed by: INTERNAL MEDICINE

## 2021-04-14 RX ORDER — LIDOCAINE HYDROCHLORIDE 10 MG/ML
INJECTION, SOLUTION EPIDURAL; INFILTRATION; INTRACAUDAL; PERINEURAL AS NEEDED
Status: DISCONTINUED | OUTPATIENT
Start: 2021-04-14 | End: 2021-04-14 | Stop reason: SURG

## 2021-04-14 RX ORDER — SODIUM CHLORIDE, SODIUM LACTATE, POTASSIUM CHLORIDE, CALCIUM CHLORIDE 600; 310; 30; 20 MG/100ML; MG/100ML; MG/100ML; MG/100ML
INJECTION, SOLUTION INTRAVENOUS CONTINUOUS
Status: DISCONTINUED | OUTPATIENT
Start: 2021-04-14 | End: 2021-04-14

## 2021-04-14 RX ORDER — PHENYLEPHRINE HCL 10 MG/ML
VIAL (ML) INJECTION AS NEEDED
Status: DISCONTINUED | OUTPATIENT
Start: 2021-04-14 | End: 2021-04-14 | Stop reason: SURG

## 2021-04-14 RX ORDER — NALOXONE HYDROCHLORIDE 0.4 MG/ML
80 INJECTION, SOLUTION INTRAMUSCULAR; INTRAVENOUS; SUBCUTANEOUS AS NEEDED
Status: DISCONTINUED | OUTPATIENT
Start: 2021-04-14 | End: 2021-04-14

## 2021-04-14 RX ADMIN — LIDOCAINE HYDROCHLORIDE 50 MG: 10 INJECTION, SOLUTION EPIDURAL; INFILTRATION; INTRACAUDAL; PERINEURAL at 09:36:00

## 2021-04-14 RX ADMIN — PHENYLEPHRINE HCL 100 MCG: 10 MG/ML VIAL (ML) INJECTION at 09:49:00

## 2021-04-14 RX ADMIN — SODIUM CHLORIDE, SODIUM LACTATE, POTASSIUM CHLORIDE, CALCIUM CHLORIDE: 600; 310; 30; 20 INJECTION, SOLUTION INTRAVENOUS at 10:02:00

## 2021-04-14 RX ADMIN — PHENYLEPHRINE HCL 100 MCG: 10 MG/ML VIAL (ML) INJECTION at 09:57:00

## 2021-04-14 NOTE — PROGRESS NOTES
Patient complained of 6/10 pain after procedure in left abdomen. MD notified. Abdomen soft with tenderness. Patient able to eat/drink with no change in pain or nausea. Upon reassessment, patient stated pain was 8/10. MD notified and reassessed.  Patient  am

## 2021-04-14 NOTE — OPERATIVE REPORT
COLONOSCOPY REPORT    Artem Castillo     1965 Age 54year old   PCP Gale Little MD Endoscopist Otis Alvares MD     Date of procedure: 21    Procedure: Colonoscopy w/cold snare polypectomy    Pre-operative diagnosis: Screening sigmoid. 3. Terminal ileum: the visualized mucosa appeared normal.    4. A retroflexed view of the rectum revealed small internal hemorrhoids.     5. The colonic mucosa throughout the colon showed normal vascular pattern, without evidence of angioectasia

## 2021-04-14 NOTE — TELEPHONE ENCOUNTER
Discussed with kimberly today--LFTs increased and anti-mitochondrial antibody elevated as well (increased from previous). However immunoglobuins all normal . Will refer to Dr. Aubrey Worthington for assessment of autoimmune liver disease and possible liver biopsy.       FY

## 2021-04-14 NOTE — ANESTHESIA POSTPROCEDURE EVALUATION
Patient: Kellie Torres    Procedure Summary     Date: 04/14/21 Room / Location: [facility] ENDOSCOPY 01 / [facility] ENDOSCOPY    Anesthesia Start: 4461 Anesthesia Stop: 2774    Procedure: COLONOSCOPY (N/A ) Diagnosis:       Abnormal LFTs      GOEL (nonalcoholic stea

## 2021-04-14 NOTE — H&P
History & Physical Examination    Patient Name: Rocco Trinidad  MRN: [id number]  CSN: [id number]  YOB: 1965    Diagnosis: screening for colon cancer    loratadine 10 MG Oral Tab, Take 10 mg by mouth daily. , Disp: , Rfl: , 3/13/2021  jose Bilateral 8749/[id number]/3976     Family History   Problem Relation Age of Onset   • Stroke Father 67        CVA   • Hypertension Father    • Stroke Mother 78        CVA   • Hypertension Mother    • Diabetes Sister         type 2 - half (P)   • Colon Cancer Neg

## 2021-04-14 NOTE — ANESTHESIA PREPROCEDURE EVALUATION
Anesthesia PreOp Note    HPI:     Chang Pederson is a 54year old female who presents for preoperative consultation requested by: Colonel Long MD    Date of Surgery: 4/14/2021    Procedure(s):  COLONOSCOPY  Indication: Abnormal LFTs, GOEL (nonalcohol • COLONOSCOPY  11/23/2011    per NG   • COLONOSCOPY  2011       • ESOPHAGOGASTRODUODENOSCOPY (EGD) N/A 8/8/2018    Performed by Lilian Driscoll MD at [facility] ENDOSCOPY   • TUBAL LIGATION Bilateral 1990/1991/1993       loratadine 10 MG Oral Tab, Take Tobacco Use      Smoking status: Current Some Day Smoker        Packs/day: 0.10        Years: 20.00        Pack years: 2        Types: Cigarettes      Smokeless tobacco: Never Used      Tobacco comment: 2-3 cigarettes daily    Vaping Use      Vaping Use: N [employer]:       Active Member of Clubs or Organizations:       Attends Club or Organization Meetings:       Marital Status:   Intimate Partner Violence:       Fear of Current or Ex-Partner:       Emotionally Abused:       Physically Abused:

## 2021-04-14 NOTE — TELEPHONE ENCOUNTER
GI Clinical Staff:  Please cancel appt with me on 4/23. I saw patient today in endoscopy. No need to notify her, I have told her.  THx

## 2021-04-15 ENCOUNTER — TELEPHONE (OUTPATIENT)
Dept: GASTROENTEROLOGY | Facility: CLINIC | Age: 56
End: 2021-04-15

## 2021-04-15 VITALS
HEART RATE: 71 BPM | HEIGHT: 63 IN | DIASTOLIC BLOOD PRESSURE: 69 MMHG | SYSTOLIC BLOOD PRESSURE: 86 MMHG | WEIGHT: 158 LBS | RESPIRATION RATE: 16 BRPM | TEMPERATURE: 97 F | OXYGEN SATURATION: 99 % | BODY MASS INDEX: 28 KG/M2

## 2021-04-15 NOTE — TELEPHONE ENCOUNTER
Entered into Epic. Recall CLN in 5 years per Dr. Elisa Anderson. Last CLN done 04/14/2021. Recall entered into Patient Outreach for 04/14/2026.  updated. Per below, patient contacted with colonoscopy results and recommendations per Dr. Elisa Anderson.

## 2021-04-15 NOTE — OR NURSING
Called pt for follow up care. Pt had c/o anesthesia. Spoke in length with pt, expressed all her needs, after conversation pt felt comfortable and stated \" she felt much better after speaking with this rn\". Verbalized resolution of situation.

## 2021-04-15 NOTE — TELEPHONE ENCOUNTER
D/w patient, she is feeling well today after c-scope. Path from c-scope--->one polyp is sessile serrated adenoma. She will see Dr. Robles Montiel re: her LFTs/AMA+, ? Liver biopsy.       GI Clinical Staff:   Recall CLN in 5 years

## 2021-04-30 DIAGNOSIS — K21.00 GASTROESOPHAGEAL REFLUX DISEASE WITH ESOPHAGITIS: ICD-10-CM

## 2021-04-30 RX ORDER — OMEPRAZOLE 40 MG/1
CAPSULE, DELAYED RELEASE ORAL
Qty: 30 CAPSULE | Refills: 0 | Status: SHIPPED | OUTPATIENT
Start: 2021-04-30 | End: 2021-09-15

## 2021-05-13 ENCOUNTER — MOBILE ENCOUNTER (OUTPATIENT)
Dept: OBGYN CLINIC | Facility: CLINIC | Age: 56
End: 2021-05-13

## 2021-05-27 ENCOUNTER — NURSE TRIAGE (OUTPATIENT)
Dept: INTERNAL MEDICINE CLINIC | Facility: CLINIC | Age: 56
End: 2021-05-27

## 2021-05-27 ENCOUNTER — APPOINTMENT (OUTPATIENT)
Dept: GENERAL RADIOLOGY | Age: 56
End: 2021-05-27
Attending: NURSE PRACTITIONER
Payer: MEDICAID

## 2021-05-27 ENCOUNTER — HOSPITAL ENCOUNTER (OUTPATIENT)
Age: 56
Discharge: HOME | End: 2021-05-27
Payer: MEDICAID

## 2021-05-27 VITALS
DIASTOLIC BLOOD PRESSURE: 75 MMHG | RESPIRATION RATE: 18 BRPM | TEMPERATURE: 98 F | OXYGEN SATURATION: 99 % | SYSTOLIC BLOOD PRESSURE: 107 MMHG | HEART RATE: 82 BPM

## 2021-05-27 DIAGNOSIS — R53.83 FATIGUE, UNSPECIFIED TYPE: Primary | ICD-10-CM

## 2021-05-27 PROCEDURE — 93010 ELECTROCARDIOGRAM REPORT: CPT

## 2021-05-27 PROCEDURE — 84484 ASSAY OF TROPONIN QUANT: CPT

## 2021-05-27 PROCEDURE — 81002 URINALYSIS NONAUTO W/O SCOPE: CPT

## 2021-05-27 PROCEDURE — 93010 ELECTROCARDIOGRAM REPORT: CPT | Performed by: NURSE PRACTITIONER

## 2021-05-27 PROCEDURE — 80047 BASIC METABLC PNL IONIZED CA: CPT

## 2021-05-27 PROCEDURE — 99215 OFFICE O/P EST HI 40 MIN: CPT

## 2021-05-27 PROCEDURE — 99213 OFFICE O/P EST LOW 20 MIN: CPT

## 2021-05-27 PROCEDURE — 36415 COLL VENOUS BLD VENIPUNCTURE: CPT

## 2021-05-27 PROCEDURE — 71046 X-RAY EXAM CHEST 2 VIEWS: CPT | Performed by: NURSE PRACTITIONER

## 2021-05-27 PROCEDURE — 93005 ELECTROCARDIOGRAM TRACING: CPT

## 2021-05-27 PROCEDURE — 85378 FIBRIN DEGRADE SEMIQUANT: CPT | Performed by: NURSE PRACTITIONER

## 2021-05-27 PROCEDURE — 85025 COMPLETE CBC W/AUTO DIFF WBC: CPT | Performed by: NURSE PRACTITIONER

## 2021-05-27 NOTE — ED INITIAL ASSESSMENT (HPI)
Patient reports 2 week hx of fatigue as well as intermittent chest pressure that radiates to her back. Denies cough, denies fevers. Episode of emesis on Sunday after eating at a birthday party but denies episodes of emesis after that.  States she feels li

## 2021-05-27 NOTE — TELEPHONE ENCOUNTER
Action Requested: Summary for Provider     []  Critical Lab, Recommendations Needed  [] Need Additional Advice  []   FYI    []   Need Orders  [] Need Medications Sent to Pharmacy  []  Other     SUMMARY: advised appt, however no one available later in the e

## 2021-05-27 NOTE — ED PROVIDER NOTES
Patient Seen in: [facility]      History   Patient presents with:  Fatigue    Stated Complaint: fatigue; light headed    HPI/Subjective:   HPI    54-year-old female with a history of SVT, vitamin D deficiency, here today with complaints of fa headed  Other systems are as noted in HPI. Constitutional and vital signs reviewed. All other systems reviewed and negative except as noted above.     Physical Exam     ED Triage Vitals [05/27/21 1807]   /75   Pulse 82   Resp 18   Temp 97.9 °F ( elevation or depression, no ectopy, no ischemic changes.                05/27/21  1807   BP: 107/75   Pulse: 82   Resp: 18   Temp: 97.9 °F (36.6 °C)     I was wearing at minimum a facemask and eye protection throughout this encounter with handwashing perfor

## 2021-06-14 ENCOUNTER — OFFICE VISIT (OUTPATIENT)
Dept: SURGERY | Facility: CLINIC | Age: 56
End: 2021-06-14
Payer: MEDICAID

## 2021-06-14 VITALS
DIASTOLIC BLOOD PRESSURE: 67 MMHG | BODY MASS INDEX: 28.53 KG/M2 | SYSTOLIC BLOOD PRESSURE: 98 MMHG | WEIGHT: 161 LBS | RESPIRATION RATE: 16 BRPM | HEIGHT: 63 IN | OXYGEN SATURATION: 95 % | HEART RATE: 88 BPM

## 2021-06-14 DIAGNOSIS — Z87.898 HX OF DIZZINESS: ICD-10-CM

## 2021-06-14 DIAGNOSIS — R79.89 ELEVATED LIVER FUNCTION TESTS: Primary | ICD-10-CM

## 2021-06-14 NOTE — PROGRESS NOTES
[facility] at [facility]  [address], [address]  [address]  [phone] ([phone]) Hypertension Father    • Stroke Mother 78        CVA   • Hypertension Mother    • Diabetes Sister         type 2 - half (P)   • Colon Cancer Neg         close relative   • Glaucoma Neg    • Macular degeneration Neg       Social History    Tobacco Use any autoimmune cause and can further confirm fibrosis  - LFT pattern not typical for PBC but could be case of AIH as well with atypical serological profile and biopsy would help sort out  - She will plan on biopsy when returns from vacation.  Repeat LFT apollo

## 2021-06-15 ENCOUNTER — OFFICE VISIT (OUTPATIENT)
Dept: OTOLARYNGOLOGY | Facility: CLINIC | Age: 56
End: 2021-06-15
Payer: MEDICAID

## 2021-06-15 VITALS
WEIGHT: 160 LBS | DIASTOLIC BLOOD PRESSURE: 73 MMHG | BODY MASS INDEX: 28.35 KG/M2 | SYSTOLIC BLOOD PRESSURE: 110 MMHG | HEIGHT: 63 IN | TEMPERATURE: 98 F

## 2021-06-15 DIAGNOSIS — H92.01 RIGHT EAR PAIN: Primary | ICD-10-CM

## 2021-06-15 DIAGNOSIS — R42 DIZZINESS: ICD-10-CM

## 2021-06-15 PROCEDURE — 3078F DIAST BP <80 MM HG: CPT | Performed by: OTOLARYNGOLOGY

## 2021-06-15 PROCEDURE — 3008F BODY MASS INDEX DOCD: CPT | Performed by: OTOLARYNGOLOGY

## 2021-06-15 PROCEDURE — 99213 OFFICE O/P EST LOW 20 MIN: CPT | Performed by: OTOLARYNGOLOGY

## 2021-06-15 PROCEDURE — 3074F SYST BP LT 130 MM HG: CPT | Performed by: OTOLARYNGOLOGY

## 2021-06-15 RX ORDER — CLONAZEPAM 0.5 MG/1
TABLET ORAL
Qty: 30 TABLET | Refills: 0 | Status: SHIPPED | OUTPATIENT
Start: 2021-06-15 | End: 2021-08-04

## 2021-06-15 RX ORDER — CYCLOBENZAPRINE HCL 5 MG
5 TABLET ORAL NIGHTLY
Qty: 30 TABLET | Refills: 1 | Status: SHIPPED | OUTPATIENT
Start: 2021-06-15 | End: 2021-08-23

## 2021-06-15 RX ORDER — MELOXICAM 15 MG/1
15 TABLET ORAL DAILY
Qty: 30 TABLET | Refills: 3 | Status: SHIPPED | OUTPATIENT
Start: 2021-06-15 | End: 2021-08-23

## 2021-06-15 NOTE — TELEPHONE ENCOUNTER
This refill request is being sent to the provider for the following reason:[][][x]Patient did not complete follow up recommendations last visit on 8/6/20

## 2021-06-16 ENCOUNTER — MED REC SCAN ONLY (OUTPATIENT)
Dept: INTERNAL MEDICINE CLINIC | Facility: CLINIC | Age: 56
End: 2021-06-16

## 2021-06-16 NOTE — PROGRESS NOTES
Chang Pederson is a 54year old female.   Patient presents with:  Refill Request: clonazepam  Ear Problem: pulling sensation of right ear, sensation comes and goes for about 1.5 month       HISTORY OF PRESENT ILLNESS    She presents with complaints of sno been dealing with a lot of stress anxiety and has increased her use of clonazepam to 0.5 mg p.o. twice daily as needed.  Generally she uses 1 tablet in fact will go days without using a tablet at all. Shira Leonardo does note that at times she has fasciculation of h Number of children: Not on file      Years of education: Not on file      Highest education level: Not on file    Occupational History      Occupation: LPN    Tobacco Use      Smoking status: Current Some Day Smoker        Packs/day: 0.10        Years: 20. REVIEW OF SYSTEMS    System Neg/Pos Details   Constitutional Negative Fatigue, fever and weight loss. ENMT Negative Drooling. Eyes Negative Blurred vision and vision changes. Respiratory Negative Dyspnea and wheezing.    Cardio Negative Chest Normal.   Nose/Mouth/Throat Normal Nares - Right: Normal Left: Normal. Septum -Normal  Turbinates - Right: Normal, Left: Normal.       Current Outpatient Medications:   •  cyclobenzaprine 5 MG Oral Tab, Take 1 tablet (5 mg total) by mouth nightly., Disp: 3

## 2021-07-25 DIAGNOSIS — Z87.898 HX OF DIZZINESS: ICD-10-CM

## 2021-07-30 NOTE — TELEPHONE ENCOUNTER
Dr Debbie Slamon please see note below and advise ,pt last visit on 6/15/21 dizziness request for refill.

## 2021-07-30 NOTE — TELEPHONE ENCOUNTER
Per pt upset refill has not been approved, [facility] pharmacy sent request on 7/25. Please advise thank you.

## 2021-08-04 RX ORDER — CLONAZEPAM 0.5 MG/1
TABLET ORAL
Qty: 30 TABLET | Refills: 0 | Status: SHIPPED | OUTPATIENT
Start: 2021-08-04 | End: 2021-09-22

## 2021-08-20 ENCOUNTER — LAB ENCOUNTER (OUTPATIENT)
Dept: LAB | Age: 56
End: 2021-08-20
Attending: INTERNAL MEDICINE
Payer: MEDICAID

## 2021-08-20 DIAGNOSIS — R79.89 ELEVATED LIVER FUNCTION TESTS: ICD-10-CM

## 2021-08-20 LAB
ALBUMIN SERPL-MCNC: 3.9 G/DL (ref 3.4–5)
ALP LIVER SERPL-CCNC: 81 U/L
ALT SERPL-CCNC: 141 U/L
AST SERPL-CCNC: 68 U/L (ref 15–37)
BASOPHILS # BLD AUTO: 0.03 X10(3) UL (ref 0–0.2)
BASOPHILS NFR BLD AUTO: 0.5 %
BILIRUB DIRECT SERPL-MCNC: <0.1 MG/DL (ref 0–0.2)
BILIRUB SERPL-MCNC: 0.3 MG/DL (ref 0.1–2)
DEPRECATED RDW RBC AUTO: 41.1 FL (ref 35.1–46.3)
EOSINOPHIL # BLD AUTO: 0.13 X10(3) UL (ref 0–0.7)
EOSINOPHIL NFR BLD AUTO: 2.3 %
ERYTHROCYTE [DISTWIDTH] IN BLOOD BY AUTOMATED COUNT: 12.7 % (ref 11–15)
HCT VFR BLD AUTO: 40.6 %
HGB BLD-MCNC: 13.3 G/DL
IMM GRANULOCYTES # BLD AUTO: 0.01 X10(3) UL (ref 0–1)
IMM GRANULOCYTES NFR BLD: 0.2 %
INR BLD: 1.06 (ref 0.9–1.2)
LYMPHOCYTES # BLD AUTO: 1.83 X10(3) UL (ref 1–4)
LYMPHOCYTES NFR BLD AUTO: 32.7 %
M PROTEIN MFR SERPL ELPH: 7.7 G/DL (ref 6.4–8.2)
MCH RBC QN AUTO: 29 PG (ref 26–34)
MCHC RBC AUTO-ENTMCNC: 32.8 G/DL (ref 31–37)
MCV RBC AUTO: 88.5 FL
MONOCYTES # BLD AUTO: 0.26 X10(3) UL (ref 0.1–1)
MONOCYTES NFR BLD AUTO: 4.7 %
NEUTROPHILS # BLD AUTO: 3.33 X10 (3) UL (ref 1.5–7.7)
NEUTROPHILS # BLD AUTO: 3.33 X10(3) UL (ref 1.5–7.7)
NEUTROPHILS NFR BLD AUTO: 59.6 %
PLATELET # BLD AUTO: 242 10(3)UL (ref 150–450)
PROTHROMBIN TIME: 13.6 SECONDS (ref 11.8–14.5)
RBC # BLD AUTO: 4.59 X10(6)UL
WBC # BLD AUTO: 5.6 X10(3) UL (ref 4–11)

## 2021-08-20 PROCEDURE — 80076 HEPATIC FUNCTION PANEL: CPT

## 2021-08-20 PROCEDURE — 36415 COLL VENOUS BLD VENIPUNCTURE: CPT

## 2021-08-20 PROCEDURE — 85025 COMPLETE CBC W/AUTO DIFF WBC: CPT

## 2021-08-20 PROCEDURE — 85610 PROTHROMBIN TIME: CPT

## 2021-08-23 ENCOUNTER — OFFICE VISIT (OUTPATIENT)
Dept: INTERNAL MEDICINE CLINIC | Facility: CLINIC | Age: 56
End: 2021-08-23
Payer: MEDICAID

## 2021-08-23 ENCOUNTER — OFFICE VISIT (OUTPATIENT)
Dept: SURGERY | Facility: CLINIC | Age: 56
End: 2021-08-23
Payer: MEDICAID

## 2021-08-23 VITALS
HEART RATE: 93 BPM | WEIGHT: 159.81 LBS | SYSTOLIC BLOOD PRESSURE: 107 MMHG | HEIGHT: 63 IN | BODY MASS INDEX: 28.32 KG/M2 | DIASTOLIC BLOOD PRESSURE: 71 MMHG | RESPIRATION RATE: 16 BRPM | OXYGEN SATURATION: 96 %

## 2021-08-23 VITALS
BODY MASS INDEX: 28.32 KG/M2 | HEIGHT: 63 IN | SYSTOLIC BLOOD PRESSURE: 97 MMHG | HEART RATE: 82 BPM | DIASTOLIC BLOOD PRESSURE: 64 MMHG | TEMPERATURE: 98 F | WEIGHT: 159.81 LBS

## 2021-08-23 DIAGNOSIS — E55.9 VITAMIN D DEFICIENCY: ICD-10-CM

## 2021-08-23 DIAGNOSIS — Z12.31 ENCOUNTER FOR SCREENING MAMMOGRAM FOR BREAST CANCER: ICD-10-CM

## 2021-08-23 DIAGNOSIS — Z01.419 ENCOUNTER FOR ANNUAL ROUTINE GYNECOLOGICAL EXAMINATION: ICD-10-CM

## 2021-08-23 DIAGNOSIS — H40.9 GLAUCOMA, UNSPECIFIED GLAUCOMA TYPE, UNSPECIFIED LATERALITY: ICD-10-CM

## 2021-08-23 DIAGNOSIS — B00.2 ORAL HERPES: ICD-10-CM

## 2021-08-23 DIAGNOSIS — Z13.6 SCREENING, ISCHEMIC HEART DISEASE: ICD-10-CM

## 2021-08-23 DIAGNOSIS — Z00.00 ANNUAL PHYSICAL EXAM: Primary | ICD-10-CM

## 2021-08-23 PROCEDURE — 3078F DIAST BP <80 MM HG: CPT | Performed by: INTERNAL MEDICINE

## 2021-08-23 PROCEDURE — 3074F SYST BP LT 130 MM HG: CPT | Performed by: INTERNAL MEDICINE

## 2021-08-23 PROCEDURE — 99396 PREV VISIT EST AGE 40-64: CPT | Performed by: INTERNAL MEDICINE

## 2021-08-23 PROCEDURE — 3008F BODY MASS INDEX DOCD: CPT | Performed by: INTERNAL MEDICINE

## 2021-08-23 RX ORDER — VALACYCLOVIR HYDROCHLORIDE 1 G/1
2 TABLET, FILM COATED ORAL 2 TIMES DAILY
Qty: 4 TABLET | Refills: 5 | Status: SHIPPED | OUTPATIENT
Start: 2021-08-23 | End: 2021-08-24

## 2021-08-23 NOTE — PROGRESS NOTES
[facility] at [facility]  [address], [address]  [address]  [phone] ([phone]) CVA   • Hypertension Mother    • Diabetes Sister         type 2 - half (P)   • Colon Cancer Neg         close relative   • Glaucoma Neg    • Macular degeneration Neg       Social History    Tobacco Use      Smoking status: Current Some Day Smoker        Pa certainly there is some underlying NAFLD but LFT are now much higher than they were in [id number] when her weight was similar.  This increase in LFT despite no significant increase in weight would be unusual for just NAFLD and could suggest additional contri

## 2021-08-23 NOTE — PROGRESS NOTES
cHistory of Present Illness   Patient ID: Nerissa Skiff is a 64year old female. Chief Complaint: Physical      Nerissa Skiff is a pleasant 64year old female who presents for annual physical exam. Nerissa Skiff is doing well today.   Dr Tai Mark not General: Abdomen is flat. Bowel sounds are normal.      Tenderness: There is no abdominal tenderness. Musculoskeletal:         General: Normal range of motion. Cervical back: Normal range of motion and neck supple.    Skin:     Coloration: Skin is no Pressure: 97 mmHg      Is BP treated: No      HDL Cholesterol: 50 mg/dL      Total Cholesterol: 202 mg/dL    Medical History    Reviewed allergies:  No Known Allergies     Reviewed:  Patient Active Problem List    Polyp of colon      Fasciculance Packs/day: 0.10        Years: 20.00        Pack years: 2        Types: Cigarettes        Quit date:         Years since quittin.6      Smokeless tobacco: Never Used    Vaping Use      Vaping Use: Never used    Alcohol use: Yes      Alcohol/week: 1. exercise.         Follow Up:   Return for DEPENDING ON LAB/IMAGE RESULTS, 1 Pilar Nava MD  Internal Medicine    Medications This Visit:  Requested Prescriptions     Signed Prescriptions Disp Refills   • valACYclovir 1 G Oral Tab

## 2021-08-24 ENCOUNTER — MED REC SCAN ONLY (OUTPATIENT)
Dept: INTERNAL MEDICINE CLINIC | Facility: CLINIC | Age: 56
End: 2021-08-24

## 2021-08-31 DIAGNOSIS — Z87.898 HX OF DIZZINESS: ICD-10-CM

## 2021-08-31 RX ORDER — CLONAZEPAM 0.5 MG/1
TABLET ORAL
Qty: 30 TABLET | Refills: 0 | OUTPATIENT
Start: 2021-08-31

## 2021-08-31 NOTE — TELEPHONE ENCOUNTER
This refill request is being sent to the provider for the following reason:    [x]Medication is not within protocol

## 2021-09-15 ENCOUNTER — LAB ENCOUNTER (OUTPATIENT)
Dept: LAB | Age: 56
End: 2021-09-15
Attending: RADIOLOGY
Payer: MEDICAID

## 2021-09-15 VITALS — WEIGHT: 159 LBS | HEIGHT: 63 IN | BODY MASS INDEX: 28.17 KG/M2

## 2021-09-15 DIAGNOSIS — Z01.818 PRE-OP TESTING: ICD-10-CM

## 2021-09-15 DIAGNOSIS — Z00.00 ANNUAL PHYSICAL EXAM: ICD-10-CM

## 2021-09-15 DIAGNOSIS — E55.9 VITAMIN D DEFICIENCY: ICD-10-CM

## 2021-09-15 LAB
ALBUMIN SERPL-MCNC: 4.1 G/DL (ref 3.4–5)
ALBUMIN/GLOB SERPL: 1 {RATIO} (ref 1–2)
ALP LIVER SERPL-CCNC: 91 U/L
ALT SERPL-CCNC: 135 U/L
ANION GAP SERPL CALC-SCNC: 5 MMOL/L (ref 0–18)
AST SERPL-CCNC: 79 U/L (ref 15–37)
BILIRUB SERPL-MCNC: 0.6 MG/DL (ref 0.1–2)
BUN BLD-MCNC: 11 MG/DL (ref 7–18)
BUN/CREAT SERPL: 17.2 (ref 10–20)
CALCIUM BLD-MCNC: 9.6 MG/DL (ref 8.5–10.1)
CHLORIDE SERPL-SCNC: 108 MMOL/L (ref 98–112)
CHOLEST SERPL-MCNC: 208 MG/DL (ref ?–200)
CO2 SERPL-SCNC: 25 MMOL/L (ref 21–32)
CREAT BLD-MCNC: 0.64 MG/DL
DEPRECATED RDW RBC AUTO: 41.2 FL (ref 35.1–46.3)
ERYTHROCYTE [DISTWIDTH] IN BLOOD BY AUTOMATED COUNT: 12.5 % (ref 11–15)
EST. AVERAGE GLUCOSE BLD GHB EST-MCNC: 108 MG/DL (ref 68–126)
GLOBULIN PLAS-MCNC: 4.2 G/DL (ref 2.8–4.4)
GLUCOSE BLD-MCNC: 83 MG/DL (ref 70–99)
HBA1C MFR BLD HPLC: 5.4 % (ref ?–5.7)
HCT VFR BLD AUTO: 43.7 %
HDLC SERPL-MCNC: 50 MG/DL (ref 40–59)
HGB BLD-MCNC: 14 G/DL
LDLC SERPL CALC-MCNC: 135 MG/DL (ref ?–100)
MCH RBC QN AUTO: 28.7 PG (ref 26–34)
MCHC RBC AUTO-ENTMCNC: 32 G/DL (ref 31–37)
MCV RBC AUTO: 89.5 FL
NONHDLC SERPL-MCNC: 158 MG/DL (ref ?–130)
OSMOLALITY SERPL CALC.SUM OF ELEC: 285 MOSM/KG (ref 275–295)
PATIENT FASTING Y/N/NP: YES
PATIENT FASTING Y/N/NP: YES
PLATELET # BLD AUTO: 239 10(3)UL (ref 150–450)
POTASSIUM SERPL-SCNC: 4 MMOL/L (ref 3.5–5.1)
PROT SERPL-MCNC: 8.3 G/DL (ref 6.4–8.2)
RBC # BLD AUTO: 4.88 X10(6)UL
SODIUM SERPL-SCNC: 138 MMOL/L (ref 136–145)
TRIGL SERPL-MCNC: 128 MG/DL (ref 30–149)
TSI SER-ACNC: 2.08 MIU/ML (ref 0.36–3.74)
VIT D+METAB SERPL-MCNC: 16.9 NG/ML (ref 30–100)
VLDLC SERPL CALC-MCNC: 23 MG/DL (ref 0–30)
WBC # BLD AUTO: 6.9 X10(3) UL (ref 4–11)

## 2021-09-15 PROCEDURE — 83036 HEMOGLOBIN GLYCOSYLATED A1C: CPT

## 2021-09-15 PROCEDURE — 85027 COMPLETE CBC AUTOMATED: CPT

## 2021-09-15 PROCEDURE — 80061 LIPID PANEL: CPT

## 2021-09-15 PROCEDURE — 80053 COMPREHEN METABOLIC PANEL: CPT

## 2021-09-15 PROCEDURE — 82306 VITAMIN D 25 HYDROXY: CPT

## 2021-09-15 PROCEDURE — 36415 COLL VENOUS BLD VENIPUNCTURE: CPT

## 2021-09-15 PROCEDURE — 84443 ASSAY THYROID STIM HORMONE: CPT

## 2021-09-15 RX ORDER — SODIUM CHLORIDE 9 MG/ML
INJECTION, SOLUTION INTRAVENOUS CONTINUOUS
Status: CANCELLED | OUTPATIENT
Start: 2021-09-15

## 2021-09-15 RX ORDER — OMEPRAZOLE 20 MG/1
20 CAPSULE, DELAYED RELEASE ORAL
COMMUNITY
End: 2021-09-23

## 2021-09-16 ENCOUNTER — HOSPITAL ENCOUNTER (EMERGENCY)
Facility: HOSPITAL | Age: 56
Discharge: HOME | End: 2021-09-16
Attending: EMERGENCY MEDICINE
Payer: MEDICAID

## 2021-09-16 ENCOUNTER — APPOINTMENT (OUTPATIENT)
Dept: CT IMAGING | Facility: HOSPITAL | Age: 56
End: 2021-09-16
Attending: EMERGENCY MEDICINE
Payer: MEDICAID

## 2021-09-16 VITALS
SYSTOLIC BLOOD PRESSURE: 117 MMHG | BODY MASS INDEX: 28.17 KG/M2 | RESPIRATION RATE: 16 BRPM | WEIGHT: 159 LBS | OXYGEN SATURATION: 99 % | HEIGHT: 63 IN | DIASTOLIC BLOOD PRESSURE: 68 MMHG | TEMPERATURE: 99 F | HEART RATE: 90 BPM

## 2021-09-16 DIAGNOSIS — K57.92 ACUTE DIVERTICULITIS: Primary | ICD-10-CM

## 2021-09-16 LAB
ANION GAP SERPL CALC-SCNC: 9 MMOL/L (ref 0–18)
BASOPHILS # BLD AUTO: 0.03 X10(3) UL (ref 0–0.2)
BASOPHILS NFR BLD AUTO: 0.3 %
BILIRUB UR QL: NEGATIVE
BUN BLD-MCNC: 16 MG/DL (ref 7–18)
BUN/CREAT SERPL: 22.9 (ref 10–20)
CALCIUM BLD-MCNC: 9.1 MG/DL (ref 8.5–10.1)
CHLORIDE SERPL-SCNC: 105 MMOL/L (ref 98–112)
CO2 SERPL-SCNC: 25 MMOL/L (ref 21–32)
COLOR UR: YELLOW
CREAT BLD-MCNC: 0.7 MG/DL
DEPRECATED RDW RBC AUTO: 41.4 FL (ref 35.1–46.3)
EOSINOPHIL # BLD AUTO: 0.1 X10(3) UL (ref 0–0.7)
EOSINOPHIL NFR BLD AUTO: 1.1 %
ERYTHROCYTE [DISTWIDTH] IN BLOOD BY AUTOMATED COUNT: 12.8 % (ref 11–15)
GLUCOSE BLD-MCNC: 113 MG/DL (ref 70–99)
GLUCOSE UR-MCNC: NEGATIVE MG/DL
HCT VFR BLD AUTO: 40.6 %
HGB BLD-MCNC: 13.3 G/DL
IMM GRANULOCYTES # BLD AUTO: 0.02 X10(3) UL (ref 0–1)
IMM GRANULOCYTES NFR BLD: 0.2 %
KETONES UR-MCNC: NEGATIVE MG/DL
LYMPHOCYTES # BLD AUTO: 1.41 X10(3) UL (ref 1–4)
LYMPHOCYTES NFR BLD AUTO: 16.2 %
MCH RBC QN AUTO: 28.9 PG (ref 26–34)
MCHC RBC AUTO-ENTMCNC: 32.8 G/DL (ref 31–37)
MCV RBC AUTO: 88.3 FL
MONOCYTES # BLD AUTO: 0.83 X10(3) UL (ref 0.1–1)
MONOCYTES NFR BLD AUTO: 9.5 %
NEUTROPHILS # BLD AUTO: 6.33 X10 (3) UL (ref 1.5–7.7)
NEUTROPHILS # BLD AUTO: 6.33 X10(3) UL (ref 1.5–7.7)
NEUTROPHILS NFR BLD AUTO: 72.7 %
NITRITE UR QL STRIP.AUTO: NEGATIVE
OSMOLALITY SERPL CALC.SUM OF ELEC: 290 MOSM/KG (ref 275–295)
PH UR: 6 [PH] (ref 5–8)
PLATELET # BLD AUTO: 223 10(3)UL (ref 150–450)
POTASSIUM SERPL-SCNC: 3.9 MMOL/L (ref 3.5–5.1)
PROT UR-MCNC: NEGATIVE MG/DL
RBC # BLD AUTO: 4.6 X10(6)UL
SARS-COV-2 RNA RESP QL NAA+PROBE: NOT DETECTED
SODIUM SERPL-SCNC: 139 MMOL/L (ref 136–145)
SP GR UR STRIP: 1.01 (ref 1–1.03)
UROBILINOGEN UR STRIP-ACNC: <2
WBC # BLD AUTO: 8.7 X10(3) UL (ref 4–11)

## 2021-09-16 PROCEDURE — 99284 EMERGENCY DEPT VISIT MOD MDM: CPT

## 2021-09-16 PROCEDURE — 80048 BASIC METABOLIC PNL TOTAL CA: CPT | Performed by: EMERGENCY MEDICINE

## 2021-09-16 PROCEDURE — 96374 THER/PROPH/DIAG INJ IV PUSH: CPT

## 2021-09-16 PROCEDURE — 74177 CT ABD & PELVIS W/CONTRAST: CPT | Performed by: EMERGENCY MEDICINE

## 2021-09-16 PROCEDURE — 85025 COMPLETE CBC W/AUTO DIFF WBC: CPT | Performed by: EMERGENCY MEDICINE

## 2021-09-16 PROCEDURE — 81001 URINALYSIS AUTO W/SCOPE: CPT | Performed by: EMERGENCY MEDICINE

## 2021-09-16 RX ORDER — AMOXICILLIN AND CLAVULANATE POTASSIUM 875; 125 MG/1; MG/1
875 TABLET, FILM COATED ORAL ONCE
Status: COMPLETED | OUTPATIENT
Start: 2021-09-16 | End: 2021-09-16

## 2021-09-16 RX ORDER — KETOROLAC TROMETHAMINE 15 MG/ML
15 INJECTION, SOLUTION INTRAMUSCULAR; INTRAVENOUS ONCE
Status: COMPLETED | OUTPATIENT
Start: 2021-09-16 | End: 2021-09-16

## 2021-09-16 RX ORDER — AMOXICILLIN AND CLAVULANATE POTASSIUM 875; 125 MG/1; MG/1
1 TABLET, FILM COATED ORAL 2 TIMES DAILY
Qty: 20 TABLET | Refills: 0 | Status: SHIPPED | OUTPATIENT
Start: 2021-09-16 | End: 2021-09-26

## 2021-09-16 NOTE — ED PROVIDER NOTES
Patient Seen in: [facility] AND [facility] Emergency Department      History   Patient presents with:  Abdomen/Flank Pain    Stated Complaint: Abd pain, fever    Subjective:   HPI    64year old female with past medical history of pyelonephritis, abnormal LFTs, are as noted in HPI. Constitutional and vital signs reviewed. All other systems reviewed and negative except as noted above.     Physical Exam     ED Triage Vitals [09/16/21 1616]   /87   Pulse 106   Resp 18   Temp 99.6 °F (37.6 °C)   Temp src T (*)     Blood Urine Small (*)     Leukocyte Esterase Urine Trace (*)     Bacteria Urine 3+ (*)     Squamous Epi.  Cells Few (*)     All other components within normal limits   BASIC METABOLIC PANEL (8) - Abnormal; Notable for the following components:    Gl 9/16/21 1901)   amoxicillin clavulanate (AUGMENTIN) 875-125 MG tab 875 mg (875 mg Oral Given 9/16/21 2010)         Disposition and Plan     Clinical Impression:  Acute diverticulitis  (primary encounter diagnosis)     Disposition:  Discharge  9/16/2021  8:

## 2021-09-17 ENCOUNTER — HOSPITAL ENCOUNTER (OUTPATIENT)
Dept: INTERVENTIONAL RADIOLOGY/VASCULAR | Facility: HOSPITAL | Age: 56
Discharge: HOME | End: 2021-09-17
Attending: INTERNAL MEDICINE
Payer: MEDICAID

## 2021-09-17 DIAGNOSIS — Z01.818 PRE-OP TESTING: Primary | ICD-10-CM

## 2021-09-21 ENCOUNTER — TELEPHONE (OUTPATIENT)
Dept: GASTROENTEROLOGY | Facility: CLINIC | Age: 56
End: 2021-09-21

## 2021-09-21 ENCOUNTER — TELEPHONE (OUTPATIENT)
Dept: OTOLARYNGOLOGY | Facility: CLINIC | Age: 56
End: 2021-09-21

## 2021-09-21 DIAGNOSIS — Z87.898 HX OF DIZZINESS: ICD-10-CM

## 2021-09-21 DIAGNOSIS — K21.00 GASTROESOPHAGEAL REFLUX DISEASE WITH ESOPHAGITIS: ICD-10-CM

## 2021-09-21 NOTE — TELEPHONE ENCOUNTER
Please verify her dosing, last note from the hepatologist Dr. Teofilo Goldmann shows she is down to omeprazole 20 mg?

## 2021-09-21 NOTE — TELEPHONE ENCOUNTER
Pt states that she was in ER on 9/16/21 due to diverticulitis. Pt is still having pain on left side and is nauseous and has chills. Pt is currently taking antibiotics. Call transferred to RN.

## 2021-09-21 NOTE — TELEPHONE ENCOUNTER
Spoke with patient (name and  verified), informed of message below. States \"it's what ever is in the chart\". Informed patient that we are not guessing at her dosage and its not best practice. Patient advised to contact office when she gets home, to verify the dosage of her Omeprazole. Patient verbalized understanding and agrees with plan of care.

## 2021-09-21 NOTE — TELEPHONE ENCOUNTER
Call transferred from phone room. Patient requesting sooner appointment for ER follow up, states  only gave her appointments for next year.      I offered sooner appt at [facility] which patient initially declined but then confirmed for 11AM.    Y

## 2021-09-21 NOTE — TELEPHONE ENCOUNTER
Medication pended. Reported as not taking 9/15/21. Refill passed per [address] protocol.   Requested Prescriptions   Pending Prescriptions Disp Refills    OMEPRAZOLE 40 MG Oral Capsule Delayed Release [Pharmacy Med Name: Omeprazole 40 MG Oral Capsule Delayed Release] 30 capsule 0     Sig: Take 1 capsule by mouth once daily        Gastrointestional Medication Protocol Passed - 9/21/2021  3:01 PM        Passed - Appointment in past 12 or next 3 months               Recent Outpatient Visits              4 weeks ago Annual physical exam    [facility], Alexandra Martinez MD    Office Visit    4 weeks ago     [address] [facility], Yady Andrews MD    Office Visit    3 months ago Right ear pain    [facility], [address], [facility], Kenyatta Wade MD    Office Visit    3 months ago Elevated liver function tests    [facility] Aldo Sun MD    Office Visit    7 months ago Abnormal LFTs    [address] [facility], [address] [facility], Scar Kwon MD    Office Visit            Future Appointments         Provider Department Appt Notes    In 6 days MD [facility]     In 1 week [facility], [address] [facility], [address] added per Precious Sidhu   .R/S per Precious Sidhu    In 2 months Adelaida Loyd MD [facility], [address], [place] 6 Month Follow Up - Ear

## 2021-09-22 RX ORDER — CLONAZEPAM 0.5 MG/1
TABLET ORAL
Qty: 30 TABLET | Refills: 0 | Status: SHIPPED | OUTPATIENT
Start: 2021-09-22

## 2021-09-23 ENCOUNTER — OFFICE VISIT (OUTPATIENT)
Dept: GASTROENTEROLOGY | Facility: CLINIC | Age: 56
End: 2021-09-23
Payer: MEDICAID

## 2021-09-23 VITALS
DIASTOLIC BLOOD PRESSURE: 73 MMHG | HEIGHT: 63 IN | HEART RATE: 81 BPM | BODY MASS INDEX: 28.35 KG/M2 | WEIGHT: 160 LBS | SYSTOLIC BLOOD PRESSURE: 106 MMHG

## 2021-09-23 DIAGNOSIS — K21.00 GASTROESOPHAGEAL REFLUX DISEASE WITH ESOPHAGITIS WITHOUT HEMORRHAGE: Primary | ICD-10-CM

## 2021-09-23 DIAGNOSIS — K57.32 DIVERTICULITIS OF LARGE INTESTINE WITHOUT PERFORATION OR ABSCESS WITHOUT BLEEDING: ICD-10-CM

## 2021-09-23 DIAGNOSIS — R79.89 ABNORMAL LFTS: ICD-10-CM

## 2021-09-23 PROCEDURE — 3074F SYST BP LT 130 MM HG: CPT | Performed by: INTERNAL MEDICINE

## 2021-09-23 PROCEDURE — 3008F BODY MASS INDEX DOCD: CPT | Performed by: INTERNAL MEDICINE

## 2021-09-23 PROCEDURE — 99214 OFFICE O/P EST MOD 30 MIN: CPT | Performed by: INTERNAL MEDICINE

## 2021-09-23 PROCEDURE — 3078F DIAST BP <80 MM HG: CPT | Performed by: INTERNAL MEDICINE

## 2021-09-23 RX ORDER — OMEPRAZOLE 20 MG/1
20 CAPSULE, DELAYED RELEASE ORAL
Qty: 90 CAPSULE | Refills: 3 | Status: SHIPPED | OUTPATIENT
Start: 2021-09-23 | End: 2022-09-18

## 2021-09-23 NOTE — PATIENT INSTRUCTIONS
1. Soft foods while on abx for diverticulitis  2. After antibiotics, start high fiber--->including metamucil three times a week  3. Reschedule liver biopsy  4. F/u with Dr. Elsa Bay after the biopsy  5. Yogurt 3x a week    6.  Avoid NSAIDS like iburpofen or al

## 2021-09-23 NOTE — PROGRESS NOTES
[facility] Follow-up Visit    Estrellita Last 6 Encounters:  09/23/21 : 160 lb (72.6 kg)  09/15/21 : 159 lb (72.1 kg)  09/16/21 : 159 lb (72.1 kg)  08/23/21 : 159 lb 12.8 oz (72.5 kg)  08/23/21 : 159 lb 12.8 oz (72.5 kg)  06/15/21 : 160 lb (72.6 kg)       History, Medications, Allergies, ROS: total) by mouth every morning before breakfast. 90 capsule 3   • clonazePAM 0.5 MG Oral Tab Take 1 tablet by mouth once daily 30 tablet 0   • amoxicillin clavulanate 875-125 MG Oral Tab Take 1 tablet by mouth 2 (two) times daily for 10 days.  20 tablet 0 to reschedule her liver biopsy but will go for it soon to exclude AIH.     #Screening -Last in 2021, repeat CLN in 5 years due to polyps    #LLQ - Diverticulitis noted on recent imaging, uncomplicated, plan to finish augmentin.  Additionally recommendations

## 2021-10-02 RX ORDER — OMEPRAZOLE 40 MG/1
40 CAPSULE, DELAYED RELEASE ORAL DAILY
Qty: 30 CAPSULE | Refills: 0 | OUTPATIENT
Start: 2021-10-02

## 2021-10-02 NOTE — TELEPHONE ENCOUNTER
Medication was changed by Dr. Inga Licea.     Authorizing Provider Encounter Provider Ordering Provider   MD Aure Mccollum MD Orval Azure, MD       Medication Detail    Medication Quantity Refills Start End   omeprazole 20 MG Oral Capsule Delayed Release 90 capsule 3 9/23/2021 9/18/2022

## 2021-10-06 ENCOUNTER — LAB ENCOUNTER (OUTPATIENT)
Dept: LAB | Facility: HOSPITAL | Age: 56
End: 2021-10-06
Attending: INTERNAL MEDICINE
Payer: MEDICAID

## 2021-10-06 ENCOUNTER — OFFICE VISIT (OUTPATIENT)
Dept: OBGYN CLINIC | Facility: CLINIC | Age: 56
End: 2021-10-06
Payer: MEDICAID

## 2021-10-06 VITALS
BODY MASS INDEX: 28 KG/M2 | HEART RATE: 81 BPM | DIASTOLIC BLOOD PRESSURE: 70 MMHG | WEIGHT: 159.38 LBS | SYSTOLIC BLOOD PRESSURE: 102 MMHG

## 2021-10-06 DIAGNOSIS — Z01.818 PREOP TESTING: ICD-10-CM

## 2021-10-06 DIAGNOSIS — Z01.419 WELL WOMAN EXAM WITH ROUTINE GYNECOLOGICAL EXAM: Primary | ICD-10-CM

## 2021-10-06 DIAGNOSIS — Z12.31 ENCOUNTER FOR SCREENING MAMMOGRAM FOR MALIGNANT NEOPLASM OF BREAST: ICD-10-CM

## 2021-10-06 DIAGNOSIS — N89.8 VAGINAL ITCHING: ICD-10-CM

## 2021-10-06 PROCEDURE — 99396 PREV VISIT EST AGE 40-64: CPT | Performed by: CLINICAL NURSE SPECIALIST

## 2021-10-06 PROCEDURE — 3074F SYST BP LT 130 MM HG: CPT | Performed by: CLINICAL NURSE SPECIALIST

## 2021-10-06 PROCEDURE — 3078F DIAST BP <80 MM HG: CPT | Performed by: CLINICAL NURSE SPECIALIST

## 2021-10-06 RX ORDER — CLONAZEPAM 0.5 MG/1
0.5 TABLET ORAL NIGHTLY PRN
Qty: 30 TABLET | Refills: 1 | Status: SHIPPED | OUTPATIENT
Start: 2021-10-06 | End: 2021-12-16

## 2021-10-08 ENCOUNTER — HOSPITAL ENCOUNTER (OUTPATIENT)
Dept: INTERVENTIONAL RADIOLOGY/VASCULAR | Facility: HOSPITAL | Age: 56
Discharge: HOME | End: 2021-10-08
Attending: RADIOLOGY | Admitting: RADIOLOGY
Payer: MEDICAID

## 2021-10-08 VITALS
OXYGEN SATURATION: 99 % | WEIGHT: 160 LBS | DIASTOLIC BLOOD PRESSURE: 66 MMHG | SYSTOLIC BLOOD PRESSURE: 84 MMHG | TEMPERATURE: 98 F | RESPIRATION RATE: 14 BRPM | HEART RATE: 62 BPM | BODY MASS INDEX: 28 KG/M2

## 2021-10-08 DIAGNOSIS — R79.89 ELEVATED LIVER FUNCTION TESTS: ICD-10-CM

## 2021-10-08 DIAGNOSIS — Z01.818 PREOP TESTING: Primary | ICD-10-CM

## 2021-10-08 PROCEDURE — BF45ZZZ ULTRASONOGRAPHY OF LIVER: ICD-10-PCS | Performed by: RADIOLOGY

## 2021-10-08 PROCEDURE — 36415 COLL VENOUS BLD VENIPUNCTURE: CPT

## 2021-10-08 PROCEDURE — 88307 TISSUE EXAM BY PATHOLOGIST: CPT | Performed by: INTERNAL MEDICINE

## 2021-10-08 PROCEDURE — 76942 ECHO GUIDE FOR BIOPSY: CPT

## 2021-10-08 PROCEDURE — 88313 SPECIAL STAINS GROUP 2: CPT | Performed by: INTERNAL MEDICINE

## 2021-10-08 PROCEDURE — 99152 MOD SED SAME PHYS/QHP 5/>YRS: CPT

## 2021-10-08 PROCEDURE — 0FB23ZX EXCISION OF LEFT LOBE LIVER, PERCUTANEOUS APPROACH, DIAGNOSTIC: ICD-10-PCS | Performed by: RADIOLOGY

## 2021-10-08 PROCEDURE — 47000 NEEDLE BIOPSY OF LIVER PERQ: CPT

## 2021-10-08 RX ORDER — MIDAZOLAM HYDROCHLORIDE 1 MG/ML
INJECTION INTRAMUSCULAR; INTRAVENOUS
Status: COMPLETED
Start: 2021-10-08 | End: 2021-10-08

## 2021-10-08 RX ORDER — SODIUM CHLORIDE 9 MG/ML
INJECTION, SOLUTION INTRAVENOUS CONTINUOUS
Status: DISCONTINUED | OUTPATIENT
Start: 2021-10-08 | End: 2021-10-08

## 2021-10-08 RX ORDER — MIDAZOLAM HYDROCHLORIDE 1 MG/ML
INJECTION INTRAMUSCULAR; INTRAVENOUS
Status: DISCONTINUED
Start: 2021-10-08 | End: 2021-10-08

## 2021-10-08 RX ORDER — ACETAMINOPHEN 500 MG
TABLET ORAL
Status: DISCONTINUED
Start: 2021-10-08 | End: 2021-10-08

## 2021-10-08 NOTE — PROGRESS NOTES
Nerissa Skiff is a 64year old female [id number] No LMP recorded. (Menstrual status: Menopause). Patient presents with:  Gyn Exam: ANNUAL,MAMMO - NEW PT   Gyn Problem: VAGINAL ITCH , NO ODOR NO DISCHARGE   Last annual exam and pap was 2018.  Pap was normal, Occupational History      Occupation: LPN    Tobacco Use      Smoking status: Former Smoker        Packs/day: 0.10        Years: 20.00        Pack years: 2        Types: Cigarettes        Quit date:         Years since quittin.7      Smokeless toba file  Social Connections:       Frequency of Communication with Friends and Family: Not on file      Frequency of Social Gatherings with Friends and Family: Not on file      Attends Restorationism Services: Not on file      Active Member of Clubs or [employer] palpitations  Respiratory:  denies shortness of breath  Gastrointestinal:  denies heartburn, abdominal pain, diarrhea or constipation  Genitourinary:  denies dysuria, incontinence, abnormal vaginal discharge, + vaginal itching  Musculoskeletal:  denies josue of breast  -     NITO DOTTIE 2D+3D SCREENING BILAT (CPT=77067/02906); Future    Vaginal itching  -     GENITAL VAGINOSIS SCREEN; Future  -     GENITAL VAGINOSIS SCREEN      Pap with HPV due 2023. ASSCP guidelines reviewed in detail. Annual exams encouraged.

## 2021-10-08 NOTE — IVS NOTE
Patient in IR for liver biopsy completed in holding rm 8. Timeout/universal protocol completed. ml of 2% lidocaine given by Kaila Barron MD to right upper quadrant.   Patient monitored and 2L O2 given via NC, VSS during procedure, 2 passes taken under Skylar

## 2021-10-12 NOTE — INTERVAL H&P NOTE
The above referenced H&P was reviewed by Opal Sandhu MD on 10/12/2021, the patient was examined and no significant changes have occurred in the patient's condition since the H&P was performed.   Risks, benefits, alternative treatments and consequences of n

## 2021-10-21 ENCOUNTER — OFFICE VISIT (OUTPATIENT)
Dept: OPHTHALMOLOGY | Facility: CLINIC | Age: 56
End: 2021-10-21
Payer: MEDICAID

## 2021-10-21 DIAGNOSIS — H40.003 GLAUCOMA SUSPECT OF BOTH EYES: Primary | ICD-10-CM

## 2021-10-21 DIAGNOSIS — H25.13 AGE-RELATED NUCLEAR CATARACT OF BOTH EYES: ICD-10-CM

## 2021-10-21 PROCEDURE — 92014 COMPRE OPH EXAM EST PT 1/>: CPT | Performed by: OPHTHALMOLOGY

## 2021-10-21 PROCEDURE — 92250 FUNDUS PHOTOGRAPHY W/I&R: CPT | Performed by: OPHTHALMOLOGY

## 2021-10-21 NOTE — ASSESSMENT & PLAN NOTE
Discussed mild cataracts in both eyes that are not affecting vision and are not surgical at this time. Suggest +2.50  over the counter glasses for reading.

## 2021-10-21 NOTE — PROGRESS NOTES
Kaleigh Pack is a 64year old female. HPI:     HPI     Consult      Additional comments: Consult per Dr. Alonso Davila              Comments     Patient is here for a complete exam and photos.   She complains of occasional  blurry vision at distance and near week      Comment: socially    Drug use: No      Medications:  Current Outpatient Medications   Medication Sig Dispense Refill   • clonazePAM 0.5 MG Oral Tab Take 1 tablet (0.5 mg total) by mouth nightly as needed for Anxiety.  30 tablet 1   • omeprazole 20 Chamber Deep and quiet Deep and quiet    Iris No transillumination defects No transillumination defects    Lens Trace Nuclear sclerosis Trace Nuclear sclerosis    Vitreous Clear Clear          Fundus Exam       Right Left    Disc Good rim, Temporal crescen

## 2021-12-07 ENCOUNTER — TELEPHONE (OUTPATIENT)
Dept: OPHTHALMOLOGY | Facility: CLINIC | Age: 56
End: 2021-12-07

## 2021-12-09 ENCOUNTER — TELEPHONE (OUTPATIENT)
Dept: OPHTHALMOLOGY | Facility: CLINIC | Age: 56
End: 2021-12-09

## 2021-12-09 DIAGNOSIS — K21.00 GASTROESOPHAGEAL REFLUX DISEASE WITH ESOPHAGITIS: ICD-10-CM

## 2021-12-09 NOTE — TELEPHONE ENCOUNTER
I called the patient. She says she has been sick and is pending results of a COVID test, so she cannot come in at this time. I RS her to 12/23/21.

## 2021-12-10 RX ORDER — OMEPRAZOLE 40 MG/1
CAPSULE, DELAYED RELEASE ORAL
Qty: 30 CAPSULE | Refills: 0 | OUTPATIENT
Start: 2021-12-10

## 2021-12-16 ENCOUNTER — OFFICE VISIT (OUTPATIENT)
Dept: OTOLARYNGOLOGY | Facility: CLINIC | Age: 56
End: 2021-12-16
Payer: MEDICAID

## 2021-12-16 VITALS — HEIGHT: 63 IN | BODY MASS INDEX: 28.35 KG/M2 | WEIGHT: 160 LBS

## 2021-12-16 DIAGNOSIS — R25.3 FASCICULATION OF TONGUE: Primary | ICD-10-CM

## 2021-12-16 DIAGNOSIS — R42 DIZZINESS: ICD-10-CM

## 2021-12-16 DIAGNOSIS — M54.2 NECK PAIN: ICD-10-CM

## 2021-12-16 PROCEDURE — 99214 OFFICE O/P EST MOD 30 MIN: CPT | Performed by: OTOLARYNGOLOGY

## 2021-12-16 PROCEDURE — 3008F BODY MASS INDEX DOCD: CPT | Performed by: OTOLARYNGOLOGY

## 2021-12-16 RX ORDER — CLONAZEPAM 0.5 MG/1
0.5 TABLET ORAL NIGHTLY PRN
Qty: 30 TABLET | Refills: 3 | Status: SHIPPED | OUTPATIENT
Start: 2021-12-16

## 2021-12-16 RX ORDER — CELECOXIB 200 MG/1
200 CAPSULE ORAL DAILY PRN
Qty: 30 CAPSULE | Refills: 0 | Status: SHIPPED | OUTPATIENT
Start: 2021-12-16 | End: 2022-01-15
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
## 2021-12-20 ENCOUNTER — TELEPHONE (OUTPATIENT)
Dept: OBGYN CLINIC | Facility: CLINIC | Age: 56
End: 2021-12-20

## 2021-12-20 DIAGNOSIS — G44.209 ACUTE NON INTRACTABLE TENSION-TYPE HEADACHE: Primary | ICD-10-CM

## 2021-12-21 ENCOUNTER — LAB ENCOUNTER (OUTPATIENT)
Dept: LAB | Facility: HOSPITAL | Age: 56
End: 2021-12-21
Attending: OBSTETRICS & GYNECOLOGY
Payer: MEDICAID

## 2021-12-21 DIAGNOSIS — G44.209 ACUTE NON INTRACTABLE TENSION-TYPE HEADACHE: ICD-10-CM

## 2021-12-22 ENCOUNTER — NURSE TRIAGE (OUTPATIENT)
Dept: INTERNAL MEDICINE CLINIC | Facility: CLINIC | Age: 56
End: 2021-12-22

## 2021-12-22 ENCOUNTER — TELEPHONE (OUTPATIENT)
Dept: OPHTHALMOLOGY | Facility: CLINIC | Age: 56
End: 2021-12-22

## 2021-12-22 ENCOUNTER — TELEMEDICINE (OUTPATIENT)
Dept: INTERNAL MEDICINE CLINIC | Facility: CLINIC | Age: 56
End: 2021-12-22
Payer: MEDICAID

## 2021-12-22 DIAGNOSIS — U07.1 COVID-19: Primary | ICD-10-CM

## 2021-12-22 PROCEDURE — 99213 OFFICE O/P EST LOW 20 MIN: CPT | Performed by: INTERNAL MEDICINE

## 2021-12-22 NOTE — TELEPHONE ENCOUNTER
Per pt testes positive for covid 12/21 and needs to reschedule her appointment for EP/ VF, OCT no MD

## 2021-12-22 NOTE — PROGRESS NOTES
This is a telemedicine visit with live, interactive video and audio. Patient understands and accepts financial responsibility for any deductible, co-insurance and/or co-pays associated with this service.     SUBJECTIVE  She scheduled video visit today d Smokeless tobacco: Never Used    Vaping Use      Vaping Use: Never used    Alcohol use:  Yes      Alcohol/week: 1.0 standard drink      Types: 1 Standard drinks or equivalent per week      Comment: socially    Drug use: No       No Known Allergies   Kaila

## 2021-12-22 NOTE — TELEPHONE ENCOUNTER
Spoke to pt and pt tested positive for COVID-19 on 12/21 and needed to cancel her appointment for tomorrow.  R/S pt to 1/18/22 @ 11:15 am.

## 2021-12-23 ENCOUNTER — TELEPHONE (OUTPATIENT)
Dept: CASE MANAGEMENT | Age: 56
End: 2021-12-23

## 2022-01-18 ENCOUNTER — TELEPHONE (OUTPATIENT)
Dept: OPHTHALMOLOGY | Facility: CLINIC | Age: 57
End: 2022-01-18

## 2022-01-18 ENCOUNTER — NURSE ONLY (OUTPATIENT)
Dept: OPHTHALMOLOGY | Facility: CLINIC | Age: 57
End: 2022-01-18
Payer: MEDICAID

## 2022-01-18 DIAGNOSIS — H40.003 GLAUCOMA SUSPECT OF BOTH EYES: ICD-10-CM

## 2022-01-18 PROCEDURE — 92133 CPTRZD OPH DX IMG PST SGM ON: CPT | Performed by: OPHTHALMOLOGY

## 2022-01-18 PROCEDURE — 92083 EXTENDED VISUAL FIELD XM: CPT | Performed by: OPHTHALMOLOGY

## 2022-01-18 NOTE — PROGRESS NOTES
Wade Aguilar is a 64year old female.     HPI:     HPI     Pt in today for a VF and OCT with no MD.     Last edited by Pastor Matilde OT on 1/18/2022 11:11 AM. (History)        Patient History:  Past Medical History:   Diagnosis Date   • Acute pyelon Capsule Delayed Release Take 1 capsule (20 mg total) by mouth every morning before breakfast. (Patient taking differently: Take 20 mg by mouth every morning before breakfast. Only as needed) 90 capsule 3   • clonazePAM 0.5 MG Oral Tab Take 1 tablet by mout

## 2022-01-26 ENCOUNTER — MOBILE ENCOUNTER (OUTPATIENT)
Dept: OBGYN CLINIC | Facility: CLINIC | Age: 57
End: 2022-01-26

## 2022-01-28 ENCOUNTER — TELEMEDICINE (OUTPATIENT)
Dept: INTERNAL MEDICINE CLINIC | Facility: CLINIC | Age: 57
End: 2022-01-28
Payer: MEDICAID

## 2022-01-28 DIAGNOSIS — M62.830 SPASM OF MUSCLE OF LOWER BACK: Primary | ICD-10-CM

## 2022-01-28 PROCEDURE — 99213 OFFICE O/P EST LOW 20 MIN: CPT | Performed by: INTERNAL MEDICINE

## 2022-01-28 RX ORDER — CYCLOBENZAPRINE HCL 5 MG
TABLET ORAL
Qty: 30 TABLET | Refills: 0 | Status: SHIPPED | OUTPATIENT
Start: 2022-01-28

## 2022-01-28 RX ORDER — METHYLPREDNISOLONE 4 MG/1
TABLET ORAL
Qty: 1 EACH | Refills: 0 | Status: SHIPPED | OUTPATIENT
Start: 2022-01-28

## 2022-01-28 NOTE — PROGRESS NOTES
Telehealth Visit      I spoke with Derrek Valenzuela by secure [employer] video service on 01/28/22, verified date of birth, patient stated they are in the state of [place], and discussed their concerns as below:     Back Pain  This is a chronic problem present. Decreased range of motion. Skin:     Coloration: Skin is not jaundiced. Neurological:      General: No focal deficit present. Mental Status: She is alert and oriented to person, place, and time. Mental status is at baseline.    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 Every conscious effort was taken to allow for sufficient and adequate time to complete the visit. The patient was made aware of the limitations of the telehealth visit, including treatment limitations as no physical exam could be performed.   The pa such as flexeril, take as directed to help reduce muscle spasms and pain. The maximum dose is 10mg however any dose of this medication can make you extremely drowsy, no driving.     • You may use exercises and stretches as discussed below, or check out the bending, or stretching from an accident, or accidental movement  · Poor posture  · Stretching or moving wrong, without noticing pain at the time  · Poor coordination, lack of regular exercise (check with your doctor about this)  · Spinal disc disease or ar pad) reduces pain and works well for muscle spasms. Heat can be applied to the painful area for 20 minutes then remove it for 20 minutes. Do this over a period of 60 to 90 minutes or several times a day. Do not sleep on a heating pad.  It can lead to skin b [address] [facility], [address] [place] [address]. All rights reserved. This information is not intended as a substitute for professional medical care. Always follow your healthcare professional's instructions.     Self-Care for Low Back Pain    Most if:  · You can't stand or walk. · You have a temperature over 100.4°F (38.0°C)  · You have frequent, painful, or bloody urination. · You have severe abdominal pain. · You have a sharp, stabbing pain. · Your pain is constant.   · You have pain, tingling, sore muscles and improve blood flow. · Try a warm bath or shower. Or use a heating pad set on low. To prevent a burn, keep a cloth between you and the heating pad. · Don’t use a heating pad for more than 15 minutes at a time.  Never sleep on a heating pad the first 24 to 72 hours after an injury or flare-up, put an ice pack on the painful area for 20 minutes, then remove it for 20 minutes. Do this over a period of 60 to 90 minutes, or several times a day. This will reduce swelling and pain.  Always wrap ice drive or operate heavy machinery when taking these medicines. Take pain medicine only as prescribed by your healthcare provider. Follow-up care  Follow up with your doctor, or as advised. You may need physical therapy or more tests.   If X-rays were taken,

## 2022-03-01 ENCOUNTER — TELEPHONE (OUTPATIENT)
Dept: SURGERY | Facility: CLINIC | Age: 57
End: 2022-03-01

## 2022-03-01 NOTE — TELEPHONE ENCOUNTER
Returning call due to abdominal pain. Got VM. Left message that patient should go to urgent care or ED if pain is excruciating as described and particularly if accompanied by fever.       FRANCISCO Hurtado  Nurse Practitioner, Hepatology  [phone] (office)

## 2022-03-15 ENCOUNTER — OFFICE VISIT (OUTPATIENT)
Dept: INTERNAL MEDICINE CLINIC | Facility: CLINIC | Age: 57
End: 2022-03-15
Payer: MEDICAID

## 2022-03-15 VITALS
WEIGHT: 155 LBS | DIASTOLIC BLOOD PRESSURE: 75 MMHG | BODY MASS INDEX: 27.46 KG/M2 | HEART RATE: 90 BPM | SYSTOLIC BLOOD PRESSURE: 107 MMHG | HEIGHT: 63 IN

## 2022-03-15 DIAGNOSIS — H01.119 EYELID DERMATITIS, ALLERGIC/CONTACT: ICD-10-CM

## 2022-03-15 DIAGNOSIS — H53.8 BLURRY VISION: ICD-10-CM

## 2022-03-15 DIAGNOSIS — L30.9 DERMATITIS: Primary | ICD-10-CM

## 2022-03-15 PROCEDURE — 3008F BODY MASS INDEX DOCD: CPT | Performed by: INTERNAL MEDICINE

## 2022-03-15 PROCEDURE — 99214 OFFICE O/P EST MOD 30 MIN: CPT | Performed by: INTERNAL MEDICINE

## 2022-03-15 PROCEDURE — 3074F SYST BP LT 130 MM HG: CPT | Performed by: INTERNAL MEDICINE

## 2022-03-15 PROCEDURE — 3078F DIAST BP <80 MM HG: CPT | Performed by: INTERNAL MEDICINE

## 2022-03-15 RX ORDER — ERYTHROMYCIN 5 MG/G
1 OINTMENT OPHTHALMIC NIGHTLY
Qty: 3.5 G | Refills: 1 | Status: SHIPPED | OUTPATIENT
Start: 2022-03-15 | End: 2022-03-20
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
## 2022-03-19 ENCOUNTER — TELEPHONE (OUTPATIENT)
Dept: INTERNAL MEDICINE CLINIC | Facility: CLINIC | Age: 57
End: 2022-03-19

## 2022-03-19 NOTE — TELEPHONE ENCOUNTER
Pt called back- states the rash around her eye is gone, states redness remain, and still has itchiness. She noticed this morning when she wakes up her eye feels crusty and oozy, please advise. Thanks.

## 2022-03-19 NOTE — TELEPHONE ENCOUNTER
Dr. Diego Gutierres - Do you recommend medication to send? First Call attempt. No answer. [employer] message Sent. Office phone number provided with office hours. Transfer to triage for follow up.

## 2022-03-19 NOTE — TELEPHONE ENCOUNTER
Patient calling to inform that the rash in her eye has not improved per 3/15/2022 visit .  Asking for prescription eye drops

## 2022-03-21 NOTE — TELEPHONE ENCOUNTER
Spoke to patient  (verified name and ) as per provider's note. Patient voiced understanding and agrees with plan. Patient states has Dr. Radha Hendricks phone number. [facility] number and address was given.

## 2022-03-21 NOTE — TELEPHONE ENCOUNTER
Recommend eval by opthal. Pt had cosmetic procedure done to eyebrows but this should not affect her eyes. Should get proper eye exam to determine if steroids vs antibiotics vs observation are needed. Dr Cedeno Better? Anyeone from [facility]?

## 2022-04-19 ENCOUNTER — TELEPHONE (OUTPATIENT)
Dept: OBGYN CLINIC | Facility: CLINIC | Age: 57
End: 2022-04-19

## 2022-04-19 DIAGNOSIS — N95.0 POSTMENOPAUSAL BLEEDING: Primary | ICD-10-CM

## 2022-04-22 ENCOUNTER — HOSPITAL ENCOUNTER (OUTPATIENT)
Dept: ULTRASOUND IMAGING | Age: 57
Discharge: HOME | End: 2022-04-22
Attending: OBSTETRICS & GYNECOLOGY
Payer: MEDICAID

## 2022-04-22 DIAGNOSIS — N95.0 POSTMENOPAUSAL BLEEDING: ICD-10-CM

## 2022-04-22 PROCEDURE — 76856 US EXAM PELVIC COMPLETE: CPT | Performed by: OBSTETRICS & GYNECOLOGY

## 2022-04-22 PROCEDURE — 76830 TRANSVAGINAL US NON-OB: CPT | Performed by: OBSTETRICS & GYNECOLOGY

## 2022-04-25 ENCOUNTER — OFFICE VISIT (OUTPATIENT)
Dept: OBGYN CLINIC | Facility: CLINIC | Age: 57
End: 2022-04-25
Payer: MEDICAID

## 2022-04-25 ENCOUNTER — OFFICE VISIT (OUTPATIENT)
Dept: SURGERY | Facility: CLINIC | Age: 57
End: 2022-04-25
Payer: MEDICAID

## 2022-04-25 VITALS
BODY MASS INDEX: 28 KG/M2 | WEIGHT: 157.19 LBS | OXYGEN SATURATION: 98 % | TEMPERATURE: 81 F | HEART RATE: 81 BPM | SYSTOLIC BLOOD PRESSURE: 108 MMHG | DIASTOLIC BLOOD PRESSURE: 77 MMHG

## 2022-04-25 VITALS
DIASTOLIC BLOOD PRESSURE: 72 MMHG | SYSTOLIC BLOOD PRESSURE: 107 MMHG | WEIGHT: 158 LBS | HEART RATE: 92 BPM | BODY MASS INDEX: 28 KG/M2

## 2022-04-25 DIAGNOSIS — R93.89 THICKENED ENDOMETRIUM: ICD-10-CM

## 2022-04-25 DIAGNOSIS — R79.89 ELEVATED LIVER FUNCTION TESTS: ICD-10-CM

## 2022-04-25 DIAGNOSIS — N95.0 POSTMENOPAUSAL BLEEDING: Primary | ICD-10-CM

## 2022-04-25 DIAGNOSIS — N84.1 CERVICAL POLYP: ICD-10-CM

## 2022-04-25 PROCEDURE — 3078F DIAST BP <80 MM HG: CPT | Performed by: OBSTETRICS & GYNECOLOGY

## 2022-04-25 PROCEDURE — 58100 BIOPSY OF UTERUS LINING: CPT | Performed by: OBSTETRICS & GYNECOLOGY

## 2022-04-25 PROCEDURE — 3074F SYST BP LT 130 MM HG: CPT | Performed by: OBSTETRICS & GYNECOLOGY

## 2022-04-25 NOTE — PROGRESS NOTES
Endometrial Biopsy    Pre-Procedure Care:   Consent was obtained. Procedure/risks were explained. Questions were answered. Correct patient was identified. Correct side and site were confirmed. Pregnancy Results: n/a    Birth control method(s) used:     Pre-Medications: The patient was premedicated with Ibuprofen . Description of Procedure:  Under satisfactory analgesia, the patient was prepped and draped in the dorsal lithotomy position. A bivalve speculum was placed in the vagina and the cervix was prepped with Betadine solution. Single tooth tenaculum placed at the 12 o'clock position. The cervix was dilated using disposable sound  The uterine cavity was sounded at 7 cm. The endometrial cavity was curetted for pipelle tissue sampling, 1 passes. Specimen was sent to pathology. The single tooth tenaculum was removed. Good hemostasis was noted. There were no complications. There was no blood loss. Discharge instructions were provided to the patient. Visit Plan:  Await final pathology prior to treatment.

## 2022-04-26 ENCOUNTER — TELEPHONE (OUTPATIENT)
Dept: OBGYN CLINIC | Facility: CLINIC | Age: 57
End: 2022-04-26

## 2022-04-26 PROBLEM — N84.0 ENDOMETRIAL POLYP: Status: ACTIVE | Noted: 2022-04-26

## 2022-04-26 NOTE — TELEPHONE ENCOUNTER
OB GYN SURGICAL SCHEDULING    Assessment: Post menopausal bleeding, thickened endometrium, endometrial polyp and cervical polyp    Pre-Operative Procedure:  Cervical polypectomy followed by operative hysteroscopy, Myosure polypectomy followed by D and C    Side:     In / on:     Date:  Friday AM 05-20 while on call or Friday afternoon 05-13 at 1300. I prefer the 20th. Admission:  Day Surgery    Anesthesia: General    Additional Orders:  Routine Orders    Comments / Orders to Nurse:  This is Dr Barnes Co

## 2022-04-27 ENCOUNTER — MED REC SCAN ONLY (OUTPATIENT)
Dept: INTERNAL MEDICINE CLINIC | Facility: CLINIC | Age: 57
End: 2022-04-27

## 2022-04-27 NOTE — TELEPHONE ENCOUNTER
Called pt asked to be called tomorrow morning since driving.     Put Friday,05/20/2022 at 11am on hold in OR

## 2022-04-28 NOTE — TELEPHONE ENCOUNTER
Case has been r/s to 5/13 at 1pm. Date pt would have a ride hope after case    Surgical case request sent to r/s case from 5/20 to 5/13    Updated letter to reflect new date    Updated delayed staff message    Updated book and calender

## 2022-04-28 NOTE — TELEPHONE ENCOUNTER
Spoke to pt. Jeanmarie surgery is scheduled on Friday,05/20/2022 at 11am.    Wants to make sure SAE and anesthesiologist is aware that she has history of low blood pressure.  in the past has a minor procedure done and was sent some and had to call ER because BP drop extremely low and wants to make sure that does not happen again. [facility] did tell SAE but wants to make sure its noted on chart for surgery.  Raymundo Alcala is her boss and works with SAE and she is aware if this too. Message routed to SAE to please view bolded    Per [facility] No PA Needed    Minor case instructions provided.  (instructions provided on how to pull up letters via [employer])    Delayed staff message sent to MD to please place pre-op orders    Entered in book and calender

## 2022-04-28 NOTE — TELEPHONE ENCOUNTER
Pt has found a  for the the 65TM.  Will reschedule back to 5/20 at 11am    Surgery change request sent    Updated Delayed staff message    Updated letter sent     Updated book and calender

## 2022-05-01 ENCOUNTER — MOBILE ENCOUNTER (OUTPATIENT)
Dept: OBGYN CLINIC | Facility: CLINIC | Age: 57
End: 2022-05-01

## 2022-05-01 DIAGNOSIS — R35.0 URINARY FREQUENCY: Primary | ICD-10-CM

## 2022-05-09 ENCOUNTER — APPOINTMENT (OUTPATIENT)
Dept: CT IMAGING | Facility: HOSPITAL | Age: 57
End: 2022-05-09
Attending: EMERGENCY MEDICINE
Payer: MEDICAID

## 2022-05-09 ENCOUNTER — HOSPITAL ENCOUNTER (EMERGENCY)
Facility: HOSPITAL | Age: 57
Discharge: HOME | End: 2022-05-09
Attending: EMERGENCY MEDICINE
Payer: MEDICAID

## 2022-05-09 VITALS
DIASTOLIC BLOOD PRESSURE: 82 MMHG | HEART RATE: 74 BPM | TEMPERATURE: 98 F | SYSTOLIC BLOOD PRESSURE: 107 MMHG | BODY MASS INDEX: 27.46 KG/M2 | WEIGHT: 155 LBS | HEIGHT: 63 IN | RESPIRATION RATE: 18 BRPM | OXYGEN SATURATION: 99 %

## 2022-05-09 DIAGNOSIS — K57.92 ACUTE DIVERTICULITIS: Primary | ICD-10-CM

## 2022-05-09 LAB
ANION GAP SERPL CALC-SCNC: 4 MMOL/L (ref 0–18)
BASOPHILS # BLD AUTO: 0.03 X10(3) UL (ref 0–0.2)
BASOPHILS NFR BLD AUTO: 0.4 %
BUN BLD-MCNC: 13 MG/DL (ref 7–18)
BUN/CREAT SERPL: 19.1 (ref 10–20)
CALCIUM BLD-MCNC: 9.2 MG/DL (ref 8.5–10.1)
CHLORIDE SERPL-SCNC: 109 MMOL/L (ref 98–112)
CO2 SERPL-SCNC: 27 MMOL/L (ref 21–32)
CREAT BLD-MCNC: 0.68 MG/DL
DEPRECATED RDW RBC AUTO: 41.3 FL (ref 35.1–46.3)
EOSINOPHIL # BLD AUTO: 0.14 X10(3) UL (ref 0–0.7)
EOSINOPHIL NFR BLD AUTO: 1.7 %
ERYTHROCYTE [DISTWIDTH] IN BLOOD BY AUTOMATED COUNT: 12.6 % (ref 11–15)
GLUCOSE BLD-MCNC: 87 MG/DL (ref 70–99)
HCT VFR BLD AUTO: 43.2 %
HGB BLD-MCNC: 13.7 G/DL
IMM GRANULOCYTES # BLD AUTO: 0.04 X10(3) UL (ref 0–1)
IMM GRANULOCYTES NFR BLD: 0.5 %
LYMPHOCYTES # BLD AUTO: 1.55 X10(3) UL (ref 1–4)
LYMPHOCYTES NFR BLD AUTO: 18.3 %
MCH RBC QN AUTO: 28.2 PG (ref 26–34)
MCHC RBC AUTO-ENTMCNC: 31.7 G/DL (ref 31–37)
MCV RBC AUTO: 89.1 FL
MONOCYTES # BLD AUTO: 0.74 X10(3) UL (ref 0.1–1)
MONOCYTES NFR BLD AUTO: 8.8 %
NEUTROPHILS # BLD AUTO: 5.95 X10 (3) UL (ref 1.5–7.7)
NEUTROPHILS # BLD AUTO: 5.95 X10(3) UL (ref 1.5–7.7)
NEUTROPHILS NFR BLD AUTO: 70.3 %
OSMOLALITY SERPL CALC.SUM OF ELEC: 289 MOSM/KG (ref 275–295)
PLATELET # BLD AUTO: 206 10(3)UL (ref 150–450)
POTASSIUM SERPL-SCNC: 3.9 MMOL/L (ref 3.5–5.1)
RBC # BLD AUTO: 4.85 X10(6)UL
SODIUM SERPL-SCNC: 140 MMOL/L (ref 136–145)
WBC # BLD AUTO: 8.5 X10(3) UL (ref 4–11)

## 2022-05-09 PROCEDURE — 96361 HYDRATE IV INFUSION ADD-ON: CPT

## 2022-05-09 PROCEDURE — 74177 CT ABD & PELVIS W/CONTRAST: CPT | Performed by: EMERGENCY MEDICINE

## 2022-05-09 PROCEDURE — 96374 THER/PROPH/DIAG INJ IV PUSH: CPT

## 2022-05-09 PROCEDURE — 85025 COMPLETE CBC W/AUTO DIFF WBC: CPT | Performed by: EMERGENCY MEDICINE

## 2022-05-09 PROCEDURE — 99284 EMERGENCY DEPT VISIT MOD MDM: CPT

## 2022-05-09 PROCEDURE — 80048 BASIC METABOLIC PNL TOTAL CA: CPT | Performed by: EMERGENCY MEDICINE

## 2022-05-09 RX ORDER — CIPROFLOXACIN 500 MG/1
500 TABLET, FILM COATED ORAL 2 TIMES DAILY
Qty: 20 TABLET | Refills: 0 | Status: SHIPPED | OUTPATIENT
Start: 2022-05-09 | End: 2022-05-16

## 2022-05-09 RX ORDER — KETOROLAC TROMETHAMINE 30 MG/ML
30 INJECTION, SOLUTION INTRAMUSCULAR; INTRAVENOUS ONCE
Status: COMPLETED | OUTPATIENT
Start: 2022-05-09 | End: 2022-05-09

## 2022-05-09 RX ORDER — METRONIDAZOLE 500 MG/1
500 TABLET ORAL 3 TIMES DAILY
Qty: 30 TABLET | Refills: 0 | Status: SHIPPED | OUTPATIENT
Start: 2022-05-09 | End: 2022-05-16

## 2022-05-09 RX ORDER — HYDROCODONE BITARTRATE AND ACETAMINOPHEN 5; 325 MG/1; MG/1
1 TABLET ORAL EVERY 6 HOURS PRN
Qty: 10 TABLET | Refills: 0 | Status: SHIPPED | OUTPATIENT
Start: 2022-05-09 | End: 2022-05-16

## 2022-05-09 RX ORDER — MORPHINE SULFATE 4 MG/ML
4 INJECTION, SOLUTION INTRAMUSCULAR; INTRAVENOUS ONCE
Status: DISCONTINUED | OUTPATIENT
Start: 2022-05-09 | End: 2022-05-09

## 2022-05-16 ENCOUNTER — LAB ENCOUNTER (OUTPATIENT)
Dept: LAB | Age: 57
End: 2022-05-16
Attending: INTERNAL MEDICINE
Payer: MEDICAID

## 2022-05-16 ENCOUNTER — OFFICE VISIT (OUTPATIENT)
Dept: INTERNAL MEDICINE CLINIC | Facility: CLINIC | Age: 57
End: 2022-05-16
Payer: MEDICAID

## 2022-05-16 VITALS
HEART RATE: 84 BPM | SYSTOLIC BLOOD PRESSURE: 102 MMHG | HEIGHT: 63 IN | DIASTOLIC BLOOD PRESSURE: 71 MMHG | BODY MASS INDEX: 27.82 KG/M2 | WEIGHT: 157 LBS

## 2022-05-16 DIAGNOSIS — I95.2 HYPOTENSION DUE TO DRUGS: ICD-10-CM

## 2022-05-16 DIAGNOSIS — R93.89 THICKENED ENDOMETRIUM: ICD-10-CM

## 2022-05-16 DIAGNOSIS — N84.0 ENDOMETRIAL POLYP: ICD-10-CM

## 2022-05-16 DIAGNOSIS — Z01.818 PREOPERATIVE EXAMINATION: Primary | ICD-10-CM

## 2022-05-16 DIAGNOSIS — Z01.818 PREOPERATIVE EXAMINATION: ICD-10-CM

## 2022-05-16 DIAGNOSIS — N84.1 CERVICAL POLYP: ICD-10-CM

## 2022-05-16 DIAGNOSIS — N95.0 POSTMENOPAUSAL BLEEDING: ICD-10-CM

## 2022-05-16 LAB
ALBUMIN SERPL-MCNC: 4 G/DL (ref 3.4–5)
ALBUMIN/GLOB SERPL: 1 {RATIO} (ref 1–2)
ALP LIVER SERPL-CCNC: 76 U/L
ALT SERPL-CCNC: 85 U/L
ANION GAP SERPL CALC-SCNC: 5 MMOL/L (ref 0–18)
APTT PPP: 26.4 SECONDS (ref 23.3–35.6)
AST SERPL-CCNC: 42 U/L (ref 15–37)
BASOPHILS # BLD AUTO: 0.03 X10(3) UL (ref 0–0.2)
BASOPHILS NFR BLD AUTO: 0.5 %
BILIRUB SERPL-MCNC: 0.2 MG/DL (ref 0.1–2)
BILIRUB UR QL: NEGATIVE
BUN BLD-MCNC: 14 MG/DL (ref 7–18)
BUN/CREAT SERPL: 19.4 (ref 10–20)
CALCIUM BLD-MCNC: 9.3 MG/DL (ref 8.5–10.1)
CHLORIDE SERPL-SCNC: 110 MMOL/L (ref 98–112)
CO2 SERPL-SCNC: 24 MMOL/L (ref 21–32)
COLOR UR: YELLOW
CREAT BLD-MCNC: 0.72 MG/DL
DEPRECATED RDW RBC AUTO: 42.7 FL (ref 35.1–46.3)
EOSINOPHIL # BLD AUTO: 0.14 X10(3) UL (ref 0–0.7)
EOSINOPHIL NFR BLD AUTO: 2.4 %
ERYTHROCYTE [DISTWIDTH] IN BLOOD BY AUTOMATED COUNT: 12.8 % (ref 11–15)
FASTING STATUS PATIENT QL REPORTED: NO
GLOBULIN PLAS-MCNC: 3.9 G/DL (ref 2.8–4.4)
GLUCOSE BLD-MCNC: 122 MG/DL (ref 70–99)
GLUCOSE UR-MCNC: NEGATIVE MG/DL
HCT VFR BLD AUTO: 43.4 %
HGB BLD-MCNC: 13.6 G/DL
HGB UR QL STRIP.AUTO: NEGATIVE
IMM GRANULOCYTES # BLD AUTO: 0.04 X10(3) UL (ref 0–1)
IMM GRANULOCYTES NFR BLD: 0.7 %
INR BLD: 0.94 (ref 0.8–1.2)
KETONES UR-MCNC: NEGATIVE MG/DL
LYMPHOCYTES # BLD AUTO: 1.77 X10(3) UL (ref 1–4)
LYMPHOCYTES NFR BLD AUTO: 29.8 %
MCH RBC QN AUTO: 28.7 PG (ref 26–34)
MCHC RBC AUTO-ENTMCNC: 31.3 G/DL (ref 31–37)
MCV RBC AUTO: 91.6 FL
MONOCYTES # BLD AUTO: 0.44 X10(3) UL (ref 0.1–1)
MONOCYTES NFR BLD AUTO: 7.4 %
NEUTROPHILS # BLD AUTO: 3.51 X10 (3) UL (ref 1.5–7.7)
NEUTROPHILS # BLD AUTO: 3.51 X10(3) UL (ref 1.5–7.7)
NEUTROPHILS NFR BLD AUTO: 59.2 %
NITRITE UR QL STRIP.AUTO: NEGATIVE
OSMOLALITY SERPL CALC.SUM OF ELEC: 290 MOSM/KG (ref 275–295)
PH UR: 6 [PH] (ref 5–8)
PLATELET # BLD AUTO: 260 10(3)UL (ref 150–450)
POTASSIUM SERPL-SCNC: 4.1 MMOL/L (ref 3.5–5.1)
PROT SERPL-MCNC: 7.9 G/DL (ref 6.4–8.2)
PROT UR-MCNC: NEGATIVE MG/DL
PROTHROMBIN TIME: 12.7 SECONDS (ref 11.6–14.8)
RBC # BLD AUTO: 4.74 X10(6)UL
SODIUM SERPL-SCNC: 139 MMOL/L (ref 136–145)
SP GR UR STRIP: 1.01 (ref 1–1.03)
UROBILINOGEN UR STRIP-ACNC: <2
VIT C UR-MCNC: NEGATIVE MG/DL
WBC # BLD AUTO: 5.9 X10(3) UL (ref 4–11)

## 2022-05-16 PROCEDURE — 87086 URINE CULTURE/COLONY COUNT: CPT | Performed by: INTERNAL MEDICINE

## 2022-05-16 PROCEDURE — 80053 COMPREHEN METABOLIC PANEL: CPT

## 2022-05-16 PROCEDURE — 99215 OFFICE O/P EST HI 40 MIN: CPT | Performed by: INTERNAL MEDICINE

## 2022-05-16 PROCEDURE — 36415 COLL VENOUS BLD VENIPUNCTURE: CPT

## 2022-05-16 PROCEDURE — 85025 COMPLETE CBC W/AUTO DIFF WBC: CPT

## 2022-05-16 PROCEDURE — 93005 ELECTROCARDIOGRAM TRACING: CPT

## 2022-05-16 PROCEDURE — 3078F DIAST BP <80 MM HG: CPT | Performed by: INTERNAL MEDICINE

## 2022-05-16 PROCEDURE — 93010 ELECTROCARDIOGRAM REPORT: CPT | Performed by: INTERNAL MEDICINE

## 2022-05-16 PROCEDURE — 3074F SYST BP LT 130 MM HG: CPT | Performed by: INTERNAL MEDICINE

## 2022-05-16 PROCEDURE — 81001 URINALYSIS AUTO W/SCOPE: CPT | Performed by: INTERNAL MEDICINE

## 2022-05-16 PROCEDURE — 3008F BODY MASS INDEX DOCD: CPT | Performed by: INTERNAL MEDICINE

## 2022-05-16 PROCEDURE — 85610 PROTHROMBIN TIME: CPT

## 2022-05-16 PROCEDURE — 85730 THROMBOPLASTIN TIME PARTIAL: CPT

## 2022-05-16 NOTE — PATIENT INSTRUCTIONS
For your heel this is called Achilles tendinitis. Do not take any ibuprofen or any other NSAIDs nor steroids because you are undergoing a procedure in 4 days. You can however use topical Voltaren gel i.e. diclofenac gel which is over-the-counter or any other topical CBD creams if you would like. Voltaren gel is a topical NSAID that does not cause bleeding. Also use ice and please make sure to stretch slowly, if it is stiff use heat if it is painful use ice.

## 2022-05-18 ENCOUNTER — LAB ENCOUNTER (OUTPATIENT)
Dept: LAB | Facility: HOSPITAL | Age: 57
End: 2022-05-18
Attending: OBSTETRICS & GYNECOLOGY
Payer: MEDICAID

## 2022-05-18 ENCOUNTER — TELEPHONE (OUTPATIENT)
Dept: OBGYN CLINIC | Facility: CLINIC | Age: 57
End: 2022-05-18

## 2022-05-18 DIAGNOSIS — Z01.818 PRE-OP TESTING: ICD-10-CM

## 2022-05-18 LAB — SARS-COV-2 RNA RESP QL NAA+PROBE: NOT DETECTED

## 2022-05-18 RX ORDER — CIPROFLOXACIN 500 MG/1
500 TABLET, FILM COATED ORAL 2 TIMES DAILY
COMMUNITY
Start: 2022-05-08 | End: 2022-05-20

## 2022-05-20 ENCOUNTER — ANESTHESIA (OUTPATIENT)
Dept: SURGERY | Facility: HOSPITAL | Age: 57
End: 2022-05-20
Payer: MEDICAID

## 2022-05-20 ENCOUNTER — HOSPITAL ENCOUNTER (OUTPATIENT)
Facility: HOSPITAL | Age: 57
Setting detail: HOSPITAL OUTPATIENT SURGERY
Discharge: HOME | End: 2022-05-20
Attending: OBSTETRICS & GYNECOLOGY | Admitting: OBSTETRICS & GYNECOLOGY
Payer: MEDICAID

## 2022-05-20 ENCOUNTER — ANESTHESIA EVENT (OUTPATIENT)
Dept: SURGERY | Facility: HOSPITAL | Age: 57
End: 2022-05-20
Payer: MEDICAID

## 2022-05-20 VITALS
HEART RATE: 73 BPM | HEIGHT: 60 IN | WEIGHT: 155.88 LBS | TEMPERATURE: 97 F | BODY MASS INDEX: 30.61 KG/M2 | SYSTOLIC BLOOD PRESSURE: 95 MMHG | RESPIRATION RATE: 16 BRPM | OXYGEN SATURATION: 100 % | DIASTOLIC BLOOD PRESSURE: 66 MMHG

## 2022-05-20 DIAGNOSIS — Z01.818 PRE-OP TESTING: Primary | ICD-10-CM

## 2022-05-20 DIAGNOSIS — R93.89 THICKENED ENDOMETRIUM: ICD-10-CM

## 2022-05-20 DIAGNOSIS — N84.0 ENDOMETRIAL POLYP: ICD-10-CM

## 2022-05-20 DIAGNOSIS — N95.0 PMB (POSTMENOPAUSAL BLEEDING): ICD-10-CM

## 2022-05-20 DIAGNOSIS — N84.1 CERVICAL POLYP: ICD-10-CM

## 2022-05-20 LAB — B-HCG UR QL: NEGATIVE

## 2022-05-20 PROCEDURE — 0UB98ZZ EXCISION OF UTERUS, VIA NATURAL OR ARTIFICIAL OPENING ENDOSCOPIC: ICD-10-PCS | Performed by: OBSTETRICS & GYNECOLOGY

## 2022-05-20 PROCEDURE — 81025 URINE PREGNANCY TEST: CPT

## 2022-05-20 PROCEDURE — 88305 TISSUE EXAM BY PATHOLOGIST: CPT | Performed by: OBSTETRICS & GYNECOLOGY

## 2022-05-20 PROCEDURE — 0UDB8ZX EXTRACTION OF ENDOMETRIUM, VIA NATURAL OR ARTIFICIAL OPENING ENDOSCOPIC, DIAGNOSTIC: ICD-10-PCS | Performed by: OBSTETRICS & GYNECOLOGY

## 2022-05-20 RX ORDER — ACETAMINOPHEN 500 MG
1000 TABLET ORAL ONCE
Status: COMPLETED | OUTPATIENT
Start: 2022-05-20 | End: 2022-05-20

## 2022-05-20 RX ORDER — KETOROLAC TROMETHAMINE 30 MG/ML
INJECTION, SOLUTION INTRAMUSCULAR; INTRAVENOUS AS NEEDED
Status: DISCONTINUED | OUTPATIENT
Start: 2022-05-20 | End: 2022-05-20 | Stop reason: SURG

## 2022-05-20 RX ORDER — HYDROMORPHONE HYDROCHLORIDE 1 MG/ML
0.6 INJECTION, SOLUTION INTRAMUSCULAR; INTRAVENOUS; SUBCUTANEOUS EVERY 5 MIN PRN
Status: DISCONTINUED | OUTPATIENT
Start: 2022-05-20 | End: 2022-05-20

## 2022-05-20 RX ORDER — SODIUM CHLORIDE, SODIUM LACTATE, POTASSIUM CHLORIDE, CALCIUM CHLORIDE 600; 310; 30; 20 MG/100ML; MG/100ML; MG/100ML; MG/100ML
INJECTION, SOLUTION INTRAVENOUS CONTINUOUS
Status: DISCONTINUED | OUTPATIENT
Start: 2022-05-20 | End: 2022-05-20

## 2022-05-20 RX ORDER — NALOXONE HYDROCHLORIDE 0.4 MG/ML
80 INJECTION, SOLUTION INTRAMUSCULAR; INTRAVENOUS; SUBCUTANEOUS AS NEEDED
Status: DISCONTINUED | OUTPATIENT
Start: 2022-05-20 | End: 2022-05-20

## 2022-05-20 RX ORDER — MORPHINE SULFATE 4 MG/ML
2 INJECTION, SOLUTION INTRAMUSCULAR; INTRAVENOUS EVERY 10 MIN PRN
Status: DISCONTINUED | OUTPATIENT
Start: 2022-05-20 | End: 2022-05-20

## 2022-05-20 RX ORDER — MIDAZOLAM HYDROCHLORIDE 1 MG/ML
INJECTION INTRAMUSCULAR; INTRAVENOUS AS NEEDED
Status: DISCONTINUED | OUTPATIENT
Start: 2022-05-20 | End: 2022-05-20 | Stop reason: SURG

## 2022-05-20 RX ORDER — IBUPROFEN 600 MG/1
600 TABLET ORAL EVERY 6 HOURS PRN
Status: DISCONTINUED | OUTPATIENT
Start: 2022-05-20 | End: 2022-05-20

## 2022-05-20 RX ORDER — ONDANSETRON 2 MG/ML
INJECTION INTRAMUSCULAR; INTRAVENOUS AS NEEDED
Status: DISCONTINUED | OUTPATIENT
Start: 2022-05-20 | End: 2022-05-20 | Stop reason: SURG

## 2022-05-20 RX ORDER — MORPHINE SULFATE 10 MG/ML
6 INJECTION, SOLUTION INTRAMUSCULAR; INTRAVENOUS EVERY 10 MIN PRN
Status: DISCONTINUED | OUTPATIENT
Start: 2022-05-20 | End: 2022-05-20

## 2022-05-20 RX ORDER — HYDROMORPHONE HYDROCHLORIDE 1 MG/ML
0.4 INJECTION, SOLUTION INTRAMUSCULAR; INTRAVENOUS; SUBCUTANEOUS EVERY 5 MIN PRN
Status: DISCONTINUED | OUTPATIENT
Start: 2022-05-20 | End: 2022-05-20

## 2022-05-20 RX ORDER — MORPHINE SULFATE 4 MG/ML
4 INJECTION, SOLUTION INTRAMUSCULAR; INTRAVENOUS EVERY 10 MIN PRN
Status: DISCONTINUED | OUTPATIENT
Start: 2022-05-20 | End: 2022-05-20

## 2022-05-20 RX ORDER — DEXAMETHASONE SODIUM PHOSPHATE 4 MG/ML
VIAL (ML) INJECTION AS NEEDED
Status: DISCONTINUED | OUTPATIENT
Start: 2022-05-20 | End: 2022-05-20 | Stop reason: SURG

## 2022-05-20 RX ORDER — IBUPROFEN 600 MG/1
600 TABLET ORAL EVERY 6 HOURS PRN
Qty: 15 TABLET | Refills: 0 | Status: SHIPPED | OUTPATIENT
Start: 2022-05-20

## 2022-05-20 RX ORDER — HYDROMORPHONE HYDROCHLORIDE 1 MG/ML
0.2 INJECTION, SOLUTION INTRAMUSCULAR; INTRAVENOUS; SUBCUTANEOUS EVERY 5 MIN PRN
Status: DISCONTINUED | OUTPATIENT
Start: 2022-05-20 | End: 2022-05-20

## 2022-05-20 RX ADMIN — SODIUM CHLORIDE, SODIUM LACTATE, POTASSIUM CHLORIDE, CALCIUM CHLORIDE: 600; 310; 30; 20 INJECTION, SOLUTION INTRAVENOUS at 12:28:00

## 2022-05-20 RX ADMIN — ONDANSETRON 4 MG: 2 INJECTION INTRAMUSCULAR; INTRAVENOUS at 12:17:00

## 2022-05-20 RX ADMIN — KETOROLAC TROMETHAMINE 30 MG: 30 INJECTION, SOLUTION INTRAMUSCULAR; INTRAVENOUS at 12:17:00

## 2022-05-20 RX ADMIN — DEXAMETHASONE SODIUM PHOSPHATE 4 MG: 4 MG/ML VIAL (ML) INJECTION at 11:53:00

## 2022-05-20 RX ADMIN — MIDAZOLAM HYDROCHLORIDE 2 MG: 1 INJECTION INTRAMUSCULAR; INTRAVENOUS at 11:51:00

## 2022-05-20 RX ADMIN — SODIUM CHLORIDE, SODIUM LACTATE, POTASSIUM CHLORIDE, CALCIUM CHLORIDE: 600; 310; 30; 20 INJECTION, SOLUTION INTRAVENOUS at 12:02:00

## 2022-05-20 NOTE — INTERVAL H&P NOTE
Pre-op Diagnosis: PMB (postmenopausal bleeding) [N95.0]  Thickened endometrium [R93.89]  Endometrial polyp [N84.0]  Cervical polyp [N84.1]    The above referenced H&P was reviewed by Rhina Jones. DO Shantel on 5/20/2022, the patient was examined and no significant changes have occurred in the patient's condition since the H&P was performed. I discussed with the patient and/or legal representative the potential benefits, risks and side effects of this procedure; the likelihood of the patient achieving goals; and potential problems that might occur during recuperation. I discussed reasonable alternatives to the procedure, including risks, benefits and side effects related to the alternatives and risks related to not receiving this procedure. We will proceed with procedure as planned.

## 2022-05-20 NOTE — ANESTHESIA PROCEDURE NOTES
Airway  Date/Time: 5/20/2022 11:54 AM  Urgency: Elective      General Information and Staff    Patient location during procedure: OR  Anesthesiologist: Selina Arredondo MD  Performed: anesthesiologist     Indications and Patient Condition  Indications for airway management: anesthesia  Sedation level: deep  Preoxygenated: yes  Patient position: sniffing  Mask difficulty assessment: 0 - not attempted    Final Airway Details  Final airway type: supraglottic airway      Successful airway: classic  Size 4      Number of attempts at approach: 1

## 2022-05-20 NOTE — BRIEF OP NOTE
Pre-Operative Diagnosis: PMB (postmenopausal bleeding) [N95.0]  Thickened endometrium [R93.89]  Endometrial polyp [N84.0]  Cervical polyp [N84.1]     Post-Operative Diagnosis: PMB (postmenopausal bleeding) [I08. 0]Thickened endometrium [R93.89] Submucous Fibroid [Y69. 0]Cervical polyp [N84.1]      Procedure Performed:   Cervical Polypectomy followed by operative Hysteroscopy, Myosure myomectomy followed by Dilation and Curettage    Surgeon(s) and Role:     * Alverto Funes DO - Primary    Assistant(s):        Surgical Findings: 1.2 cm fibroid      Specimen: Cervical polyp, Myosure myomectomy fragments,  Endometrial curettings     Estimated Blood Loss: 5 ml    Dictation Number:      Wesley PHAN.  [id number] [facility]  5/20/2022  12:58 PM

## 2022-05-26 NOTE — OPERATIVE REPORT
[facility]    PATIENT'S NAME: Valerie Washington   ATTENDING PHYSICIAN: Alverto Issa DO   OPERATING PHYSICIAN: Jennifer Issa DO   PATIENT ACCOUNT#:   [de-identified]    LOCATION:  [place] [address]  MEDICAL RECORD #:   [id number]       YOB: 1965  ADMISSION DATE:       05/20/2022      OPERATION DATE:  05/20/2022    OPERATIVE REPORT      PREOPERATIVE DIAGNOSIS:  Postmenopausal bleeding, thickened endometrium, endometrial polyp and cervical polyp. POSTOPERATIVE DIAGNOSIS:  Postmenopausal bleeding, thickened endometrium, submucous fibroid and cervical polyp. PROCEDURE:  Cervical polypectomy followed by operative hysteroscopy, MyoSure myomectomy followed by dilation and curettage. ASSISTANT:  None. ANESTHESIA:  General/LMA. ESTIMATED BLOOD LOSS:  5 mL. COMPLICATIONS:  None apparent. PATHOLOGY SPECIMENS:  Cervical polyp, MyoSure myomectomy fragments and endometrial curettings, all labeled separately. FINDINGS:  Gross findings:  External genitalia, vaginal mucosa and cervix were examined with only a single 6 mm polyp on the endocervix. Hysteroscopic findings show a 1.2 cm submucous fibroid at the fundus of the uterus. I was able to visualize the left tubal ostium, but not the right tubal ostium. No other lesions were seen. OPERATIVE TECHNIQUE:  After consent was obtained, patient was taken to the operating suite where a general anesthetic was administered, and she was prepped and draped in the usual fashion. Next, a bimanual exam performed and a single-tooth tenaculum placed on the cervix. The cervical polyp was removed with Allis forceps and set aside for pathology. No bleeding was seen. The cervix was then dilated to 6 mm. This was followed by hysteroscopy with the aforementioned findings. I asked for the MyoSure Reach device and we shaved the aforementioned submucous fibroid down to the base. Only scant bleeding was noted.   I then removed the hysteroscope and the MyoSure. I then performed sharp curettage in a clockwise/counterclockwise fashion with return of a small amount of tissue and blood. This was labeled and sent separately as well. All instruments were removed, and no bleeding was seen at the cervix. There was a single bleeding area from manipulation of a hymenal ring remnant at the 5 to 6 o'clock position at the introitus. This was bleeding, so I simply excised this with scissors and then used a small touch with monopolar cautery for hemostasis. No other bleeding was noted. All final sponge and instrument counts were noted to be correct. The patient was then awakened from anesthesia and transferred to postanesthesia care unit in stable condition. Dictated By Urbano Issa DO  d: 05/25/2022 15:59:58  t: 05/26/2022 00:37:20  Job [id number]  [facility]/    cc: Kevin Prader A. Macduff, DO Krunal R. Rica Mole, MD

## 2022-06-01 ENCOUNTER — TELEPHONE (OUTPATIENT)
Dept: OBGYN CLINIC | Facility: CLINIC | Age: 57
End: 2022-06-01

## 2022-06-01 NOTE — TELEPHONE ENCOUNTER
5/20/22- Cervical Polypectomy followed by operative Hysteroscopy, Myosure myomectomy followed by Dilation and Curettage. SAE- how soon did you want to see patient for post-op appt. Please advise on appt slot. Spoke with SAE- he states he will call patient for condition update.

## 2022-06-01 NOTE — TELEPHONE ENCOUNTER
Needs postop check appt w/ SAE -- has surgery 2 wks ago & I do not see any appts.  Can you ask when he is able to see her

## 2022-06-02 NOTE — TELEPHONE ENCOUNTER
I called and discussed post op course with Cristine. No complaints with respect to pain, bowel, bladder or bleeding.  She can simply f/u for annual exam at 1 year lisbeth from last exam.

## 2022-06-03 ENCOUNTER — OFFICE VISIT (OUTPATIENT)
Dept: GASTROENTEROLOGY | Facility: CLINIC | Age: 57
End: 2022-06-03
Payer: MEDICAID

## 2022-06-03 VITALS
HEIGHT: 60 IN | BODY MASS INDEX: 31.02 KG/M2 | DIASTOLIC BLOOD PRESSURE: 58 MMHG | HEART RATE: 78 BPM | SYSTOLIC BLOOD PRESSURE: 90 MMHG | WEIGHT: 158 LBS

## 2022-06-03 DIAGNOSIS — R79.89 ABNORMAL LFTS: ICD-10-CM

## 2022-06-03 DIAGNOSIS — K57.32 DIVERTICULITIS OF LARGE INTESTINE WITHOUT PERFORATION OR ABSCESS WITHOUT BLEEDING: ICD-10-CM

## 2022-06-03 DIAGNOSIS — K21.00 GASTROESOPHAGEAL REFLUX DISEASE WITH ESOPHAGITIS WITHOUT HEMORRHAGE: Primary | ICD-10-CM

## 2022-06-03 PROCEDURE — 3074F SYST BP LT 130 MM HG: CPT | Performed by: INTERNAL MEDICINE

## 2022-06-03 PROCEDURE — 99214 OFFICE O/P EST MOD 30 MIN: CPT | Performed by: INTERNAL MEDICINE

## 2022-06-03 PROCEDURE — 3078F DIAST BP <80 MM HG: CPT | Performed by: INTERNAL MEDICINE

## 2022-06-03 PROCEDURE — 3008F BODY MASS INDEX DOCD: CPT | Performed by: INTERNAL MEDICINE

## 2022-06-03 RX ORDER — OMEPRAZOLE 20 MG/1
20 CAPSULE, DELAYED RELEASE ORAL
Qty: 90 CAPSULE | Refills: 3 | Status: SHIPPED | OUTPATIENT
Start: 2022-06-03 | End: 2023-05-29

## 2022-06-03 NOTE — PATIENT INSTRUCTIONS
1. Metamucil 1 packet three times a week  2. Avoid constipation  3. Omeprazole to continue  4.  Continue Vitamin E

## 2022-06-15 ENCOUNTER — HOSPITAL ENCOUNTER (OUTPATIENT)
Dept: MAMMOGRAPHY | Age: 57
Discharge: HOME | End: 2022-06-15
Attending: CLINICAL NURSE SPECIALIST
Payer: MEDICAID

## 2022-06-15 DIAGNOSIS — Z12.31 ENCOUNTER FOR SCREENING MAMMOGRAM FOR MALIGNANT NEOPLASM OF BREAST: ICD-10-CM

## 2022-06-15 PROCEDURE — 77067 SCR MAMMO BI INCL CAD: CPT | Performed by: CLINICAL NURSE SPECIALIST

## 2022-06-15 PROCEDURE — 77063 BREAST TOMOSYNTHESIS BI: CPT | Performed by: CLINICAL NURSE SPECIALIST

## 2022-06-16 ENCOUNTER — OFFICE VISIT (OUTPATIENT)
Dept: OTOLARYNGOLOGY | Facility: CLINIC | Age: 57
End: 2022-06-16
Payer: MEDICAID

## 2022-06-16 VITALS — BODY MASS INDEX: 31.02 KG/M2 | WEIGHT: 158 LBS | HEIGHT: 60 IN

## 2022-06-16 DIAGNOSIS — R25.3 FASCICULATION OF TONGUE: Primary | ICD-10-CM

## 2022-06-16 DIAGNOSIS — Z87.898 HX OF DIZZINESS: ICD-10-CM

## 2022-06-16 PROCEDURE — 3008F BODY MASS INDEX DOCD: CPT | Performed by: OTOLARYNGOLOGY

## 2022-06-16 PROCEDURE — 99213 OFFICE O/P EST LOW 20 MIN: CPT | Performed by: OTOLARYNGOLOGY

## 2022-06-16 RX ORDER — CLONAZEPAM 0.5 MG/1
TABLET ORAL
Qty: 30 TABLET | Refills: 2 | Status: SHIPPED | OUTPATIENT
Start: 2022-06-16

## 2022-06-16 NOTE — PROGRESS NOTES
MAF patient. Normal mammogram. Repeat in one year, informed via [employer] result note.     FRANCISCO Ellis

## 2022-06-28 ENCOUNTER — NURSE TRIAGE (OUTPATIENT)
Dept: INTERNAL MEDICINE CLINIC | Facility: CLINIC | Age: 57
End: 2022-06-28

## 2022-06-29 NOTE — TELEPHONE ENCOUNTER
Patient states she did not go to ER yesterday because she feels \"so much better today. \"    Denies anymore tachycardia. States \"I also didn't go to ER because of Covid and I only go there if I have anything that is severe. \"    Patient scheduled herself with Dr. Lucius Lauren tomorrow at 10:20 am.

## 2022-06-30 ENCOUNTER — OFFICE VISIT (OUTPATIENT)
Dept: INTERNAL MEDICINE CLINIC | Facility: CLINIC | Age: 57
End: 2022-06-30
Payer: MEDICAID

## 2022-06-30 VITALS
HEART RATE: 77 BPM | BODY MASS INDEX: 31.02 KG/M2 | SYSTOLIC BLOOD PRESSURE: 99 MMHG | HEIGHT: 60 IN | WEIGHT: 158 LBS | DIASTOLIC BLOOD PRESSURE: 66 MMHG
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
## 2022-06-30 DIAGNOSIS — Z91.89 AT RISK FOR INFECTIOUS DISEASE DUE TO RECENT FOREIGN TRAVEL: ICD-10-CM

## 2022-06-30 DIAGNOSIS — B02.9 HERPES ZOSTER WITHOUT COMPLICATION: Primary | ICD-10-CM

## 2022-06-30 PROCEDURE — 3074F SYST BP LT 130 MM HG: CPT | Performed by: INTERNAL MEDICINE

## 2022-06-30 PROCEDURE — 3078F DIAST BP <80 MM HG: CPT | Performed by: INTERNAL MEDICINE

## 2022-06-30 PROCEDURE — 99214 OFFICE O/P EST MOD 30 MIN: CPT | Performed by: INTERNAL MEDICINE

## 2022-06-30 PROCEDURE — 3008F BODY MASS INDEX DOCD: CPT | Performed by: INTERNAL MEDICINE

## 2022-06-30 RX ORDER — VALACYCLOVIR HYDROCHLORIDE 1 G/1
1000 TABLET, FILM COATED ORAL EVERY 8 HOURS SCHEDULED
Qty: 21 TABLET | Refills: 0 | Status: SHIPPED | OUTPATIENT
Start: 2022-06-30 | End: 2022-07-07

## 2022-08-19 NOTE — PROGRESS NOTES
[facility]  [facility] [address]  [address]  [phone] ([phone]) CVA   • Hypertension Mother    • Diabetes Sister         type 2 - half (P)   • Colon Cancer Neg         close relative   • Glaucoma Neg    • Macular degeneration Neg       Social History    Tobacco Use      Smoking status: Current Some Day Smoker trend despite no significant increase in weight would be unusual for just NAFLD and could suggest additional contributor so would recommend getting liver biopsy to sort out cause of liver tests and rule out any autoimmune cause and can further confirm fibr 27

## 2022-08-21 ENCOUNTER — MOBILE ENCOUNTER (OUTPATIENT)
Dept: OBGYN CLINIC | Facility: CLINIC | Age: 57
End: 2022-08-21

## 2022-08-21 DIAGNOSIS — R50.9 FEVER, UNSPECIFIED FEVER CAUSE: Primary | ICD-10-CM

## 2022-08-22 ENCOUNTER — HOSPITAL ENCOUNTER (OUTPATIENT)
Age: 57
Discharge: HOME | End: 2022-08-22
Attending: EMERGENCY MEDICINE
Payer: MEDICAID

## 2022-08-22 ENCOUNTER — APPOINTMENT (OUTPATIENT)
Dept: GENERAL RADIOLOGY | Age: 57
End: 2022-08-22
Attending: EMERGENCY MEDICINE
Payer: MEDICAID

## 2022-08-22 VITALS
HEART RATE: 79 BPM | DIASTOLIC BLOOD PRESSURE: 96 MMHG | RESPIRATION RATE: 18 BRPM | OXYGEN SATURATION: 100 % | TEMPERATURE: 98 F | SYSTOLIC BLOOD PRESSURE: 108 MMHG

## 2022-08-22 DIAGNOSIS — J06.9 UPPER RESPIRATORY TRACT INFECTION, UNSPECIFIED TYPE: Primary | ICD-10-CM

## 2022-08-22 DIAGNOSIS — R00.2 PALPITATIONS: ICD-10-CM

## 2022-08-22 LAB
#MXD IC: 0.5 X10ˆ3/UL (ref 0.1–1)
BUN BLD-MCNC: 13 MG/DL (ref 7–18)
CHLORIDE BLD-SCNC: 104 MMOL/L (ref 98–112)
CO2 BLD-SCNC: 26 MMOL/L (ref 21–32)
CREAT BLD-MCNC: 0.7 MG/DL
GFR SERPLBLD BASED ON 1.73 SQ M-ARVRAT: 101 ML/MIN/1.73M2 (ref 60–?)
GLUCOSE BLD-MCNC: 98 MG/DL (ref 70–99)
HCT VFR BLD AUTO: 44.3 %
HCT VFR BLD CALC: 46 %
HGB BLD-MCNC: 14.2 G/DL
ISTAT IONIZED CALCIUM FOR CHEM 8: 1.23 MMOL/L (ref 1.12–1.32)
LYMPHOCYTES # BLD AUTO: 2.2 X10ˆ3/UL (ref 1–4)
LYMPHOCYTES NFR BLD AUTO: 30.4 %
MCH RBC QN AUTO: 27.8 PG (ref 26–34)
MCHC RBC AUTO-ENTMCNC: 32.1 G/DL (ref 31–37)
MCV RBC AUTO: 86.7 FL (ref 80–100)
MIXED CELL %: 7.4 %
NEUTROPHILS # BLD AUTO: 4.4 X10ˆ3/UL (ref 1.5–7.7)
NEUTROPHILS NFR BLD AUTO: 62.2 %
PLATELET # BLD AUTO: 259 X10ˆ3/UL (ref 150–450)
POTASSIUM BLD-SCNC: 4.2 MMOL/L (ref 3.6–5.1)
RBC # BLD AUTO: 5.11 X10ˆ6/UL
SARS-COV-2 RNA RESP QL NAA+PROBE: NOT DETECTED
SODIUM BLD-SCNC: 142 MMOL/L (ref 136–145)
TROPONIN I BLD-MCNC: <0.02 NG/ML
WBC # BLD AUTO: 7.1 X10ˆ3/UL (ref 4–11)

## 2022-08-22 PROCEDURE — 99214 OFFICE O/P EST MOD 30 MIN: CPT

## 2022-08-22 PROCEDURE — 84484 ASSAY OF TROPONIN QUANT: CPT

## 2022-08-22 PROCEDURE — 85025 COMPLETE CBC W/AUTO DIFF WBC: CPT | Performed by: EMERGENCY MEDICINE

## 2022-08-22 PROCEDURE — 80047 BASIC METABLC PNL IONIZED CA: CPT

## 2022-08-22 PROCEDURE — 99215 OFFICE O/P EST HI 40 MIN: CPT

## 2022-08-22 PROCEDURE — 93005 ELECTROCARDIOGRAM TRACING: CPT

## 2022-08-22 PROCEDURE — 71046 X-RAY EXAM CHEST 2 VIEWS: CPT | Performed by: EMERGENCY MEDICINE

## 2022-08-22 PROCEDURE — 93010 ELECTROCARDIOGRAM REPORT: CPT | Performed by: EMERGENCY MEDICINE

## 2022-08-22 PROCEDURE — 36415 COLL VENOUS BLD VENIPUNCTURE: CPT

## 2022-08-22 NOTE — ED INITIAL ASSESSMENT (HPI)
PATIENT ARRIVED AMBULATORY TO ROOM C/O SYMPTOMS THAT STARTED 2 DAYS AGO. +SORE THROAT. +SLIGHT COUGH. NO NASAL CONGESTION. NO N/V/D. +FEVERS.  EASY NON LABORED RESPIRATIONS

## 2022-09-01 ENCOUNTER — LAB ENCOUNTER (OUTPATIENT)
Dept: LAB | Facility: HOSPITAL | Age: 57
End: 2022-09-01
Attending: OBSTETRICS & GYNECOLOGY
Payer: MEDICAID

## 2022-09-01 DIAGNOSIS — R09.89 RUNNY NOSE: ICD-10-CM

## 2022-09-01 DIAGNOSIS — R09.89 RUNNY NOSE: Primary | ICD-10-CM

## 2022-09-01 DIAGNOSIS — R05.9 COUGH, UNSPECIFIED TYPE: ICD-10-CM

## 2022-09-01 LAB — SARS-COV-2 RNA RESP QL NAA+PROBE: NOT DETECTED

## 2022-09-13 ENCOUNTER — OFFICE VISIT (OUTPATIENT)
Dept: INTERNAL MEDICINE CLINIC | Facility: CLINIC | Age: 57
End: 2022-09-13
Payer: MEDICAID

## 2022-09-13 ENCOUNTER — HOSPITAL ENCOUNTER (OUTPATIENT)
Dept: GENERAL RADIOLOGY | Age: 57
Discharge: HOME | End: 2022-09-13
Attending: INTERNAL MEDICINE
Payer: MEDICAID

## 2022-09-13 ENCOUNTER — LAB ENCOUNTER (OUTPATIENT)
Dept: LAB | Age: 57
End: 2022-09-13
Attending: INTERNAL MEDICINE
Payer: MEDICAID

## 2022-09-13 VITALS
HEART RATE: 77 BPM | BODY MASS INDEX: 31.41 KG/M2 | WEIGHT: 160 LBS | DIASTOLIC BLOOD PRESSURE: 80 MMHG | HEIGHT: 60 IN | SYSTOLIC BLOOD PRESSURE: 117 MMHG

## 2022-09-13 DIAGNOSIS — M72.2 PLANTAR FASCIITIS OF RIGHT FOOT: Primary | ICD-10-CM

## 2022-09-13 DIAGNOSIS — Z13.228 SCREENING FOR ENDOCRINE, METABOLIC AND IMMUNITY DISORDER: ICD-10-CM

## 2022-09-13 DIAGNOSIS — E55.9 VITAMIN D DEFICIENCY: ICD-10-CM

## 2022-09-13 DIAGNOSIS — J45.41 MODERATE PERSISTENT ASTHMA WITH ACUTE EXACERBATION: ICD-10-CM

## 2022-09-13 DIAGNOSIS — Z13.29 SCREENING FOR ENDOCRINE, METABOLIC AND IMMUNITY DISORDER: ICD-10-CM

## 2022-09-13 DIAGNOSIS — Z13.0 SCREENING FOR ENDOCRINE, METABOLIC AND IMMUNITY DISORDER: ICD-10-CM

## 2022-09-13 DIAGNOSIS — M72.2 PLANTAR FASCIITIS OF RIGHT FOOT: ICD-10-CM

## 2022-09-13 LAB
ALBUMIN SERPL-MCNC: 4.1 G/DL (ref 3.4–5)
ALBUMIN/GLOB SERPL: 1.1 {RATIO} (ref 1–2)
ALP LIVER SERPL-CCNC: 78 U/L
ALT SERPL-CCNC: 75 U/L
ANION GAP SERPL CALC-SCNC: 8 MMOL/L (ref 0–18)
AST SERPL-CCNC: 38 U/L (ref 15–37)
BILIRUB SERPL-MCNC: 0.4 MG/DL (ref 0.1–2)
BUN BLD-MCNC: 16 MG/DL (ref 7–18)
BUN/CREAT SERPL: 22.9 (ref 10–20)
CALCIUM BLD-MCNC: 9.6 MG/DL (ref 8.5–10.1)
CHLORIDE SERPL-SCNC: 108 MMOL/L (ref 98–112)
CHOLEST SERPL-MCNC: 203 MG/DL (ref ?–200)
CO2 SERPL-SCNC: 26 MMOL/L (ref 21–32)
CREAT BLD-MCNC: 0.7 MG/DL
DEPRECATED RDW RBC AUTO: 42.3 FL (ref 35.1–46.3)
ERYTHROCYTE [DISTWIDTH] IN BLOOD BY AUTOMATED COUNT: 12.8 % (ref 11–15)
EST. AVERAGE GLUCOSE BLD GHB EST-MCNC: 105 MG/DL (ref 68–126)
FASTING PATIENT LIPID ANSWER: NO
FASTING STATUS PATIENT QL REPORTED: NO
GFR SERPLBLD BASED ON 1.73 SQ M-ARVRAT: 101 ML/MIN/1.73M2 (ref 60–?)
GLOBULIN PLAS-MCNC: 3.6 G/DL (ref 2.8–4.4)
GLUCOSE BLD-MCNC: 82 MG/DL (ref 70–99)
HBA1C MFR BLD: 5.3 % (ref ?–5.7)
HCT VFR BLD AUTO: 43.5 %
HDLC SERPL-MCNC: 53 MG/DL (ref 40–59)
HGB BLD-MCNC: 13.6 G/DL
LDLC SERPL CALC-MCNC: 130 MG/DL (ref ?–100)
MCH RBC QN AUTO: 28.2 PG (ref 26–34)
MCHC RBC AUTO-ENTMCNC: 31.3 G/DL (ref 31–37)
MCV RBC AUTO: 90.1 FL
NONHDLC SERPL-MCNC: 150 MG/DL (ref ?–130)
OSMOLALITY SERPL CALC.SUM OF ELEC: 294 MOSM/KG (ref 275–295)
PLATELET # BLD AUTO: 291 10(3)UL (ref 150–450)
POTASSIUM SERPL-SCNC: 4.1 MMOL/L (ref 3.5–5.1)
PROT SERPL-MCNC: 7.7 G/DL (ref 6.4–8.2)
RBC # BLD AUTO: 4.83 X10(6)UL
SODIUM SERPL-SCNC: 142 MMOL/L (ref 136–145)
TRIGL SERPL-MCNC: 111 MG/DL (ref 30–149)
TSI SER-ACNC: 1.09 MIU/ML (ref 0.36–3.74)
VIT D+METAB SERPL-MCNC: 20.3 NG/ML (ref 30–100)
VLDLC SERPL CALC-MCNC: 20 MG/DL (ref 0–30)
WBC # BLD AUTO: 6 X10(3) UL (ref 4–11)

## 2022-09-13 PROCEDURE — 3079F DIAST BP 80-89 MM HG: CPT | Performed by: INTERNAL MEDICINE

## 2022-09-13 PROCEDURE — 3074F SYST BP LT 130 MM HG: CPT | Performed by: INTERNAL MEDICINE

## 2022-09-13 PROCEDURE — 84443 ASSAY THYROID STIM HORMONE: CPT

## 2022-09-13 PROCEDURE — 82248 BILIRUBIN DIRECT: CPT | Performed by: INTERNAL MEDICINE

## 2022-09-13 PROCEDURE — 36415 COLL VENOUS BLD VENIPUNCTURE: CPT

## 2022-09-13 PROCEDURE — 99214 OFFICE O/P EST MOD 30 MIN: CPT | Performed by: INTERNAL MEDICINE

## 2022-09-13 PROCEDURE — 80061 LIPID PANEL: CPT

## 2022-09-13 PROCEDURE — 73630 X-RAY EXAM OF FOOT: CPT | Performed by: INTERNAL MEDICINE

## 2022-09-13 PROCEDURE — 85027 COMPLETE CBC AUTOMATED: CPT

## 2022-09-13 PROCEDURE — 82306 VITAMIN D 25 HYDROXY: CPT

## 2022-09-13 PROCEDURE — 83036 HEMOGLOBIN GLYCOSYLATED A1C: CPT

## 2022-09-13 PROCEDURE — 80053 COMPREHEN METABOLIC PANEL: CPT

## 2022-09-13 PROCEDURE — 3008F BODY MASS INDEX DOCD: CPT | Performed by: INTERNAL MEDICINE

## 2022-09-13 RX ORDER — METHYLPREDNISOLONE 4 MG/1
TABLET ORAL
Qty: 1 EACH | Refills: 0 | Status: SHIPPED | OUTPATIENT
Start: 2022-09-13 | End: 2022-09-14

## 2022-09-13 RX ORDER — ALBUTEROL SULFATE 90 UG/1
2 AEROSOL, METERED RESPIRATORY (INHALATION) EVERY 6 HOURS PRN
Qty: 3 EACH | Refills: 3 | Status: SHIPPED | OUTPATIENT
Start: 2022-09-13

## 2022-09-14 ENCOUNTER — OFFICE VISIT (OUTPATIENT)
Dept: INTERNAL MEDICINE CLINIC | Facility: CLINIC | Age: 57
End: 2022-09-14
Payer: MEDICAID

## 2022-09-14 VITALS
HEIGHT: 60 IN | WEIGHT: 160 LBS | BODY MASS INDEX: 31.41 KG/M2 | HEART RATE: 94 BPM | DIASTOLIC BLOOD PRESSURE: 76 MMHG | SYSTOLIC BLOOD PRESSURE: 111 MMHG

## 2022-09-14 DIAGNOSIS — Z00.00 ANNUAL PHYSICAL EXAM: Primary | ICD-10-CM

## 2022-09-14 DIAGNOSIS — Z13.6 SCREENING, ISCHEMIC HEART DISEASE: ICD-10-CM

## 2022-09-14 DIAGNOSIS — J45.41 MODERATE PERSISTENT ASTHMA WITH ACUTE EXACERBATION: ICD-10-CM

## 2022-09-14 PROCEDURE — 3078F DIAST BP <80 MM HG: CPT | Performed by: INTERNAL MEDICINE

## 2022-09-14 PROCEDURE — 3074F SYST BP LT 130 MM HG: CPT | Performed by: INTERNAL MEDICINE

## 2022-09-14 PROCEDURE — 99396 PREV VISIT EST AGE 40-64: CPT | Performed by: INTERNAL MEDICINE

## 2022-09-14 PROCEDURE — 3008F BODY MASS INDEX DOCD: CPT | Performed by: INTERNAL MEDICINE

## 2022-09-14 RX ORDER — PREDNISONE 20 MG/1
20 TABLET ORAL 2 TIMES DAILY WITH MEALS
Qty: 14 TABLET | Refills: 0 | Status: SHIPPED | OUTPATIENT
Start: 2022-09-14 | End: 2022-09-21

## 2022-09-14 RX ORDER — BENZONATATE 100 MG/1
100 CAPSULE ORAL 3 TIMES DAILY PRN
Qty: 90 CAPSULE | Refills: 1 | Status: SHIPPED | OUTPATIENT
Start: 2022-09-14

## 2022-09-14 RX ORDER — ALBUTEROL SULFATE 2.5 MG/3ML
2.5 SOLUTION RESPIRATORY (INHALATION) EVERY 6 HOURS PRN
Qty: 30 EACH | Refills: 3 | Status: SHIPPED | OUTPATIENT
Start: 2022-09-14

## 2022-09-14 NOTE — PATIENT INSTRUCTIONS
Start taking over-the-counter vitamin D 5000 units daily with breakfast or dinner. This value will help to increase your vitamin D levels and maintain them. The 1000 to 2000 IU dosing is often not enough for people in [place].

## 2022-09-29 ENCOUNTER — OFFICE VISIT (OUTPATIENT)
Dept: PODIATRY CLINIC | Facility: CLINIC | Age: 57
End: 2022-09-29

## 2022-09-29 DIAGNOSIS — M72.2 PLANTAR FASCIITIS OF RIGHT FOOT: Primary | ICD-10-CM

## 2022-09-29 PROCEDURE — 99243 OFF/OP CNSLTJ NEW/EST LOW 30: CPT | Performed by: PODIATRIST

## 2022-09-29 RX ORDER — IBUPROFEN 600 MG/1
600 TABLET ORAL 3 TIMES DAILY
Qty: 60 TABLET | Refills: 1 | Status: SHIPPED | OUTPATIENT
Start: 2022-09-29

## 2022-10-13 ENCOUNTER — OFFICE VISIT (OUTPATIENT)
Dept: PODIATRY CLINIC | Facility: CLINIC | Age: 57
End: 2022-10-13
Payer: MEDICAID

## 2022-10-13 DIAGNOSIS — M72.2 PLANTAR FASCIITIS OF RIGHT FOOT: Primary | ICD-10-CM

## 2022-10-13 PROCEDURE — 99213 OFFICE O/P EST LOW 20 MIN: CPT | Performed by: PODIATRIST

## 2022-12-21 DIAGNOSIS — Z87.898 HX OF DIZZINESS: ICD-10-CM

## 2022-12-23 NOTE — TELEPHONE ENCOUNTER
Future Appointments   Date Time Provider Lin April   1/12/2023 11:30 AM Ashia Mendoza MD [address] [facility] [facility]   This refill request is being sent to the provider for the following reason:  []Patient has not had an appointment within the past 12 months but has made an appointment on: ___  [x]Medication is not within protocol  []Patient did not complete follow up recommendations  []Other: ___

## 2022-12-24 RX ORDER — CLONAZEPAM 0.5 MG/1
TABLET ORAL
Qty: 15 TABLET | Refills: 0 | Status: SHIPPED | OUTPATIENT
Start: 2022-12-24

## 2022-12-27 ENCOUNTER — MED REC SCAN ONLY (OUTPATIENT)
Dept: INTERNAL MEDICINE CLINIC | Facility: CLINIC | Age: 57
End: 2022-12-27

## 2023-01-12 ENCOUNTER — OFFICE VISIT (OUTPATIENT)
Dept: OTOLARYNGOLOGY | Facility: CLINIC | Age: 58
End: 2023-01-12

## 2023-01-12 DIAGNOSIS — R25.3 FASCICULATION OF TONGUE: Primary | ICD-10-CM

## 2023-01-12 DIAGNOSIS — Z87.898 HX OF DIZZINESS: ICD-10-CM

## 2023-01-12 PROCEDURE — 99213 OFFICE O/P EST LOW 20 MIN: CPT | Performed by: OTOLARYNGOLOGY

## 2023-01-13 RX ORDER — CLONAZEPAM 0.5 MG/1
TABLET ORAL
Qty: 15 TABLET | Refills: 3 | Status: SHIPPED | OUTPATIENT
Start: 2023-01-13

## 2023-02-16 ENCOUNTER — HOSPITAL ENCOUNTER (OUTPATIENT)
Dept: CT IMAGING | Age: 58
Discharge: HOME | End: 2023-02-16
Attending: INTERNAL MEDICINE

## 2023-02-16 DIAGNOSIS — Z13.6 SCREENING, ISCHEMIC HEART DISEASE: ICD-10-CM

## 2023-02-16 LAB
POCT GLUCOSE CHOLESTECH: 101 (ref 70–140)
POCT HDL: 54 (ref 55–80)
POCT LDL: 96 (ref 0–99)
POCT TOTAL CHOLESTEROL: 185 (ref 110–200)
POCT TRIGLYCERIDES: 174 (ref 1–149)

## 2023-02-16 NOTE — PROGRESS NOTES
Date of Service 2/16/2023    Harshal Blue  Date of Birth 7/18/1965    Patient Age: 62year old    PCP: Tali Lacy MD  [address] [place] [address]  [facility] [address]    Consult Type  Type Scan/Screening: Heart Scan  Preliminary Heart Scan Score: 0                Body Mass Index  There is no height or weight on file to calculate BMI. Lipid Profile  Cholesterol: 185, done on 2/16/2023. HDL Cholesterol: 54, done on 2/16/2023. LDL Cholesterol: 96, done on 2/16/2023. TriGlycerides 174, done on 2/16/2023. LDL in September 2022 was 130. This was too high, goal is less than 100. Today it is 80, this is much better. Nurse Review  Risk factor information and results reviewed with Nurse: Yes    Recommended Follow Up:  Consult your physician regarding[de-identified] Final Heart Scan Report; Discuss potential for Incidental Finding    No data recorded      Recommendations for Change:  Nutrition Changes: Low Saturated Fat  Cholesterol Modification (goal of therapy depends upon your risk): Decrease Triglycerides (Ugly) Normal <150  Exercise: Enhance Current Program  Smoking Cessation: No Change Needed  Weight Management: Decrease Current Weight  Stress Management: Adopt Stress Management Techniques  Repeat Heart Scan: 5 years if Calcium Score is 0. 0; Discuss with your Physician          Gaetano Recommended Resources: Burt Castano RN        Please Contact the Nurse Heart Line with any Questions or Concerns [phone].

## 2023-02-24 ENCOUNTER — OFFICE VISIT (OUTPATIENT)
Dept: INTERNAL MEDICINE CLINIC | Facility: CLINIC | Age: 58
End: 2023-02-24

## 2023-02-24 VITALS
HEIGHT: 61 IN | HEART RATE: 76 BPM | SYSTOLIC BLOOD PRESSURE: 109 MMHG | DIASTOLIC BLOOD PRESSURE: 73 MMHG | BODY MASS INDEX: 30.4 KG/M2 | TEMPERATURE: 98 F | WEIGHT: 161 LBS

## 2023-02-24 DIAGNOSIS — M54.2 CERVICALGIA: ICD-10-CM

## 2023-02-24 DIAGNOSIS — R25.3 TONGUE FASCICULATION: ICD-10-CM

## 2023-02-24 DIAGNOSIS — H53.8 BLURRY VISION: Primary | ICD-10-CM

## 2023-02-24 DIAGNOSIS — G89.29 CHRONIC NONINTRACTABLE HEADACHE, UNSPECIFIED HEADACHE TYPE: ICD-10-CM

## 2023-02-24 DIAGNOSIS — F41.9 ANXIETY: ICD-10-CM

## 2023-02-24 DIAGNOSIS — R51.9 CHRONIC NONINTRACTABLE HEADACHE, UNSPECIFIED HEADACHE TYPE: ICD-10-CM

## 2023-02-24 DIAGNOSIS — G44.209 TENSION HEADACHE: ICD-10-CM

## 2023-02-24 DIAGNOSIS — M26.643 ARTHRITIS OF BOTH TEMPOROMANDIBULAR JOINTS: ICD-10-CM

## 2023-02-24 PROCEDURE — 3008F BODY MASS INDEX DOCD: CPT | Performed by: INTERNAL MEDICINE

## 2023-02-24 PROCEDURE — 99214 OFFICE O/P EST MOD 30 MIN: CPT | Performed by: INTERNAL MEDICINE

## 2023-02-24 PROCEDURE — 3078F DIAST BP <80 MM HG: CPT | Performed by: INTERNAL MEDICINE

## 2023-02-24 PROCEDURE — 3074F SYST BP LT 130 MM HG: CPT | Performed by: INTERNAL MEDICINE

## 2023-02-24 RX ORDER — CYCLOBENZAPRINE HCL 5 MG
TABLET ORAL 3 TIMES DAILY PRN
Qty: 90 TABLET | Refills: 1 | Status: SHIPPED | OUTPATIENT
Start: 2023-02-24

## 2023-02-24 RX ORDER — PREDNISONE 10 MG/1
TABLET ORAL
Qty: 18 TABLET | Refills: 0 | Status: SHIPPED | OUTPATIENT
Start: 2023-02-24 | End: 2023-03-05

## 2023-02-24 NOTE — PATIENT INSTRUCTIONS
I would like for you to start taking the prednisone as directed on the bottle, this will help to alleviate any inflammatory process, you will also use the Flexeril i.e. cyclobenzaprine as needed but will make you drowsy so in for just at night or before bed. With that being said a lot of your symptoms are multifactorial meaning there is no 1 singular cause. Having the neck tension with the headaches along with some of the grinding of the teeth and the burning sensation in the mouth or gums can be a sign of anxiety and since the clonazepam is being given that is why you are not having any panic attacks. However because you have been on clonazepam for so long it is no longer working because you have built up a tolerance.

## 2023-04-15 ENCOUNTER — APPOINTMENT (OUTPATIENT)
Dept: CT IMAGING | Facility: HOSPITAL | Age: 58
End: 2023-04-15
Attending: EMERGENCY MEDICINE
Payer: MEDICAID

## 2023-04-15 ENCOUNTER — HOSPITAL ENCOUNTER (EMERGENCY)
Facility: HOSPITAL | Age: 58
Discharge: HOME | End: 2023-04-15
Attending: EMERGENCY MEDICINE
Payer: MEDICAID

## 2023-04-15 VITALS
RESPIRATION RATE: 20 BRPM | DIASTOLIC BLOOD PRESSURE: 63 MMHG | TEMPERATURE: 98 F | SYSTOLIC BLOOD PRESSURE: 102 MMHG | HEART RATE: 70 BPM | WEIGHT: 167.75 LBS | BODY MASS INDEX: 32 KG/M2 | OXYGEN SATURATION: 97 %

## 2023-04-15 DIAGNOSIS — T62.0X1A TOXIC EFFECT OF INGESTED MUSHROOM, UNINTENTIONAL, INITIAL ENCOUNTER: Primary | ICD-10-CM

## 2023-04-15 LAB
ANION GAP SERPL CALC-SCNC: 6 MMOL/L (ref 0–18)
APTT PPP: 27.2 SECONDS (ref 23.3–35.6)
BASOPHILS # BLD AUTO: 0.04 X10(3) UL (ref 0–0.2)
BASOPHILS NFR BLD AUTO: 0.5 %
BUN BLD-MCNC: 18 MG/DL (ref 7–18)
BUN/CREAT SERPL: 25.7 (ref 10–20)
CALCIUM BLD-MCNC: 9.6 MG/DL (ref 8.5–10.1)
CHLORIDE SERPL-SCNC: 109 MMOL/L (ref 98–112)
CO2 SERPL-SCNC: 23 MMOL/L (ref 21–32)
CREAT BLD-MCNC: 0.7 MG/DL
DEPRECATED RDW RBC AUTO: 39.1 FL (ref 35.1–46.3)
EOSINOPHIL # BLD AUTO: 0.19 X10(3) UL (ref 0–0.7)
EOSINOPHIL NFR BLD AUTO: 2.2 %
ERYTHROCYTE [DISTWIDTH] IN BLOOD BY AUTOMATED COUNT: 12.8 % (ref 11–15)
GFR SERPLBLD BASED ON 1.73 SQ M-ARVRAT: 101 ML/MIN/1.73M2 (ref 60–?)
GLUCOSE BLD-MCNC: 117 MG/DL (ref 70–99)
GLUCOSE BLDC GLUCOMTR-MCNC: 111 MG/DL (ref 70–99)
HCT VFR BLD AUTO: 42.5 %
HGB BLD-MCNC: 14.2 G/DL
IMM GRANULOCYTES # BLD AUTO: 0.03 X10(3) UL (ref 0–1)
IMM GRANULOCYTES NFR BLD: 0.3 %
INR BLD: 0.99 (ref 0.85–1.16)
LYMPHOCYTES # BLD AUTO: 2.03 X10(3) UL (ref 1–4)
LYMPHOCYTES NFR BLD AUTO: 23.3 %
MCH RBC QN AUTO: 28.3 PG (ref 26–34)
MCHC RBC AUTO-ENTMCNC: 33.4 G/DL (ref 31–37)
MCV RBC AUTO: 84.7 FL
MONOCYTES # BLD AUTO: 0.67 X10(3) UL (ref 0.1–1)
MONOCYTES NFR BLD AUTO: 7.7 %
NEUTROPHILS # BLD AUTO: 5.74 X10 (3) UL (ref 1.5–7.7)
NEUTROPHILS # BLD AUTO: 5.74 X10(3) UL (ref 1.5–7.7)
NEUTROPHILS NFR BLD AUTO: 66 %
OSMOLALITY SERPL CALC.SUM OF ELEC: 289 MOSM/KG (ref 275–295)
PLATELET # BLD AUTO: 277 10(3)UL (ref 150–450)
POTASSIUM SERPL-SCNC: 4.1 MMOL/L (ref 3.5–5.1)
PROTHROMBIN TIME: 13 SECONDS (ref 11.6–14.8)
RBC # BLD AUTO: 5.02 X10(6)UL
SODIUM SERPL-SCNC: 138 MMOL/L (ref 136–145)
WBC # BLD AUTO: 8.7 X10(3) UL (ref 4–11)

## 2023-04-15 PROCEDURE — 70450 CT HEAD/BRAIN W/O DYE: CPT | Performed by: EMERGENCY MEDICINE

## 2023-04-15 PROCEDURE — 85730 THROMBOPLASTIN TIME PARTIAL: CPT | Performed by: EMERGENCY MEDICINE

## 2023-04-15 PROCEDURE — 70498 CT ANGIOGRAPHY NECK: CPT | Performed by: EMERGENCY MEDICINE

## 2023-04-15 PROCEDURE — 96374 THER/PROPH/DIAG INJ IV PUSH: CPT

## 2023-04-15 PROCEDURE — 85025 COMPLETE CBC W/AUTO DIFF WBC: CPT | Performed by: EMERGENCY MEDICINE

## 2023-04-15 PROCEDURE — 99285 EMERGENCY DEPT VISIT HI MDM: CPT

## 2023-04-15 PROCEDURE — 93010 ELECTROCARDIOGRAM REPORT: CPT

## 2023-04-15 PROCEDURE — 80048 BASIC METABOLIC PNL TOTAL CA: CPT | Performed by: EMERGENCY MEDICINE

## 2023-04-15 PROCEDURE — 82962 GLUCOSE BLOOD TEST: CPT

## 2023-04-15 PROCEDURE — 85610 PROTHROMBIN TIME: CPT | Performed by: EMERGENCY MEDICINE

## 2023-04-15 PROCEDURE — 93005 ELECTROCARDIOGRAM TRACING: CPT

## 2023-04-15 PROCEDURE — 99284 EMERGENCY DEPT VISIT MOD MDM: CPT

## 2023-04-15 PROCEDURE — 70496 CT ANGIOGRAPHY HEAD: CPT | Performed by: EMERGENCY MEDICINE

## 2023-04-15 RX ORDER — LORAZEPAM 2 MG/ML
1 INJECTION INTRAMUSCULAR ONCE
Status: COMPLETED | OUTPATIENT
Start: 2023-04-15 | End: 2023-04-15

## 2023-04-15 NOTE — PROGRESS NOTES
04/15/23 5288   Clinical Encounter Type   Visited With Patient   Routine Visit Introduction  (Introduce self, as pt going to testing)   Continue Visiting Yes  (Follow up if requested)   Patient's Supportive Strategies/Resources Pt doing testing   Referral From Nurse   Referral To [facility] [address] Needs   (said prayer silently for pt, as taken for testing)   Sacramental Encounters   Communion Given Indicator No   Patient Spiritual Encounters   Spiritual Assessment Completed No  (Follw up with pt once in room, just had quick introduction to spiritual care dept)   Taxonomy   Methods Offer support   Interventions Acknowledge current situation;Prayer for healing     Quick intro to pt, as  pt was going to testing, check in again once pt gets to a room upstairs. Conrad follow up as able, pt is Sabianist  Provided prayer, and words of encouragement. No. REJI screening performed.  STOP BANG Legend: 0-2 = LOW Risk; 3-4 = INTERMEDIATE Risk; 5-8 = HIGH Risk

## 2023-04-15 NOTE — PROGRESS NOTES
04/15/23 1602   Clinical Encounter Type   Visited With Patient   Routine Visit Follow-up   Continue Visiting No  (Prayed for pt and her 2 sons, pt shared her story that landed her in the hospital today, also shared story of her sons drug use.)   Crisis Visit ED   Patient's Supportive Strategies/Resources Prayed  for pt, and 2 sons, empathic  listening as pt told her story and  consersn over her adult children. Referral From Nurse   Referral To    Taoism Encounters   Taoism Needs Prayer   Sacramental Encounters   Communion Given Indicator No   Sacrament Other Prayer for pts current health situation and for complete healing   Patient Spiritual Encounters   Spiritual Assessment Completed Yes  (pt was angry with son for leaving choclates that are infused with mushrooms in the home)   Taxonomy   Intended Effects Convey a calming presence   Methods Offer support;Assist with finding purpose;Explore presence of God   Interventions Active listening;Discuss concerns; Explain  role; Ask questions to bring forth feelings; Discuss frustrations with someone; Ask guided questions about purpose   Trigger for Consult   Trigger for Spiritual Care Consult No     Provided empathic listening as pt, shared her story of why she is in the hospital today, provided words of comfort, and hope, prayed for pt and her 2 sons. Also put their names on [email or web address] [address] list, in [place] as that was very important to this patient. Pt was hoping to feel better quickly, and go home soon. No follow up needed at this time.

## 2023-04-15 NOTE — ED QUICK NOTES
Pt's son verbalized to rn and edmd at bedside that the pt ate 1-2 presley's magic mushrooms along with one dose of her prescribed clonazepam.

## 2023-04-15 NOTE — DISCHARGE INSTRUCTIONS
The effects of magic mushrooms take up to 8 hours to resolve. You can have a disconnected feeling, dizziness, nausea, numbness/tingling, anxiety. These effects will stop on their own.

## 2023-04-15 NOTE — ED QUICK NOTES
Assumed care for pt @ this time, pt currently resting in bed awake alert, respirations unlabored, no acute distress. Continuing to monitor.

## 2023-04-15 NOTE — ED QUICK NOTES
Pt inquiring about dinner tray which was ordered and called in right after ordering, per room service tray is en route.

## 2023-04-15 NOTE — ED INITIAL ASSESSMENT (HPI)
Stroke alert called from field. Last known well: 1230. Generalized weakness, trouble speaking, l arm paresthesia, and left facial droop.

## 2023-04-16 LAB
ATRIAL RATE: 69 BPM
P AXIS: 36 DEGREES
P-R INTERVAL: 160 MS
Q-T INTERVAL: 406 MS
QRS DURATION: 86 MS
QTC CALCULATION (BEZET): 435 MS
R AXIS: 24 DEGREES
T AXIS: 33 DEGREES
VENTRICULAR RATE: 69 BPM

## 2023-05-24 ENCOUNTER — NURSE TRIAGE (OUTPATIENT)
Dept: INTERNAL MEDICINE CLINIC | Facility: CLINIC | Age: 58
End: 2023-05-24

## 2023-05-25 ENCOUNTER — OFFICE VISIT (OUTPATIENT)
Dept: INTERNAL MEDICINE CLINIC | Facility: CLINIC | Age: 58
End: 2023-05-25

## 2023-05-25 VITALS
HEART RATE: 87 BPM | SYSTOLIC BLOOD PRESSURE: 107 MMHG | HEIGHT: 61 IN | DIASTOLIC BLOOD PRESSURE: 71 MMHG | WEIGHT: 164 LBS | BODY MASS INDEX: 30.96 KG/M2

## 2023-05-25 DIAGNOSIS — M54.50 LEFT LOW BACK PAIN, UNSPECIFIED CHRONICITY, UNSPECIFIED WHETHER SCIATICA PRESENT: Primary | ICD-10-CM

## 2023-05-25 PROCEDURE — 99213 OFFICE O/P EST LOW 20 MIN: CPT | Performed by: NURSE PRACTITIONER

## 2023-05-25 PROCEDURE — 3074F SYST BP LT 130 MM HG: CPT | Performed by: NURSE PRACTITIONER

## 2023-05-25 PROCEDURE — 3078F DIAST BP <80 MM HG: CPT | Performed by: NURSE PRACTITIONER

## 2023-05-25 PROCEDURE — 3008F BODY MASS INDEX DOCD: CPT | Performed by: NURSE PRACTITIONER

## 2023-05-25 RX ORDER — LIDOCAINE 50 MG/G
1 PATCH TOPICAL EVERY 24 HOURS
Qty: 15 EACH | Refills: 0 | Status: SHIPPED | OUTPATIENT
Start: 2023-05-25

## 2023-05-25 RX ORDER — CYCLOBENZAPRINE HCL 5 MG
5 TABLET ORAL 2 TIMES DAILY PRN
Qty: 30 TABLET | Refills: 0 | Status: SHIPPED | OUTPATIENT
Start: 2023-05-25

## 2023-06-01 DIAGNOSIS — Z87.898 HX OF DIZZINESS: ICD-10-CM

## 2023-06-12 RX ORDER — CLONAZEPAM 0.5 MG/1
TABLET ORAL
Qty: 15 TABLET | Refills: 0 | Status: SHIPPED | OUTPATIENT
Start: 2023-06-12

## 2023-06-15 ENCOUNTER — NURSE TRIAGE (OUTPATIENT)
Dept: INTERNAL MEDICINE CLINIC | Facility: CLINIC | Age: 58
End: 2023-06-15

## 2023-06-16 ENCOUNTER — OFFICE VISIT (OUTPATIENT)
Dept: INTERNAL MEDICINE CLINIC | Facility: CLINIC | Age: 58
End: 2023-06-16

## 2023-06-16 ENCOUNTER — TELEPHONE (OUTPATIENT)
Dept: INTERNAL MEDICINE CLINIC | Facility: CLINIC | Age: 58
End: 2023-06-16

## 2023-06-16 VITALS
DIASTOLIC BLOOD PRESSURE: 74 MMHG | BODY MASS INDEX: 30.78 KG/M2 | HEIGHT: 61 IN | SYSTOLIC BLOOD PRESSURE: 110 MMHG | OXYGEN SATURATION: 92 % | WEIGHT: 163 LBS | HEART RATE: 87 BPM

## 2023-06-16 DIAGNOSIS — B35.1 ONYCHOMYCOSIS: ICD-10-CM

## 2023-06-16 DIAGNOSIS — M54.50 LOW BACK PAIN RADIATING TO LEFT LOWER EXTREMITY: Primary | ICD-10-CM

## 2023-06-16 DIAGNOSIS — M79.605 LOW BACK PAIN RADIATING TO LEFT LOWER EXTREMITY: Primary | ICD-10-CM

## 2023-06-16 DIAGNOSIS — K76.0 FATTY LIVER: ICD-10-CM

## 2023-06-16 PROCEDURE — 3074F SYST BP LT 130 MM HG: CPT | Performed by: INTERNAL MEDICINE

## 2023-06-16 PROCEDURE — 99214 OFFICE O/P EST MOD 30 MIN: CPT | Performed by: INTERNAL MEDICINE

## 2023-06-16 PROCEDURE — 3008F BODY MASS INDEX DOCD: CPT | Performed by: INTERNAL MEDICINE

## 2023-06-16 PROCEDURE — 3078F DIAST BP <80 MM HG: CPT | Performed by: INTERNAL MEDICINE

## 2023-06-16 RX ORDER — TERBINAFINE HYDROCHLORIDE 250 MG/1
250 TABLET ORAL DAILY
Qty: 81 TABLET | Refills: 0 | Status: SHIPPED | OUTPATIENT
Start: 2023-06-16 | End: 2023-06-16 | Stop reason: ALTCHOICE

## 2023-06-16 RX ORDER — OMEPRAZOLE 20 MG/1
20 CAPSULE, DELAYED RELEASE ORAL
COMMUNITY
Start: 2023-06-03

## 2023-06-23 ENCOUNTER — HOSPITAL ENCOUNTER (OUTPATIENT)
Dept: GENERAL RADIOLOGY | Age: 58
Discharge: HOME | End: 2023-06-23
Attending: INTERNAL MEDICINE
Payer: MEDICAID

## 2023-06-23 ENCOUNTER — LAB ENCOUNTER (OUTPATIENT)
Dept: LAB | Age: 58
End: 2023-06-23
Attending: INTERNAL MEDICINE
Payer: MEDICAID
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
## 2023-06-23 DIAGNOSIS — K76.0 FATTY LIVER: ICD-10-CM

## 2023-06-23 DIAGNOSIS — Z13.228 SCREENING FOR ENDOCRINE, METABOLIC AND IMMUNITY DISORDER: ICD-10-CM

## 2023-06-23 DIAGNOSIS — M79.605 LOW BACK PAIN RADIATING TO LEFT LOWER EXTREMITY: ICD-10-CM

## 2023-06-23 DIAGNOSIS — Z13.29 SCREENING FOR ENDOCRINE, METABOLIC AND IMMUNITY DISORDER: ICD-10-CM

## 2023-06-23 DIAGNOSIS — M54.50 LOW BACK PAIN RADIATING TO LEFT LOWER EXTREMITY: ICD-10-CM

## 2023-06-23 DIAGNOSIS — Z13.0 SCREENING FOR ENDOCRINE, METABOLIC AND IMMUNITY DISORDER: ICD-10-CM

## 2023-06-23 LAB
ALBUMIN SERPL-MCNC: 4 G/DL (ref 3.4–5)
ALBUMIN/GLOB SERPL: 1 {RATIO} (ref 1–2)
ALP LIVER SERPL-CCNC: 79 U/L
ALT SERPL-CCNC: 93 U/L
ANION GAP SERPL CALC-SCNC: 6 MMOL/L (ref 0–18)
AST SERPL-CCNC: 52 U/L (ref 15–37)
BILIRUB SERPL-MCNC: 0.3 MG/DL (ref 0.1–2)
BUN BLD-MCNC: 14 MG/DL (ref 7–18)
BUN/CREAT SERPL: 18.2 (ref 10–20)
CALCIUM BLD-MCNC: 9.7 MG/DL (ref 8.5–10.1)
CHLORIDE SERPL-SCNC: 109 MMOL/L (ref 98–112)
CHOLEST SERPL-MCNC: 207 MG/DL (ref ?–200)
CO2 SERPL-SCNC: 28 MMOL/L (ref 21–32)
CREAT BLD-MCNC: 0.77 MG/DL
EST. AVERAGE GLUCOSE BLD GHB EST-MCNC: 105 MG/DL (ref 68–126)
FASTING PATIENT LIPID ANSWER: NO
FASTING STATUS PATIENT QL REPORTED: NO
GFR SERPLBLD BASED ON 1.73 SQ M-ARVRAT: 90 ML/MIN/1.73M2 (ref 60–?)
GLOBULIN PLAS-MCNC: 4 G/DL (ref 2.8–4.4)
GLUCOSE BLD-MCNC: 102 MG/DL (ref 70–99)
HBA1C MFR BLD: 5.3 % (ref ?–5.7)
HDLC SERPL-MCNC: 52 MG/DL (ref 40–59)
LDLC SERPL CALC-MCNC: 132 MG/DL (ref ?–100)
NONHDLC SERPL-MCNC: 155 MG/DL (ref ?–130)
OSMOLALITY SERPL CALC.SUM OF ELEC: 297 MOSM/KG (ref 275–295)
POTASSIUM SERPL-SCNC: 3.8 MMOL/L (ref 3.5–5.1)
PROT SERPL-MCNC: 8 G/DL (ref 6.4–8.2)
SODIUM SERPL-SCNC: 143 MMOL/L (ref 136–145)
TRIGL SERPL-MCNC: 131 MG/DL (ref 30–149)
VLDLC SERPL CALC-MCNC: 24 MG/DL (ref 0–30)

## 2023-06-23 PROCEDURE — 80053 COMPREHEN METABOLIC PANEL: CPT

## 2023-06-23 PROCEDURE — 72040 X-RAY EXAM NECK SPINE 2-3 VW: CPT | Performed by: INTERNAL MEDICINE

## 2023-06-23 PROCEDURE — 80061 LIPID PANEL: CPT

## 2023-06-23 PROCEDURE — 83036 HEMOGLOBIN GLYCOSYLATED A1C: CPT

## 2023-06-23 PROCEDURE — 72110 X-RAY EXAM L-2 SPINE 4/>VWS: CPT | Performed by: INTERNAL MEDICINE

## 2023-06-23 PROCEDURE — 36415 COLL VENOUS BLD VENIPUNCTURE: CPT

## 2023-06-26 ENCOUNTER — OFFICE VISIT (OUTPATIENT)
Dept: OPHTHALMOLOGY | Facility: CLINIC | Age: 58
End: 2023-06-26

## 2023-06-26 DIAGNOSIS — H25.13 AGE-RELATED NUCLEAR CATARACT OF BOTH EYES: ICD-10-CM

## 2023-06-26 DIAGNOSIS — H40.003 GLAUCOMA SUSPECT OF BOTH EYES: Primary | ICD-10-CM

## 2023-06-26 PROCEDURE — 92014 COMPRE OPH EXAM EST PT 1/>: CPT | Performed by: OPHTHALMOLOGY

## 2023-06-26 PROCEDURE — 92250 FUNDUS PHOTOGRAPHY W/I&R: CPT | Performed by: OPHTHALMOLOGY

## 2023-06-26 NOTE — PROGRESS NOTES
Jimi Sidhu is a 62year old female. HPI:     HPI    Pt in today for a complete eye exam and photos. Pt states vision is stable and denies any ocular issues. Last edited by Neville Galvan OT on 6/26/2023 10:29 AM.        Patient History:  Past Medical History:   Diagnosis Date    Acute pyelonephritis 1998    Hospitalized 2 weeks    Anesthesia complication     pt reports low BP    Colon polyps 2021    repeat CLN in 5 years    Disorder of liver 2020    abnormal LFTs, + AMA    Esophageal reflux     Meniere disease     SVT (supraventricular tachycardia) ([address] 1995    Status post ablation    Vestibular disorder     Vitamin B12 deficiency     weekly B12 therapy       Surgical History: Jimi Sidhu has a past surgical history that includes tubal ligation (Bilateral, 7769/6782/6671); colonoscopy (11/23/2011) (per YFN); colonoscopy (2011) (Dale Judge); colonoscopy (N/A, 4/14/2021) (Procedure: COLONOSCOPY;  Surgeon: Clary Guerrier MD;  Location: [facility]COPY); other (Abdominal fibroidectomy); and other surgical history (05/20/2022) [facility]). Family History   Problem Relation Age of Onset    Stroke Father 67        CVA    Hypertension Father     Stroke Mother 78        CVA    Hypertension Mother     Diabetes Sister         type 2 - half (P)    Colon Cancer Neg         close relative    Glaucoma Neg     Macular degeneration Neg        Social History:   Social History     Socioeconomic History    Marital status:    Occupational History    Occupation: LPN   Tobacco Use    Smoking status: Former     Packs/day: 0.10     Years: 20.00     Pack years: 2.00     Types: Cigarettes     Quit date: 2013     Years since quitting: 10.4    Smokeless tobacco: Never   Vaping Use    Vaping Use: Never used   Substance and Sexual Activity    Alcohol use:  Yes     Alcohol/week: 1.0 standard drink of alcohol     Types: 1 Standard drinks or equivalent per week     Comment: socially    Drug use: No    Sexual activity: Not Currently     Partners: Male     Birth control/protection: Tubal Ligation   Other Topics Concern    Caffeine Concern Yes     Comment: coffee, 1cup/day    Pt has a pacemaker No    Pt has a defibrillator No    Reaction to local anesthetic No       Medications:  Current Outpatient Medications   Medication Sig Dispense Refill    omeprazole 20 MG Oral Capsule Delayed Release Take 1 capsule (20 mg total) by mouth before breakfast.      Ciclopirox 8 % External Solution Apply 1 Application topically nightly. 6 mL 0    CLONAZEPAM 0.5 MG Oral Tab Take 1/2 (one-half) tablet by mouth once daily 15 tablet 0    cyclobenzaprine 5 MG Oral Tab Take 1 tablet (5 mg total) by mouth 2 (two) times daily as needed for Muscle spasms. 30 tablet 0       Allergies:  No Known Allergies    ROS:       PHYSICAL EXAM:     Base Eye Exam       Visual Acuity (Snellen - Linear)         Right Left    Dist sc 20/20 -2 20/20 -2    Near cc 20/20 20/20              Tonometry (Applanation, 10:26 AM)         Right Left    Pressure 15 15              Pachymetry (12/19/2015)         Right Left    Thickness 613/-5 636/-6              Pupils         Pupils    Right PERRL    Left PERRL              Visual Fields         Left Right     Full Full              Extraocular Movement         Right Left     Full, Ortho Full, Ortho              Neuro/Psych       Oriented x3:  Yes              Dilation       Both eyes: 1.0% Mydriacyl and 2.5% Viral Synephrine @ 10:27 AM                  Slit Lamp and Fundus Exam       Slit Lamp Exam         Right Left    Lids/Lashes Dermatochalasis, Blepharitis, Permanent eyeliner - Upper lid Dermatochalasis, Blepharitis, Permanent eyeliner - Upper lid    Conjunctiva/Sclera Nasal Pinguecula, cyst temp Nasal/temp pinguecula    Cornea  No Krukenberg's spindle  No Krukenberg's spindle    Anterior Chamber Deep and quiet Deep and quiet    Iris No transillumination defects No transillumination defects    Lens Trace Nuclear sclerosis Trace Nuclear sclerosis    Vitreous Clear Clear              Fundus Exam         Right Left    Disc Good rim, Temporal crescent Good rim, Temporal crescent    C/D Ratio 0.75 0.65    Macula RPE hypo superonasal to fovea Normal    Vessels Normal Normal    Periphery Normal Normal                  Refraction       Wearing Rx         Sphere Cylinder    Right +2.50 Sphere    Left +2.50 Sphere      Type: OTC reading only              Manifest Refraction    Pt declines refraction, happy with OTC readers                     ASSESSMENT/PLAN:     Diagnoses and Plan:     Glaucoma suspect of both eyes  Discussed with patient that she is a glaucoma suspect based on increased cupping of the optic nerves in both eyes. Retinal photos taken today to document optic nerves. Glaucoma diagnostic testing ordered. Will not start medication, but will continue to observe. Patient verbalized understanding. Age-related nuclear cataract of both eyes  Discussed early cataracts with patient. Told patient that cataracts are age appropriate and they are not surgical at this time. No treatment recommended at this time.       Orders Placed This Encounter      Fundus Photos - OU - Both Eyes      OCT, Optic Nerve - OU - Both Eyes      Treadwell Visual Field - OU - Both Eyes      Meds This Visit:  Requested Prescriptions      No prescriptions requested or ordered in this encounter        Follow up instructions:  Return for VF, OCT with no MD; if normal then 1 year EE.    6/26/2023  Scribed by: Renny Madrid MD

## 2023-06-26 NOTE — PATIENT INSTRUCTIONS
Glaucoma suspect of both eyes  Discussed with patient that she is a glaucoma suspect based on increased cupping of the optic nerves in both eyes. Retinal photos taken today to document optic nerves. Glaucoma diagnostic testing ordered. Will not start medication, but will continue to observe. Patient verbalized understanding. Age-related nuclear cataract of both eyes  Discussed early cataracts with patient. Told patient that cataracts are age appropriate and they are not surgical at this time. No treatment recommended at this time.

## 2023-06-27 ENCOUNTER — OFFICE VISIT (OUTPATIENT)
Dept: PHYSICAL MEDICINE AND REHAB | Facility: CLINIC | Age: 58
End: 2023-06-27
Payer: MEDICAID

## 2023-06-27 VITALS — OXYGEN SATURATION: 99 % | BODY MASS INDEX: 30.78 KG/M2 | HEIGHT: 61 IN | HEART RATE: 90 BPM | WEIGHT: 163 LBS

## 2023-06-27 DIAGNOSIS — M79.605 LOW BACK PAIN RADIATING TO LEFT LOWER EXTREMITY: Primary | ICD-10-CM

## 2023-06-27 DIAGNOSIS — M54.50 LOW BACK PAIN RADIATING TO LEFT LOWER EXTREMITY: Primary | ICD-10-CM

## 2023-06-27 DIAGNOSIS — M67.959 TENDINOPATHY OF GLUTEUS MEDIUS: ICD-10-CM

## 2023-06-27 DIAGNOSIS — M54.16 LEFT LUMBAR RADICULOPATHY: Primary | ICD-10-CM

## 2023-06-27 PROCEDURE — 99244 OFF/OP CNSLTJ NEW/EST MOD 40: CPT | Performed by: PHYSICAL MEDICINE & REHABILITATION

## 2023-06-27 PROCEDURE — 3008F BODY MASS INDEX DOCD: CPT | Performed by: PHYSICAL MEDICINE & REHABILITATION

## 2023-06-27 RX ORDER — TERBINAFINE HYDROCHLORIDE 250 MG/1
250 TABLET ORAL DAILY
COMMUNITY
Start: 2023-06-16 | End: 2023-06-30 | Stop reason: SINTOL

## 2023-06-27 RX ORDER — MELOXICAM 15 MG/1
15 TABLET ORAL DAILY
Qty: 14 TABLET | Refills: 0 | Status: SHIPPED | OUTPATIENT
Start: 2023-06-27 | End: 2023-07-11

## 2023-06-29 DIAGNOSIS — B35.1 ONYCHOMYCOSIS: ICD-10-CM

## 2023-06-30 ENCOUNTER — OFFICE VISIT (OUTPATIENT)
Dept: INTERNAL MEDICINE CLINIC | Facility: CLINIC | Age: 58
End: 2023-06-30

## 2023-06-30 ENCOUNTER — NURSE ONLY (OUTPATIENT)
Dept: OPHTHALMOLOGY | Facility: CLINIC | Age: 58
End: 2023-06-30

## 2023-06-30 VITALS
HEIGHT: 61 IN | DIASTOLIC BLOOD PRESSURE: 74 MMHG | HEART RATE: 86 BPM | BODY MASS INDEX: 30.58 KG/M2 | SYSTOLIC BLOOD PRESSURE: 110 MMHG | WEIGHT: 162 LBS | OXYGEN SATURATION: 98 %

## 2023-06-30 DIAGNOSIS — K76.0 FATTY INFILTRATION OF LIVER: ICD-10-CM

## 2023-06-30 DIAGNOSIS — E66.09 CLASS 1 OBESITY DUE TO EXCESS CALORIES WITH SERIOUS COMORBIDITY AND BODY MASS INDEX (BMI) OF 30.0 TO 30.9 IN ADULT: Primary | ICD-10-CM

## 2023-06-30 DIAGNOSIS — H40.003 GLAUCOMA SUSPECT OF BOTH EYES: ICD-10-CM

## 2023-06-30 DIAGNOSIS — Z12.4 CERVICAL CANCER SCREENING: ICD-10-CM

## 2023-06-30 PROCEDURE — 99214 OFFICE O/P EST MOD 30 MIN: CPT | Performed by: INTERNAL MEDICINE

## 2023-06-30 PROCEDURE — 3008F BODY MASS INDEX DOCD: CPT | Performed by: INTERNAL MEDICINE

## 2023-06-30 PROCEDURE — 3078F DIAST BP <80 MM HG: CPT | Performed by: INTERNAL MEDICINE

## 2023-06-30 PROCEDURE — 92133 CPTRZD OPH DX IMG PST SGM ON: CPT | Performed by: OPHTHALMOLOGY

## 2023-06-30 PROCEDURE — 92083 EXTENDED VISUAL FIELD XM: CPT | Performed by: OPHTHALMOLOGY

## 2023-06-30 PROCEDURE — 3074F SYST BP LT 130 MM HG: CPT | Performed by: INTERNAL MEDICINE

## 2023-07-05 NOTE — PROGRESS NOTES
Ap Tristan is a 62year old female. HPI:     HPI    Pt here for VF and OCT with no MD.  Last edited by iLn Magallon O.T. on 6/30/2023  9:48 AM.        Patient History:  Past Medical History:   Diagnosis Date    Acute pyelonephritis 1998    Hospitalized 2 weeks    Anesthesia complication     pt reports low BP    Colon polyps 2021    repeat CLN in 5 years    Disorder of liver 2020    abnormal LFTs, + AMA    Esophageal reflux     Meniere disease     SVT (supraventricular tachycardia) ([facility] [address] 1995    Status post ablation    Vestibular disorder     Vitamin B12 deficiency     weekly B12 therapy       Surgical History: Ap Tristan has a past surgical history that includes tubal ligation (Bilateral, 9738/2451/0770); colonoscopy (11/23/2011) (per NG); colonoscopy (2011) (Amy Muñoz); colonoscopy (N/A, 4/14/2021) (Procedure: COLONOSCOPY;  Surgeon: Forrest Dominguez MD;  Location: [address] [place] [address] ENDOSCOPY); other (Abdominal fibroidectomy); and other surgical history (05/20/2022) [facility]). Family History   Problem Relation Age of Onset    Stroke Father 67        CVA    Hypertension Father     Stroke Mother 78        CVA    Hypertension Mother     Diabetes Sister         type 2 - half (P)    Colon Cancer Neg         close relative    Glaucoma Neg     Macular degeneration Neg        Social History:   Social History     Socioeconomic History    Marital status:    Occupational History    Occupation: LPN   Tobacco Use    Smoking status: Former     Packs/day: 0.00     Years: 20.00     Pack years: 0.00     Types: Cigarettes     Quit date: 1/1/2013     Years since quitting: 10.5    Smokeless tobacco: Never   Vaping Use    Vaping Use: Never used   Substance and Sexual Activity    Alcohol use:  Yes     Alcohol/week: 1.0 standard drink of alcohol     Types: 1 Standard drinks or equivalent per week     Comment: socially    Drug use: No    Sexual activity: Not Currently     Partners: Male     Birth control/protection: Tubal Ligation   Other Topics Concern    Caffeine Concern Yes     Comment: coffee, 1cup/day    Exercise No    Pt has a pacemaker No    Pt has a defibrillator No    Reaction to local anesthetic No   Social History Narrative    The patient does not use an assistive device. .      The patient does live in a home with stairs. Medications:  Current Outpatient Medications   Medication Sig Dispense Refill    semaglutide-weight management 0.25 MG/0.5ML Subcutaneous Solution Auto-injector Inject 0.5 mL (0.25 mg total) into the skin once a week for 4 doses. 2 mL 0    Meloxicam (MOBIC) 15 MG Oral Tab Take 1 tablet (15 mg total) by mouth daily for 14 days. 14 tablet 0    omeprazole 20 MG Oral Capsule Delayed Release Take 1 capsule (20 mg total) by mouth before breakfast.      CLONAZEPAM 0.5 MG Oral Tab Take 1/2 (one-half) tablet by mouth once daily 15 tablet 0    cyclobenzaprine 5 MG Oral Tab Take 1 tablet (5 mg total) by mouth 2 (two) times daily as needed for Muscle spasms. 30 tablet 0       Allergies:  No Known Allergies    ROS:       PHYSICAL EXAM:   Not recorded          ASSESSMENT/PLAN:     Diagnoses and Plan:     Glaucoma suspect of both eyes  Normal visual field, both eyes. Normal OCT, both eyes. No orders of the defined types were placed in this encounter.       Meds This Visit:  Requested Prescriptions      No prescriptions requested or ordered in this encounter        Follow up instructions:  Return in about 1 year (around 6/30/2024) for Dilated exam.    7/5/2023  Scribed by: Brooke Rees MD

## 2023-07-10 ENCOUNTER — TELEPHONE (OUTPATIENT)
Dept: INTERNAL MEDICINE CLINIC | Facility: CLINIC | Age: 58
End: 2023-07-10

## 2023-07-11 ENCOUNTER — TELEPHONE (OUTPATIENT)
Dept: INTERNAL MEDICINE CLINIC | Facility: CLINIC | Age: 58
End: 2023-07-11

## 2023-07-11 ENCOUNTER — HOSPITAL ENCOUNTER (OUTPATIENT)
Dept: GENERAL RADIOLOGY | Age: 58
Discharge: HOME | End: 2023-07-11
Attending: PHYSICAL MEDICINE & REHABILITATION
Payer: MEDICAID

## 2023-07-11 DIAGNOSIS — M54.16 LEFT LUMBAR RADICULOPATHY: ICD-10-CM

## 2023-07-11 PROCEDURE — 73502 X-RAY EXAM HIP UNI 2-3 VIEWS: CPT | Performed by: PHYSICAL MEDICINE & REHABILITATION

## 2023-07-11 NOTE — TELEPHONE ENCOUNTER
Received fax from pharmacy requesting LILLIAN PATRICKGOVY 0.25 MG     QTY 4     Inject 0.5 ML into the skin once a week for 4 doses

## 2023-07-14 ENCOUNTER — OFFICE VISIT (OUTPATIENT)
Dept: OTOLARYNGOLOGY | Facility: CLINIC | Age: 58
End: 2023-07-14

## 2023-07-14 DIAGNOSIS — Z87.898 HX OF DIZZINESS: ICD-10-CM

## 2023-07-14 DIAGNOSIS — R25.3 FASCICULATION OF TONGUE: Primary | ICD-10-CM

## 2023-07-14 DIAGNOSIS — R42 DIZZINESS: ICD-10-CM

## 2023-07-14 PROCEDURE — 99213 OFFICE O/P EST LOW 20 MIN: CPT | Performed by: OTOLARYNGOLOGY

## 2023-07-14 RX ORDER — CLONAZEPAM 0.5 MG/1
0.25 TABLET ORAL DAILY
Qty: 30 TABLET | Refills: 2 | Status: SHIPPED | OUTPATIENT
Start: 2023-07-14

## 2023-07-20 ENCOUNTER — OFFICE VISIT (OUTPATIENT)
Dept: PHYSICAL MEDICINE AND REHAB | Facility: CLINIC | Age: 58
End: 2023-07-20
Payer: MEDICAID

## 2023-07-20 VITALS
WEIGHT: 162 LBS | BODY MASS INDEX: 30.58 KG/M2 | HEART RATE: 56 BPM | HEIGHT: 61 IN | OXYGEN SATURATION: 99 % | DIASTOLIC BLOOD PRESSURE: 72 MMHG | SYSTOLIC BLOOD PRESSURE: 124 MMHG

## 2023-07-20 DIAGNOSIS — M67.959 TENDINOPATHY OF GLUTEUS MEDIUS: ICD-10-CM

## 2023-07-20 DIAGNOSIS — M54.16 LEFT LUMBAR RADICULOPATHY: Primary | ICD-10-CM

## 2023-07-20 PROCEDURE — 3074F SYST BP LT 130 MM HG: CPT | Performed by: PHYSICAL MEDICINE & REHABILITATION

## 2023-07-20 PROCEDURE — 99214 OFFICE O/P EST MOD 30 MIN: CPT | Performed by: PHYSICAL MEDICINE & REHABILITATION

## 2023-07-20 PROCEDURE — 3008F BODY MASS INDEX DOCD: CPT | Performed by: PHYSICAL MEDICINE & REHABILITATION

## 2023-07-20 PROCEDURE — 3078F DIAST BP <80 MM HG: CPT | Performed by: PHYSICAL MEDICINE & REHABILITATION

## 2023-07-20 NOTE — ADDENDUM NOTE
Addended by: Aimee Gilford on: 10/6/2021 11:13 AM     Modules accepted: Orders Continue to orally hydrate with clear liquids. Eat a well-balanced diet. Follow-up with your family physician within 1 week. Return to the ER for worsening or worrisome symptoms.

## 2023-07-21 DIAGNOSIS — B35.1 ONYCHOMYCOSIS: ICD-10-CM

## 2023-07-21 RX ORDER — SEMAGLUTIDE 0.68 MG/ML
0.5 INJECTION, SOLUTION SUBCUTANEOUS WEEKLY
Qty: 3 ML | Refills: 0 | Status: SHIPPED | OUTPATIENT
Start: 2023-07-21

## 2023-07-21 NOTE — TELEPHONE ENCOUNTER
Pharmacy states \"wegovy\" is not covered, preferred alternatives are ozempic or trulicity. Rx written as semaglutide which is ozempic but not running through insurance. New rx pended for signature to include \"ozempic\", please sign off if appropriate.

## 2023-07-28 ENCOUNTER — OFFICE VISIT (OUTPATIENT)
Dept: PHYSICAL THERAPY | Age: 58
End: 2023-07-28
Attending: PHYSICAL MEDICINE & REHABILITATION
Payer: MEDICAID

## 2023-07-28 ENCOUNTER — TELEPHONE (OUTPATIENT)
Dept: INTERNAL MEDICINE CLINIC | Facility: CLINIC | Age: 58
End: 2023-07-28

## 2023-07-28 DIAGNOSIS — M54.16 LEFT LUMBAR RADICULOPATHY: Primary | ICD-10-CM

## 2023-07-28 PROCEDURE — 97110 THERAPEUTIC EXERCISES: CPT

## 2023-07-28 PROCEDURE — 97161 PT EVAL LOW COMPLEX 20 MIN: CPT

## 2023-07-28 PROCEDURE — 97530 THERAPEUTIC ACTIVITIES: CPT

## 2023-07-28 NOTE — TELEPHONE ENCOUNTER
[employer]/login  Key: [id number]      Current Outpatient Medications:     semaglutide (OZEMPIC, 0.25 OR 0.5 MG/DOSE,) 2 MG/3ML Subcutaneous Solution Pen-injector, Inject 0.5 mg into the skin once a week.  For 4 doses, Disp: 3 mL, Rfl: 0
See TE 7/24, this has been denied
28-Aug-2017

## 2023-08-08 ENCOUNTER — APPOINTMENT (OUTPATIENT)
Dept: PHYSICAL THERAPY | Age: 58
End: 2023-08-08
Attending: PHYSICAL MEDICINE & REHABILITATION
Payer: MEDICAID

## 2023-08-18 ENCOUNTER — OFFICE VISIT (OUTPATIENT)
Dept: PHYSICAL THERAPY | Age: 58
End: 2023-08-18
Attending: PHYSICAL MEDICINE & REHABILITATION
Payer: MEDICAID

## 2023-08-18 DIAGNOSIS — M54.16 LEFT LUMBAR RADICULOPATHY: Primary | ICD-10-CM

## 2023-08-18 PROCEDURE — 97112 NEUROMUSCULAR REEDUCATION: CPT

## 2023-08-18 PROCEDURE — 97110 THERAPEUTIC EXERCISES: CPT

## 2023-08-18 NOTE — PROGRESS NOTES
Diagnosis:   Left lumbar radiculopathy       Referring Provider: Ana Luisa Liz  Date of Evaluation:    7/28/23    Precautions:  None Next MD visit:   none scheduled  Date of Surgery: n/a   Insurance Primary/Secondary: BLUE CROSS MEDICAID / N/A     # Auth Visits: 10            Subjective: Patient reports no pain this am. She has been doing her HEP and felt ok after her initial evaluation. Pain: 0/10      Objective:     Observation/Posture: Lumbar lordosis and bilateral genu valgus. Neuro Screen: Has numbness and tingling at the right toes. Trunk AROM: (* denotes performed with pain)  Flexion: 50%  Extension: 50%  Sidebending: R 50%; L 50%  Rotation: R 50%; L 50%     Accessory motion: hypomobile with central and uni. Pas at the lumbar spine; +2 grades. Palpation: Pain at the left lb and lumbar spine. Strength: (* denotes performed with pain)  LE   Hip flexion (L2): R 4+/5; L 4/5  Hip abduction: R 4+/5; L 4/5  Hip Extension: R 4+/5; L 4/5            Hip ER: R 4+/5; L 4/5  Hip IR: R 4+/5; L 4/5  Knee Flexion: R 4+/5; L 4/5            Knee extension (L3): R 4+/5; L 4/5            DF (L4): R 4+/5; L 4/5  Great Toe Ext (L5): R 4+/5, L 4/5  PF (S1): R 4+/5; L 4/5      Flexibility:   LE   Hip Flexor: RTight, L Tighter  Hamstrings: R Tight; L Tighter  Piriformis: R Tight; L Tighter  Quads: R Tight; L Tighter  Gastroc-soleus: R Tight; L Tighter      Special tests: Positive left slump and slr tests. Negative for right slump and slr tests. Gait: pt ambulates on level ground with antalgia and lumbar lordosis . Balance: SLS R 20, L 3 seconds      Assessment: Patient presents with left glut. Pain and tenderness during palpation and self mobilization. The patient also presents with left le tightness during her manual stretching. She also tolerate extension based activities well. Goals: (to be met in 10 visits)   . Pt will improve transversus abdominis recruitment to perform proper isometric contraction without requiring verbal or tactile cuing to promote advancement of therex   Pt will demonstrate good understanding of proper posture and body mechanics to decrease pain and improve spinal safety   Pt will improve lumbar spine AROM flexion to touch her toes to allow increase ease with bending forward to don shoes   Pt will report improved symptom centralization and absence of radicular symptoms for 3 consecutive days to improve function with ADL   Pt will have decreased paraspinal mm tension to tolerate standing >10 minutes for work and home activities   Pt will demonstrate improved core strength to be able to perform exercises with <1/10 pain   Pt will be independent and compliant with comprehensive HEP to maintain progress achieved in PT     Plan: Plan to work on stretching, trunk rom and core exercises. Date: 8/18/2023  TX#: 2/10 Date:                 TX#: 3/ Date:                 TX#: 4/ Date:                 TX#: 5/ Date: Tx#: 6/   Ther. Ex.:  Nu-Step 8' L3  Left HS St. With pump 2x30\"  Left Glut. And pfs st. 2x30\"xea. Left Trunk Rolls 2x20  Tennis Ball Self Mob. 3'  PPU 2x10  Standing Hip Abduction 20xea. Neuro Re-ed.:  5. Bridges 2x20  6. Prone Hip Extensions 20xea. 7. Prone Swim 20xea.            HEP: Reviewed her HEP originally provided    Charges: 2TE and 1 Neuro       Total Timed Treatment: 45 min  Total Treatment Time: 45 min

## 2023-08-22 ENCOUNTER — APPOINTMENT (OUTPATIENT)
Dept: PHYSICAL THERAPY | Age: 58
End: 2023-08-22
Attending: PHYSICAL MEDICINE & REHABILITATION
Payer: MEDICAID

## 2023-08-24 ENCOUNTER — OFFICE VISIT (OUTPATIENT)
Dept: PHYSICAL MEDICINE AND REHAB | Facility: CLINIC | Age: 58
End: 2023-08-24
Payer: MEDICAID

## 2023-08-24 VITALS — WEIGHT: 162.19 LBS | SYSTOLIC BLOOD PRESSURE: 124 MMHG | DIASTOLIC BLOOD PRESSURE: 68 MMHG | BODY MASS INDEX: 31 KG/M2

## 2023-08-24 DIAGNOSIS — M67.959 TENDINOPATHY OF GLUTEUS MEDIUS: ICD-10-CM

## 2023-08-24 DIAGNOSIS — M54.16 LEFT LUMBAR RADICULOPATHY: Primary | ICD-10-CM

## 2023-08-24 PROCEDURE — 99213 OFFICE O/P EST LOW 20 MIN: CPT | Performed by: PHYSICAL MEDICINE & REHABILITATION

## 2023-08-24 PROCEDURE — 3074F SYST BP LT 130 MM HG: CPT | Performed by: PHYSICAL MEDICINE & REHABILITATION

## 2023-08-24 PROCEDURE — 3078F DIAST BP <80 MM HG: CPT | Performed by: PHYSICAL MEDICINE & REHABILITATION

## 2023-08-24 NOTE — PATIENT INSTRUCTIONS
-Follow up after completed PT course  -Finish PT and continue home exercises  -Ice/Heat as tolerated  -Medication only as needed

## 2023-08-29 ENCOUNTER — APPOINTMENT (OUTPATIENT)
Dept: PHYSICAL THERAPY | Age: 58
End: 2023-08-29
Attending: PHYSICAL MEDICINE & REHABILITATION
Payer: MEDICAID

## 2023-09-05 ENCOUNTER — APPOINTMENT (OUTPATIENT)
Dept: PHYSICAL THERAPY | Age: 58
End: 2023-09-05
Attending: PHYSICAL MEDICINE & REHABILITATION
Payer: MEDICAID

## 2023-09-08 ENCOUNTER — TELEPHONE (OUTPATIENT)
Dept: PHYSICAL THERAPY | Facility: HOSPITAL | Age: 58
End: 2023-09-08

## 2023-09-12 ENCOUNTER — APPOINTMENT (OUTPATIENT)
Dept: PHYSICAL THERAPY | Age: 58
End: 2023-09-12
Attending: PHYSICAL MEDICINE & REHABILITATION
Payer: MEDICAID

## 2023-09-15 ENCOUNTER — OFFICE VISIT (OUTPATIENT)
Dept: OBGYN CLINIC | Facility: CLINIC | Age: 58
End: 2023-09-15

## 2023-09-15 VITALS
BODY MASS INDEX: 31 KG/M2 | DIASTOLIC BLOOD PRESSURE: 77 MMHG | WEIGHT: 162.63 LBS | SYSTOLIC BLOOD PRESSURE: 112 MMHG | HEART RATE: 81 BPM

## 2023-09-15 DIAGNOSIS — N76.3 CHRONIC VULVITIS: ICD-10-CM

## 2023-09-15 DIAGNOSIS — Z01.419 ENCOUNTER FOR GYNECOLOGICAL EXAMINATION WITHOUT ABNORMAL FINDING: Primary | ICD-10-CM

## 2023-09-15 DIAGNOSIS — Z12.4 SCREENING FOR CERVICAL CANCER: ICD-10-CM

## 2023-09-15 DIAGNOSIS — Z12.4 SCREENING FOR MALIGNANT NEOPLASM OF CERVIX: ICD-10-CM

## 2023-09-15 DIAGNOSIS — Z12.31 SCREENING MAMMOGRAM FOR BREAST CANCER: ICD-10-CM

## 2023-09-15 PROCEDURE — 3078F DIAST BP <80 MM HG: CPT | Performed by: OBSTETRICS & GYNECOLOGY

## 2023-09-15 PROCEDURE — 99396 PREV VISIT EST AGE 40-64: CPT | Performed by: OBSTETRICS & GYNECOLOGY

## 2023-09-15 PROCEDURE — 3074F SYST BP LT 130 MM HG: CPT | Performed by: OBSTETRICS & GYNECOLOGY

## 2023-09-15 RX ORDER — NYSTATIN AND TRIAMCINOLONE ACETONIDE 100000; 1 [USP'U]/G; MG/G
1 OINTMENT TOPICAL 2 TIMES DAILY
Qty: 30 G | Refills: 0 | Status: CANCELLED | OUTPATIENT
Start: 2023-09-15

## 2023-09-15 RX ORDER — TRIAMCINOLONE ACETONIDE 1 MG/G
1 OINTMENT TOPICAL 2 TIMES DAILY
Qty: 15 G | Refills: 0 | Status: SHIPPED | OUTPATIENT
Start: 2023-09-15

## 2023-09-15 RX ORDER — NYSTATIN 100000 U/G
1 OINTMENT TOPICAL 2 TIMES DAILY
Qty: 15 G | Refills: 0 | Status: SHIPPED | OUTPATIENT
Start: 2023-09-15

## 2023-09-15 NOTE — PROGRESS NOTES
Subjective:   Patient ID: Derrek Valenzuela is a 62year old female. HPI  G5 [id number] and . No further bleeding since our hysteroscopy procedure in May 2022. No current relationship. No new family or personal medical issues. She has family stressors with her son. She reports a possible vulvar follicular abscess 6 months ago and no recurrence. Remainder of ROS significant for HERNAN using a single pad daily. History/Other:   Review of Systems   Constitutional:  Negative for appetite change, fatigue and unexpected weight change. Eyes:  Negative for visual disturbance. Respiratory:  Negative for cough and shortness of breath. Cardiovascular:  Negative for chest pain, palpitations and leg swelling. Gastrointestinal:  Negative for abdominal distention, abdominal pain, blood in stool, constipation and diarrhea. Genitourinary:  Negative for dysuria, frequency, pelvic pain and urgency. Musculoskeletal:  Negative for arthralgias and myalgias. Skin:  Negative for rash. Neurological:  Negative for weakness, numbness and headaches. Psychiatric/Behavioral:  Negative for dysphoric mood. The patient is not nervous/anxious. Current Outpatient Medications   Medication Sig Dispense Refill    clonazePAM 0.5 MG Oral Tab Take 0.5 tablets (0.25 mg total) by mouth daily. 30 tablet 2    omeprazole 20 MG Oral Capsule Delayed Release Take 1 capsule (20 mg total) by mouth before breakfast.      cyclobenzaprine 5 MG Oral Tab Take 1 tablet (5 mg total) by mouth 2 (two) times daily as needed for Muscle spasms. 30 tablet 0    semaglutide (OZEMPIC, 0.25 OR 0.5 MG/DOSE,) 2 MG/3ML Subcutaneous Solution Pen-injector Inject 0.5 mg into the skin once a week. For 4 doses (Patient not taking: Reported on 9/15/2023) 3 mL 0     Allergies:No Known Allergies    Objective:   Physical Exam  Constitutional:       General: She is not in acute distress. Appearance: She is well-developed. She is not diaphoretic.    Neck: Thyroid: No thyromegaly. Cardiovascular:      Rate and Rhythm: Normal rate and regular rhythm. Heart sounds: Normal heart sounds. No murmur heard. Pulmonary:      Effort: Pulmonary effort is normal.      Breath sounds: Normal breath sounds. No wheezing or rales. Chest:   Breasts:     Right: Normal. No mass, nipple discharge, skin change or tenderness. Left: Normal. No mass, nipple discharge, skin change or tenderness. Comments:     Abdominal:      General: There is no distension. Palpations: Abdomen is soft. There is no mass. Tenderness: There is no abdominal tenderness. There is no guarding or rebound. Genitourinary:     Labia:         Right: No rash or lesion. Left: No rash or lesion. Vagina: Normal. No vaginal discharge. Cervix: No cervical motion tenderness or discharge. Uterus: Not enlarged and not tender. Adnexa:         Right: No mass, tenderness or fullness. Left: No mass, tenderness or fullness. Comments: Symmetric vulvar inflammation with slight desquamation. Also atrophy c/w age. Musculoskeletal:         General: No tenderness. Cervical back: Neck supple. Lymphadenopathy:      Cervical: No cervical adenopathy. Upper Body:      Right upper body: No supraclavicular, axillary or pectoral adenopathy. Left upper body: No supraclavicular, axillary or pectoral adenopathy. Neurological:      Mental Status: She is alert. Assessment & Plan:   Encounter for gynecological examination without abnormal finding  (primary encounter diagnosis)  Screening mammogram for breast cancer  Screening for malignant neoplasm of cervix  Chronic vulvitis  We discussed short course of Mycolog x 7-10d and observe. Vaginal estrogen would be likely second and long term choice. No orders of the defined types were placed in this encounter.       Meds This Visit:  Requested Prescriptions     Pending Prescriptions Disp Refills nystatin-triamcinolone 100,000-0.1 Units/g-% External Ointment 30 g 0     Sig: Apply 1 Application topically 2 (two) times daily.        Imaging & Referrals:  [facility] DOTTIE 2D+3D SCREENING BILAT (CPT=77067/46654)

## 2023-09-18 LAB — HPV I/H RISK 1 DNA SPEC QL NAA+PROBE: NEGATIVE

## 2023-09-19 ENCOUNTER — APPOINTMENT (OUTPATIENT)
Dept: PHYSICAL THERAPY | Age: 58
End: 2023-09-19
Attending: PHYSICAL MEDICINE & REHABILITATION
Payer: MEDICAID

## 2023-09-20 ENCOUNTER — OFFICE VISIT (OUTPATIENT)
Dept: INTERNAL MEDICINE CLINIC | Facility: CLINIC | Age: 58
End: 2023-09-20

## 2023-09-20 ENCOUNTER — LAB ENCOUNTER (OUTPATIENT)
Dept: LAB | Age: 58
End: 2023-09-20
Attending: INTERNAL MEDICINE
Payer: MEDICAID

## 2023-09-20 VITALS
HEIGHT: 61 IN | BODY MASS INDEX: 30.4 KG/M2 | DIASTOLIC BLOOD PRESSURE: 77 MMHG | SYSTOLIC BLOOD PRESSURE: 114 MMHG | HEART RATE: 81 BPM | WEIGHT: 161 LBS

## 2023-09-20 DIAGNOSIS — L98.9 SKIN LESION: ICD-10-CM

## 2023-09-20 DIAGNOSIS — E55.9 VITAMIN D DEFICIENCY: ICD-10-CM

## 2023-09-20 DIAGNOSIS — Z00.00 ANNUAL PHYSICAL EXAM: Primary | ICD-10-CM

## 2023-09-20 LAB
ALBUMIN SERPL-MCNC: 4 G/DL (ref 3.4–5)
ALBUMIN/GLOB SERPL: 1 {RATIO} (ref 1–2)
ALP LIVER SERPL-CCNC: 77 U/L
ALT SERPL-CCNC: 69 U/L
ANION GAP SERPL CALC-SCNC: 6 MMOL/L (ref 0–18)
AST SERPL-CCNC: 34 U/L (ref 15–37)
BILIRUB SERPL-MCNC: 0.3 MG/DL (ref 0.1–2)
BUN BLD-MCNC: 21 MG/DL (ref 7–18)
CALCIUM BLD-MCNC: 9.3 MG/DL (ref 8.5–10.1)
CHLORIDE SERPL-SCNC: 109 MMOL/L (ref 98–112)
CHOLEST SERPL-MCNC: 180 MG/DL (ref ?–200)
CO2 SERPL-SCNC: 24 MMOL/L (ref 21–32)
CREAT BLD-MCNC: 0.65 MG/DL
EGFRCR SERPLBLD CKD-EPI 2021: 102 ML/MIN/1.73M2 (ref 60–?)
ERYTHROCYTE [DISTWIDTH] IN BLOOD BY AUTOMATED COUNT: 12.6 %
FASTING PATIENT LIPID ANSWER: NO
FASTING STATUS PATIENT QL REPORTED: NO
GLOBULIN PLAS-MCNC: 3.9 G/DL (ref 2.8–4.4)
GLUCOSE BLD-MCNC: 106 MG/DL (ref 70–99)
HCT VFR BLD AUTO: 41.6 %
HDLC SERPL-MCNC: 45 MG/DL (ref 40–59)
HGB BLD-MCNC: 13.9 G/DL
LDLC SERPL CALC-MCNC: 105 MG/DL (ref ?–100)
MCH RBC QN AUTO: 28.6 PG (ref 26–34)
MCHC RBC AUTO-ENTMCNC: 33.4 G/DL (ref 31–37)
MCV RBC AUTO: 85.6 FL
NONHDLC SERPL-MCNC: 135 MG/DL (ref ?–130)
OSMOLALITY SERPL CALC.SUM OF ELEC: 291 MOSM/KG (ref 275–295)
PLATELET # BLD AUTO: 262 10(3)UL (ref 150–450)
POTASSIUM SERPL-SCNC: 4.1 MMOL/L (ref 3.5–5.1)
PROT SERPL-MCNC: 7.9 G/DL (ref 6.4–8.2)
RBC # BLD AUTO: 4.86 X10(6)UL
SODIUM SERPL-SCNC: 139 MMOL/L (ref 136–145)
TRIGL SERPL-MCNC: 174 MG/DL (ref 30–149)
TSI SER-ACNC: 1.57 MIU/ML (ref 0.36–3.74)
VLDLC SERPL CALC-MCNC: 29 MG/DL (ref 0–30)
WBC # BLD AUTO: 7.4 X10(3) UL (ref 4–11)

## 2023-09-20 PROCEDURE — 3008F BODY MASS INDEX DOCD: CPT | Performed by: INTERNAL MEDICINE

## 2023-09-20 PROCEDURE — 85027 COMPLETE CBC AUTOMATED: CPT | Performed by: INTERNAL MEDICINE

## 2023-09-20 PROCEDURE — 83036 HEMOGLOBIN GLYCOSYLATED A1C: CPT | Performed by: INTERNAL MEDICINE

## 2023-09-20 PROCEDURE — 3078F DIAST BP <80 MM HG: CPT | Performed by: INTERNAL MEDICINE

## 2023-09-20 PROCEDURE — 84443 ASSAY THYROID STIM HORMONE: CPT | Performed by: INTERNAL MEDICINE

## 2023-09-20 PROCEDURE — 80061 LIPID PANEL: CPT | Performed by: INTERNAL MEDICINE

## 2023-09-20 PROCEDURE — 36415 COLL VENOUS BLD VENIPUNCTURE: CPT | Performed by: INTERNAL MEDICINE

## 2023-09-20 PROCEDURE — 80053 COMPREHEN METABOLIC PANEL: CPT | Performed by: INTERNAL MEDICINE

## 2023-09-20 PROCEDURE — 82306 VITAMIN D 25 HYDROXY: CPT | Performed by: INTERNAL MEDICINE

## 2023-09-20 PROCEDURE — 3074F SYST BP LT 130 MM HG: CPT | Performed by: INTERNAL MEDICINE

## 2023-09-20 PROCEDURE — 99396 PREV VISIT EST AGE 40-64: CPT | Performed by: INTERNAL MEDICINE

## 2023-09-20 NOTE — PATIENT INSTRUCTIONS
For your Ozempic and the typical titration is 0.25 mg weekly for 4 weeks, then 0.5 mg weekly for 2 weeks, then you move up to 1 mg weekly

## 2023-09-21 LAB
EST. AVERAGE GLUCOSE BLD GHB EST-MCNC: 111 MG/DL (ref 68–126)
HBA1C MFR BLD: 5.5 % (ref ?–5.7)
VIT D+METAB SERPL-MCNC: 18.2 NG/ML (ref 30–100)

## 2023-09-25 RX ORDER — OMEPRAZOLE 20 MG/1
20 CAPSULE, DELAYED RELEASE ORAL
Qty: 90 CAPSULE | Refills: 0 | Status: SHIPPED | OUTPATIENT
Start: 2023-09-25 | End: 2023-12-24

## 2023-09-25 NOTE — TELEPHONE ENCOUNTER
Requested Prescriptions     Pending Prescriptions Disp Refills    OMEPRAZOLE 20 MG Oral Capsule Delayed Release [Pharmacy Med Name: Omeprazole 20 MG Oral Capsule Delayed Release] 30 capsule 0     Sig: TAKE 1 CAPSULE BY MOUTH IN THE MORNING BEFORE BREAKFAST       LOV 6/03/2022  LR  6/03/2023    em

## 2023-09-27 PROBLEM — N95.0 POSTMENOPAUSAL BLEEDING: Status: RESOLVED | Noted: 2022-04-25 | Resolved: 2023-09-27

## 2023-10-04 NOTE — TELEPHONE ENCOUNTER
Added vitamin D level since she is going for blood tests this weekend If you are a smoker, it is important for your health to stop smoking. Please be aware that second hand smoke is also harmful.

## 2023-10-18 ENCOUNTER — OFFICE VISIT (OUTPATIENT)
Dept: INTERNAL MEDICINE CLINIC | Facility: CLINIC | Age: 58
End: 2023-10-18

## 2023-10-18 VITALS
SYSTOLIC BLOOD PRESSURE: 120 MMHG | BODY MASS INDEX: 30.21 KG/M2 | WEIGHT: 160 LBS | HEIGHT: 61 IN | OXYGEN SATURATION: 98 % | DIASTOLIC BLOOD PRESSURE: 78 MMHG | HEART RATE: 96 BPM

## 2023-10-18 DIAGNOSIS — K75.81 NASH (NONALCOHOLIC STEATOHEPATITIS): ICD-10-CM

## 2023-10-18 DIAGNOSIS — R53.83 FATIGUE, UNSPECIFIED TYPE: ICD-10-CM

## 2023-10-18 DIAGNOSIS — E66.09 CLASS 1 OBESITY DUE TO EXCESS CALORIES WITHOUT SERIOUS COMORBIDITY WITH BODY MASS INDEX (BMI) OF 30.0 TO 30.9 IN ADULT: Primary | ICD-10-CM

## 2023-10-18 DIAGNOSIS — R74.8 ELEVATED LIVER ENZYMES: ICD-10-CM

## 2023-10-18 PROBLEM — E66.811 CLASS 1 OBESITY DUE TO EXCESS CALORIES WITHOUT SERIOUS COMORBIDITY WITH BODY MASS INDEX (BMI) OF 30.0 TO 30.9 IN ADULT: Status: ACTIVE | Noted: 2023-10-18

## 2023-10-18 PROCEDURE — 99214 OFFICE O/P EST MOD 30 MIN: CPT | Performed by: INTERNAL MEDICINE

## 2023-10-18 PROCEDURE — 3008F BODY MASS INDEX DOCD: CPT | Performed by: INTERNAL MEDICINE

## 2023-10-18 PROCEDURE — 3078F DIAST BP <80 MM HG: CPT | Performed by: INTERNAL MEDICINE

## 2023-10-18 PROCEDURE — 3074F SYST BP LT 130 MM HG: CPT | Performed by: INTERNAL MEDICINE

## 2023-10-18 RX ORDER — SEMAGLUTIDE 0.68 MG/ML
0.5 INJECTION, SOLUTION SUBCUTANEOUS WEEKLY
Qty: 3 ML | Refills: 0 | Status: SHIPPED | OUTPATIENT
Start: 2023-10-18

## 2023-10-19 ENCOUNTER — TELEPHONE (OUTPATIENT)
Dept: INTERNAL MEDICINE CLINIC | Facility: CLINIC | Age: 58
End: 2023-10-19

## 2023-10-19 NOTE — TELEPHONE ENCOUNTER
Denied in July and again in October    Product not covered by this plan. Prior Authorization not available.   Case ID: [id number]      Payer: [facility]    [phone]   Product not covered for this health plan/disease state/medication as submitted. Product not covered by this plan for this diagnosis. For a FDA label approved diagnosis, please resubmit with an ICD 10 code or submit via other methods. - Prescriber details have been updated to match the prescriber directory.   View History

## 2023-10-19 NOTE — TELEPHONE ENCOUNTER
Denial noted, patient aware to pay out of pocket or call insurance for alternatives. No further action nor prior authorization to be done. Thanks.

## 2023-10-19 NOTE — TELEPHONE ENCOUNTER
Per pharmacy PA is needed for the following medication.      semaglutide (OZEMPIC, 0.25 OR 0.5 MG/DOSE,) 2 MG/3ML Subcutaneous Solution Pen-injector, Inject 0.5 mg into the skin once a week. For 4 doses, Disp: 3 mL, Rfl: 0    Key: [id number]  Patient last name: Ambrose  : 1965.

## 2023-11-10 ENCOUNTER — TELEPHONE (OUTPATIENT)
Facility: CLINIC | Age: 58
End: 2023-11-10

## 2023-11-10 NOTE — TELEPHONE ENCOUNTER
Dr. Samara Russell,    I spoke to patient, states for the past 3 days she has been having worsening symptoms of heartburn, bloating, burping and feeling of food getting stuck in her throat.     2 days ago she was woken up by pressure in her chest that was relieved by burping many times. States it is really loud and she hasn't stopped burping since. She takes omeprazole 40mg daily but doesn't feel it is helping. Took baking soda with lime which help a little but symptoms returned. She notes starting ozempic recently. She does not want to go to UC/ED, states she was told to call you if she had any new/changing symptoms. Please advise, thank you.

## 2023-11-10 NOTE — TELEPHONE ENCOUNTER
Patient calling states has been having bloating and trouble swallowing, nothing available with Dr Tash Grossman until 3/2024. Please call.

## 2023-11-10 NOTE — TELEPHONE ENCOUNTER
Spoke to patient and reviewed note below. Patient verbalized understanding. Appt confirmed, location/date/time details provided.       Your appointments       Date & Time Appointment Department [facility])    Nov 16, 2023 10:00 AM CST Follow Up Visit with Ashley Alvarez, [address] [facility], [address] ([place])

## 2023-11-10 NOTE — TELEPHONE ENCOUNTER
Noted, likely the result of her ozempic medication as it causes gastric delay and worsening reflux. GI Staff:   Please have her STOP ozempic, if she took an injection, it will last 1 week  She should increase to omeprazole 20mg PO twice a day  Avoid caffeine, chocolate, peppermint, and alcohol.  Also avoid lying down flat or in a recumbent position for 3 hours after a meal.   Okay to take OTC gaviscon to help reduce sx, 1-2x a day prn  I can see her on 11/16 at 10AM at [facility], its been over a year since I have seen her

## 2023-11-16 ENCOUNTER — OFFICE VISIT (OUTPATIENT)
Facility: CLINIC | Age: 58
End: 2023-11-16

## 2023-11-16 VITALS
HEART RATE: 89 BPM | DIASTOLIC BLOOD PRESSURE: 76 MMHG | WEIGHT: 151 LBS | SYSTOLIC BLOOD PRESSURE: 113 MMHG | BODY MASS INDEX: 28.51 KG/M2 | HEIGHT: 61 IN

## 2023-11-16 DIAGNOSIS — K59.04 CHRONIC IDIOPATHIC CONSTIPATION: ICD-10-CM

## 2023-11-16 DIAGNOSIS — K21.9 GASTROESOPHAGEAL REFLUX DISEASE, UNSPECIFIED WHETHER ESOPHAGITIS PRESENT: ICD-10-CM

## 2023-11-16 DIAGNOSIS — R14.0 ABDOMINAL BLOATING: Primary | ICD-10-CM

## 2023-11-16 PROCEDURE — 99214 OFFICE O/P EST MOD 30 MIN: CPT | Performed by: INTERNAL MEDICINE

## 2023-11-16 PROCEDURE — 3074F SYST BP LT 130 MM HG: CPT | Performed by: INTERNAL MEDICINE

## 2023-11-16 PROCEDURE — 3078F DIAST BP <80 MM HG: CPT | Performed by: INTERNAL MEDICINE

## 2023-11-16 PROCEDURE — 3008F BODY MASS INDEX DOCD: CPT | Performed by: INTERNAL MEDICINE

## 2023-11-16 RX ORDER — OMEPRAZOLE 40 MG/1
40 CAPSULE, DELAYED RELEASE ORAL DAILY
Qty: 90 CAPSULE | Refills: 0 | Status: SHIPPED | OUTPATIENT
Start: 2023-11-16 | End: 2024-02-14

## 2023-11-16 NOTE — PROGRESS NOTES
[address] [facility]                                                                                                      Clinic Follow-up Visit    Chief Complaint   Patient presents with    Follow - Up    Gas         Subjective/HPI:   Derrek Valenzuela is a 62year old year-old female with history of GERD/esophagitis, GOEL (sees hepatology, hx of liver biopsy, no AIH), hx of recurrent diverticulitis, who presents for evaluation of bloating and GERD. Since last visit;  -started ozempic medication  -started having worsening nausea, bloating and GERD  -no melena or hematochezia  -no cp/sob  -on omeprazole  -no fever/chills  -no sig ab pain   -started ozempic 2.5 in late sep 2023, then she is supposed to start 5mcg/week  -she lost 10lbs  -she is finding improvement on higher dose PPI and gaivscon  -she is getting ozempic from \"[facility]\"    CT 5/9/22  CONCLUSION:   1. There are findings compatible with acute diverticulitis in the left lower quadrant in the junction of the descending and sigmoid colon. There is no evidence of bowel perforation. No organized measurable fluid collection. 2.  Uterine fibroid within the right-anterior aspect the uterine body as seen on prior pelvic ultrasound from 4/22/22. 3.  Small hiatal hernia. INITIAL VISIT  -Patient in the last year has had mild increases in ALT/AST, otherwise bili/alk phos wnl  -she has mild constipation issues, and some diarrhea   -Some mild RUQ pain as well, non radiating dull achy  -Prior US showing fatty liver  -Patient currently denies any GI symptoms of nausea, vomiting, dyspepsia, dysphagia, hematemesis change in bowel habits, thin stools, hematochezia, or melena. Additionally there is no weight loss and no reported history of chest pain or shortness of breath.   -Does drink 2 beers on occasion  -Some use of herbal weight loss seeds  -No family hx of liver cirrhosis     Prior endoscopies:  2021- CLN - 2 polyps; repeat CLN in 5years    EGD in 2018   1. The etiology of dysphagia (mild, intermittent) is due to mild reflux esophagitis in the setting of a small hiatal hernia. No stricture or mass seen. 2. Normal appearing stomach and duodenum. 2011-- EGD/CLN: Diverticulosis, esophagitis (mild), hiatal hernia. No polyps. Repeat CLN in 10 years. Soc:  -NO smoking  -NO Etoh         Wt Readings from Last 6 Encounters:   11/16/23 151 lb (68.5 kg)   10/18/23 160 lb (72.6 kg)   09/20/23 161 lb (73 kg)   09/15/23 162 lb 9.6 oz (73.8 kg)   08/24/23 162 lb 3.2 oz (73.6 kg)   07/20/23 162 lb (73.5 kg)        History, Medications, Allergies, ROS:      Past Medical History:   Diagnosis Date    Acute pyelonephritis 1998    Hospitalized 2 weeks    Anesthesia complication     pt reports low BP    Colon polyps 2021    repeat CLN in 5 years    Disorder of liver 2020    abnormal LFTs, + AMA    Esophageal reflux     Meniere disease     SVT (supraventricular tachycardia) 1995    Status post ablation    Vestibular disorder     Vitamin B12 deficiency     weekly B12 therapy      Past Surgical History:   Procedure Laterality Date    COLONOSCOPY  11/23/2011    per [facility]    COLONOSCOPY  2011        COLONOSCOPY N/A 4/14/2021    Procedure: COLONOSCOPY;  Surgeon: Tomas Briggs MD;  Location: [address] [place] [address] ENDOSCOPY    OTHER      Abdominal fibroidectomy    OTHER SURGICAL HISTORY  05/20/2022    DNC    TUBAL LIGATION Bilateral [id number]      Family History   Problem Relation Age of Onset    Stroke Father 67        CVA    Hypertension Father     Stroke Mother 78        CVA    Hypertension Mother     Diabetes Sister         type 2 - half (P)    Colon Cancer Neg         close relative    Glaucoma Neg     Macular degeneration Neg       Social History:   Social History     Socioeconomic History    Marital status:     Occupational History    Occupation: LPN Tobacco Use    Smoking status: Former     Packs/day: 0.00     Years: 20.00     Additional pack years: 0.00     Total pack years: 0.00     Types: Cigarettes     Quit date: 1/1/2013     Years since quitting: 10.8    Smokeless tobacco: Never   Vaping Use    Vaping Use: Never used   Substance and Sexual Activity    Alcohol use: Yes     Alcohol/week: 1.0 standard drink of alcohol     Types: 1 Standard drinks or equivalent per week     Comment: socially    Drug use: No    Sexual activity: Not Currently     Partners: Male     Birth control/protection: Tubal Ligation   Other Topics Concern    Caffeine Concern Yes     Comment: coffee, 1cup/day    Exercise No    Pt has a pacemaker No    Pt has a defibrillator No    Reaction to local anesthetic No   Social History Narrative    The patient does not use an assistive device. .      The patient does live in a home with stairs. Medications (Active prior to today's visit):  Current Outpatient Medications   Medication Sig Dispense Refill    semaglutide (OZEMPIC, 0.25 OR 0.5 MG/DOSE,) 2 MG/3ML Subcutaneous Solution Pen-injector Inject 0.5 mg into the skin once a week. For 4 doses 3 mL 0    omeprazole 20 MG Oral Capsule Delayed Release Take 1 capsule (20 mg total) by mouth before breakfast. 90 capsule 0    nystatin 100,000 Units/g External Ointment Apply 1 Application topically 2 (two) times daily. Use with triamcinolone. 15 g 0    triamcinolone 0.1 % External Ointment Apply 1 Application topically 2 (two) times daily. Use with nystatin. 15 g 0    clonazePAM 0.5 MG Oral Tab Take 0.5 tablets (0.25 mg total) by mouth daily. 30 tablet 2    cyclobenzaprine 5 MG Oral Tab Take 1 tablet (5 mg total) by mouth 2 (two) times daily as needed for Muscle spasms.  30 tablet 0       Allergies:  No Known Allergies    ROS:   CONSTITUTIONAL:  negative for fevers, chills  EYES:  negative for change in vision  RESPIRATORY:  negative for  shortness of breath  CARDIOVASCULAR:  negative for  chest pain  GASTROINTESTINAL:  see HPI  GENITOURINARY:  negative for dysuria  INTEGUMENT/BREAST:  SKIN:  negative for  rash  ALLERGIC/IMMUNOLOGIC:  negative for hay fever  ENDOCRINE:  negative for cold intolerance and heat intolerance  MUSCULOSKELETAL:  negative for  joint stiffness and joint swelling  BEHAVIOR/PSYCH:  negative for depressed mood    PHYSICAL EXAM:   Blood pressure 113/76, pulse 89, height 5' 1\" (1.549 m), weight 151 lb (68.5 kg), not currently breastfeeding. Gen- Patient appears comfortable and in no acute discomfort  HEENT: the sclera appears anicteric, oropharynx clear, mucus membranes appear moist  CV- regular rate and rhythm, the extremities are warm and well perfused   Lung- Moves air well; No labored breathing  Abdomen- soft,mild LLQ ttp,no guarding, non-distended, no abnormal bowel sounds noted, no masses are palpated  Skin- No jaundice  Ext: no cyanosis, clubbing or edema is evident. Neuro- Alert and oriented x4, and patient is having movement of all 4 extremities     Labs/Imaging:     Patient's labs and imaging were reviewed and discussed with patient today. .  ASSESSMENT/PLAN:   Christina Cornejo is a 62year old year-old female with history of GERD/esophagitis, GOEL (sees hepatology, hx of liver biopsy, no AIH), hx of recurrent diverticulitis, who presents for evaluation of bloating and GERD. #Abnormal LFTs -GOEL related. F1 on elastopgraphy. +AMA blood test. However, liver biopsy not indicative of AIH. Sees hepatology. On Vit E for GOEL. #Screening -Last in 2021, repeat CLN in 5 years due to polyps    #Hx of diverticulitis- if recurrent attacks--> will need referral for surgery for possible sigmoidectomy. I would highly recommend she take Metamucil regularly. Okay for colace. #GERD/bloating - underlying GERD issues worsened on ozempic--->likely as a result of delayed gastric emptying. Increase to omeprazole for time being.  Continue ozempic but at lower dose (0.25mg/week). Recommend:  STOP ozempic, if she took an injection, it will last 1 week  She should increase to omeprazole 20mg PO twice a day  Avoid caffeine, chocolate, peppermint, and alcohol. Also avoid lying down flat or in a recumbent position for 3 hours after a meal.   Okay to take OTC gaviscon to help reduce sx, 1-2x a day prn    Copy: Dr. Kaylyn Hoskins    Orders This Visit:  No orders of the defined types were placed in this encounter. Meds This Visit:  Requested Prescriptions      No prescriptions requested or ordered in this encounter       Imaging & Referrals:  None     1/29/2021    ÁNGEL Marin MD  Pager: [phone]        This note was partially prepared using [employer] [facility] alike voice recognition dictation software. As a result, errors may occur. When identified, these errors have been corrected.  While every attempt is made to correct errors during dictation, discrepancies may still exist.

## 2023-11-16 NOTE — PATIENT INSTRUCTIONS
Okay to resume Ozempic if feeling better, but stick to low dose   Avoid constipation make sure you take metamucil regularly, atleast 3x a week. OKay for colace.   Continue omeprazole 40mg/day  Continue as needed gaviscon

## 2024-01-10 ENCOUNTER — LAB ENCOUNTER (OUTPATIENT)
Dept: LAB | Age: 59
End: 2024-01-10
Attending: INTERNAL MEDICINE
Payer: MEDICAID

## 2024-01-10 ENCOUNTER — OFFICE VISIT (OUTPATIENT)
Facility: LOCATION | Age: 59
End: 2024-01-10

## 2024-01-10 VITALS
HEART RATE: 75 BPM | HEIGHT: 63 IN | SYSTOLIC BLOOD PRESSURE: 109 MMHG | OXYGEN SATURATION: 98 % | BODY MASS INDEX: 27 KG/M2 | DIASTOLIC BLOOD PRESSURE: 75 MMHG

## 2024-01-10 DIAGNOSIS — E66.09 CLASS 1 OBESITY DUE TO EXCESS CALORIES WITHOUT SERIOUS COMORBIDITY WITH BODY MASS INDEX (BMI) OF 30.0 TO 30.9 IN ADULT: ICD-10-CM

## 2024-01-10 DIAGNOSIS — K75.81 NASH (NONALCOHOLIC STEATOHEPATITIS): ICD-10-CM

## 2024-01-10 DIAGNOSIS — K75.81 NASH (NONALCOHOLIC STEATOHEPATITIS): Primary | ICD-10-CM

## 2024-01-10 DIAGNOSIS — R74.8 ELEVATED LIVER ENZYMES: ICD-10-CM

## 2024-01-10 DIAGNOSIS — R30.0 DYSURIA: ICD-10-CM

## 2024-01-10 LAB
ALBUMIN SERPL-MCNC: 5.1 G/DL (ref 3.2–4.8)
ALBUMIN/GLOB SERPL: 1.7 {RATIO} (ref 1–2)
ALP LIVER SERPL-CCNC: 81 U/L
ALT SERPL-CCNC: 47 U/L
ANION GAP SERPL CALC-SCNC: 7 MMOL/L (ref 0–18)
AST SERPL-CCNC: 32 U/L (ref ?–34)
BILIRUB SERPL-MCNC: 0.4 MG/DL (ref 0.3–1.2)
BILIRUB UR QL: NEGATIVE
BUN BLD-MCNC: 13 MG/DL (ref 9–23)
BUN/CREAT SERPL: 18.6 (ref 10–20)
CALCIUM BLD-MCNC: 10.3 MG/DL (ref 8.7–10.4)
CHLORIDE SERPL-SCNC: 108 MMOL/L (ref 98–112)
CLARITY UR: CLEAR
CO2 SERPL-SCNC: 27 MMOL/L (ref 21–32)
COLOR UR: COLORLESS
CREAT BLD-MCNC: 0.7 MG/DL
EGFRCR SERPLBLD CKD-EPI 2021: 100 ML/MIN/1.73M2 (ref 60–?)
FASTING STATUS PATIENT QL REPORTED: YES
GLOBULIN PLAS-MCNC: 3 G/DL (ref 2.8–4.4)
GLUCOSE BLD-MCNC: 86 MG/DL (ref 70–99)
GLUCOSE UR-MCNC: NORMAL MG/DL
HGB UR QL STRIP.AUTO: NEGATIVE
KETONES UR-MCNC: NEGATIVE MG/DL
LEUKOCYTE ESTERASE UR QL STRIP.AUTO: NEGATIVE
NITRITE UR QL STRIP.AUTO: NEGATIVE
OSMOLALITY SERPL CALC.SUM OF ELEC: 293 MOSM/KG (ref 275–295)
PH UR: 5 [PH] (ref 5–8)
POTASSIUM SERPL-SCNC: 4.5 MMOL/L (ref 3.5–5.1)
PROT SERPL-MCNC: 8.1 G/DL (ref 5.7–8.2)
PROT UR-MCNC: NEGATIVE MG/DL
SODIUM SERPL-SCNC: 142 MMOL/L (ref 136–145)
SP GR UR STRIP: 1 (ref 1–1.03)
UROBILINOGEN UR STRIP-ACNC: NORMAL

## 2024-01-10 PROCEDURE — 3078F DIAST BP <80 MM HG: CPT | Performed by: INTERNAL MEDICINE

## 2024-01-10 PROCEDURE — 3074F SYST BP LT 130 MM HG: CPT | Performed by: INTERNAL MEDICINE

## 2024-01-10 PROCEDURE — 99214 OFFICE O/P EST MOD 30 MIN: CPT | Performed by: INTERNAL MEDICINE

## 2024-01-10 PROCEDURE — 36415 COLL VENOUS BLD VENIPUNCTURE: CPT

## 2024-01-10 PROCEDURE — 80053 COMPREHEN METABOLIC PANEL: CPT

## 2024-01-10 PROCEDURE — 81003 URINALYSIS AUTO W/O SCOPE: CPT

## 2024-01-10 RX ORDER — SEMAGLUTIDE 0.68 MG/ML
0.5 INJECTION, SOLUTION SUBCUTANEOUS WEEKLY
Qty: 3 ML | Refills: 1 | Status: SHIPPED | OUTPATIENT
Start: 2024-01-10

## 2024-01-10 NOTE — PROGRESS NOTES
INTERNAL MEDICINE OFFICE NOTE     Patient ID: Cristine Boggs is a 58 year old female.  Today's Date: 01/10/24  Chief Complaint: Back Pain (Pt here for Lower Back Pain)    Back Pain      50-year-old female presents for back pain and Ozempic follow-up.  She read on the Internet that taking Ozempic can mess with her kidneys, she has been on 0.25 mg getting this from a compounding pharmacy out-of-pocket, she has not noticed any other symptoms in terms of her back pain, there is no trauma, no sleep injury, there is no radicular symptoms.  She states this is more of an internal discomfort and she became concerned it may be her kidneys from the Ozempic.  She does not have any urinary complaints.      Vitals:    01/10/24 1112   BP: 109/75   Pulse: 75   SpO2: 98%   Height: 5' 3\" (1.6 m)     body mass index is 26.75 kg/m².  BP Readings from Last 3 Encounters:   01/10/24 109/75   11/16/23 113/76   10/18/23 120/78     The 10-year ASCVD risk score (Hansel ROBLEDO, et al., 2019) is: 2.1%    Values used to calculate the score:      Age: 58 years      Sex: Female      Is Non- : No      Diabetic: No      Tobacco smoker: No      Systolic Blood Pressure: 109 mmHg      Is BP treated: No      HDL Cholesterol: 45 mg/dL      Total Cholesterol: 180 mg/dL  Medications reviewed:  Current Outpatient Medications   Medication Sig Dispense Refill    semaglutide (OZEMPIC, 0.25 OR 0.5 MG/DOSE,) 2 MG/3ML Subcutaneous Solution Pen-injector Inject 0.5 mg into the skin once a week. For 4 doses 3 mL 1    Omeprazole 40 MG Oral Capsule Delayed Release Take 1 capsule (40 mg total) by mouth daily. 90 capsule 0    nystatin 100,000 Units/g External Ointment Apply 1 Application topically 2 (two) times daily. Use with triamcinolone. 15 g 0    triamcinolone 0.1 % External Ointment Apply 1 Application topically 2 (two) times daily. Use with nystatin. 15 g 0    clonazePAM 0.5 MG Oral Tab Take 0.5 tablets (0.25 mg total) by mouth  daily. 30 tablet 2    cyclobenzaprine 5 MG Oral Tab Take 1 tablet (5 mg total) by mouth 2 (two) times daily as needed for Muscle spasms. 30 tablet 0         Assessment & Plan    1. GOEL (nonalcoholic steatohepatitis) (Primary)  -     Comp Metabolic Panel (14); Future; Expected date: 01/10/2024  -     Ozempic (0.25 or 0.5 MG/DOSE); Inject 0.5 mg into the skin once a week. For 4 doses  Dispense: 3 mL; Refill: 1  2. Class 1 obesity due to excess calories without serious comorbidity with body mass index (BMI) of 30.0 to 30.9 in adult  -     Ozempic (0.25 or 0.5 MG/DOSE); Inject 0.5 mg into the skin once a week. For 4 doses  Dispense: 3 mL; Refill: 1  3. Elevated liver enzymes  -     Comp Metabolic Panel (14); Future; Expected date: 01/10/2024  -     Ozempic (0.25 or 0.5 MG/DOSE); Inject 0.5 mg into the skin once a week. For 4 doses  Dispense: 3 mL; Refill: 1  4. Dysuria  -     Urinalysis with Culture Reflex; Future; Expected date: 01/10/2024  Plan  We will check CMP to evaluate for renal function along with liver function, she will submit urinalysis to prove there is no UTI causing her symptoms.  We discussed that Ozempic may be causing some of the symptoms but this is more likely related to musculoskeletal, I have asked her to do some stretching heat and massage and see how she improves, further recommendations depending on lab results.    Follow Up: No follow-ups on file..         Objective: Results:   Physical Exam  Vitals and nursing note reviewed.   Constitutional:       General: She is not in acute distress.     Appearance: Normal appearance.   HENT:      Head: Normocephalic.      Right Ear: External ear normal.      Left Ear: External ear normal.   Eyes:      Extraocular Movements: Extraocular movements intact.      Conjunctiva/sclera: Conjunctivae normal.   Pulmonary:      Effort: Pulmonary effort is normal.   Abdominal:      Tenderness: There is no right CVA tenderness or left CVA tenderness.   Musculoskeletal:          General: Normal range of motion.      Cervical back: Normal range of motion and neck supple.      Lumbar back: Normal.   Skin:     Coloration: Skin is not jaundiced.   Neurological:      General: No focal deficit present.      Mental Status: She is alert and oriented to person, place, and time. Mental status is at baseline.   Psychiatric:         Mood and Affect: Mood normal.         Behavior: Behavior normal.        Reviewed:    Patient Active Problem List    Diagnosis    Abdominal bloating    Gastroesophageal reflux disease    Chronic idiopathic constipation    Fatigue    Class 1 obesity due to excess calories without serious comorbidity with body mass index (BMI) of 30.0 to 30.9 in adult    GOEL (nonalcoholic steatohepatitis)    Elevated liver enzymes    Chronic vulvitis    Left low back pain    Endometrial polyp    Cervical polyp    Thickened endometrium    Age-related nuclear cataract of both eyes    Diverticulitis of large intestine without perforation or abscess without bleeding    Abnormal LFTs    Polyp of colon    Fasciculation of tongue    Dizziness    Hives    Hordeolum externum of left lower eyelid    Myopia of both eyes with astigmatism and presbyopia    Tinea pedis of both feet    Bladder prolapse, female, acquired    Lipoma of face    History of seizure    Headache disorder    Gastroesophageal reflux disease with esophagitis    Eye pain, bilateral    Fibroid uterus     Per US 3/17      Right ovarian cyst     Per US 3      Bilateral presbyopia    Glaucoma suspect of both eyes    Anxiety disorder      No Known Allergies     Social History     Socioeconomic History    Marital status:    Occupational History    Occupation: LPN   Tobacco Use    Smoking status: Former     Packs/day: 0.00     Years: 20.00     Additional pack years: 0.00     Total pack years: 0.00     Types: Cigarettes     Quit date: 2013     Years since quittin.0    Smokeless tobacco: Never   Vaping Use    Vaping  Use: Never used   Substance and Sexual Activity    Alcohol use: Yes     Alcohol/week: 1.0 standard drink of alcohol     Types: 1 Standard drinks or equivalent per week     Comment: socially    Drug use: No    Sexual activity: Not Currently     Partners: Male     Birth control/protection: Tubal Ligation   Other Topics Concern    Caffeine Concern Yes     Comment: coffee, 1cup/day    Exercise No    Pt has a pacemaker No    Pt has a defibrillator No    Reaction to local anesthetic No   Social History Narrative    The patient does not use an assistive device..      The patient does live in a home with stairs.      Review of Systems   Musculoskeletal:  Positive for back pain.     All other systems negative unless otherwise stated in ROS or HPI above.       Tylor Smith MD  Internal Medicine       Call office with any questions or seek emergency care if necessary.   Patient understands and agrees to follow directions and advice.      ----------------------------------------- PATIENT INSTRUCTIONS-----------------------------------------   .    There are no Patient Instructions on file for this visit.

## 2024-01-11 ENCOUNTER — TELEPHONE (OUTPATIENT)
Facility: LOCATION | Age: 59
End: 2024-01-11

## 2024-01-11 DIAGNOSIS — E66.09 CLASS 1 OBESITY DUE TO EXCESS CALORIES WITH SERIOUS COMORBIDITY AND BODY MASS INDEX (BMI) OF 30.0 TO 30.9 IN ADULT: Primary | ICD-10-CM

## 2024-01-11 NOTE — TELEPHONE ENCOUNTER
Prior Auth      semaglutide (OZEMPIC, 0.25 OR 0.5 MG/DOSE,) 2 MG/3ML Subcutaneous Solution Pen-injector, Inject 0.5 mg into the skin once a week. For 4 doses, Disp: 3 mL, Rfl: 1    Key:  [id number]

## 2024-01-11 NOTE — TELEPHONE ENCOUNTER
Dr. Smith, medication is only covered for patient's with diabetes per patient's insurance plan.     Information regarding your request  Product not covered for this health plan/disease state/medication as submitted. Product not covered by this plan for this diagnosis. For a FDA label approved diagnosis, please resubmit with an ICD 10 code or submit via other methods.

## 2024-01-12 NOTE — TELEPHONE ENCOUNTER
Denial noted, patient has been made aware at visit to pay out of pocket or call insurance for alternatives. No further action. No prior authorization to be done. Thanks.

## 2024-01-24 ENCOUNTER — LAB ENCOUNTER (OUTPATIENT)
Dept: LAB | Age: 59
End: 2024-01-24
Attending: OBSTETRICS & GYNECOLOGY
Payer: MEDICAID

## 2024-01-24 ENCOUNTER — TELEPHONE (OUTPATIENT)
Dept: OBGYN CLINIC | Facility: CLINIC | Age: 59
End: 2024-01-24

## 2024-01-24 DIAGNOSIS — R05.1 ACUTE COUGH: Primary | ICD-10-CM

## 2024-01-24 DIAGNOSIS — R05.1 ACUTE COUGH: ICD-10-CM

## 2024-01-24 PROCEDURE — 87637 SARSCOV2&INF A&B&RSV AMP PRB: CPT

## 2024-01-25 LAB
FLUAV + FLUBV RNA SPEC NAA+PROBE: NOT DETECTED
FLUAV + FLUBV RNA SPEC NAA+PROBE: NOT DETECTED
RSV RNA SPEC NAA+PROBE: NOT DETECTED
SARS-COV-2 RNA RESP QL NAA+PROBE: NOT DETECTED

## 2024-01-31 DIAGNOSIS — Z87.898 HX OF DIZZINESS: ICD-10-CM

## 2024-02-01 RX ORDER — OMEPRAZOLE 20 MG/1
20 CAPSULE, DELAYED RELEASE ORAL
Qty: 90 CAPSULE | Refills: 0 | Status: SHIPPED | OUTPATIENT
Start: 2024-02-01

## 2024-02-01 NOTE — TELEPHONE ENCOUNTER
Requested Prescriptions     Pending Prescriptions Disp Refills    OMEPRAZOLE 20 MG Oral Capsule Delayed Release [Pharmacy Med Name: Omeprazole 20 MG Oral Capsule Delayed Release] 90 capsule 0     Sig: TAKE 1 CAPSULE BY MOUTH BEFORE BREAKFAST       LOV  11/16/2023  LR  11/16/2023    em

## 2024-02-03 ENCOUNTER — HOSPITAL ENCOUNTER (OUTPATIENT)
Dept: MAMMOGRAPHY | Age: 59
Discharge: HOME | End: 2024-02-03
Attending: OBSTETRICS & GYNECOLOGY
Payer: MEDICAID

## 2024-02-03 DIAGNOSIS — Z12.31 SCREENING MAMMOGRAM FOR BREAST CANCER: ICD-10-CM

## 2024-02-03 PROCEDURE — 77067 SCR MAMMO BI INCL CAD: CPT | Performed by: OBSTETRICS & GYNECOLOGY

## 2024-02-03 PROCEDURE — 77063 BREAST TOMOSYNTHESIS BI: CPT | Performed by: OBSTETRICS & GYNECOLOGY

## 2024-02-06 NOTE — TELEPHONE ENCOUNTER
This refill request is being sent to the provider for the following reason:  []Patient has not had an appointment within the past 12 months but has made an appointment on: ___  [x]Medication is not within protocol  []Patient did not complete follow up recommendations  []Other: ___

## 2024-02-12 ENCOUNTER — PATIENT MESSAGE (OUTPATIENT)
Dept: OTOLARYNGOLOGY | Facility: CLINIC | Age: 59
End: 2024-02-12

## 2024-02-12 NOTE — TELEPHONE ENCOUNTER
From: Cristine Boggs  To: Nagi Baker  Sent: 2/12/2024 9:25 AM CST  Subject: Medication     Hi, Dr Baker  Can you toya authorize my medication refill   Thank you so much

## 2024-02-15 ENCOUNTER — HOSPITAL ENCOUNTER (EMERGENCY)
Facility: HOSPITAL | Age: 59
Discharge: HOME | End: 2024-02-15
Attending: STUDENT IN AN ORGANIZED HEALTH CARE EDUCATION/TRAINING PROGRAM
Payer: MEDICAID

## 2024-02-15 ENCOUNTER — APPOINTMENT (OUTPATIENT)
Dept: MRI IMAGING | Facility: HOSPITAL | Age: 59
End: 2024-02-15
Attending: STUDENT IN AN ORGANIZED HEALTH CARE EDUCATION/TRAINING PROGRAM
Payer: MEDICAID

## 2024-02-15 VITALS
RESPIRATION RATE: 21 BRPM | DIASTOLIC BLOOD PRESSURE: 73 MMHG | HEART RATE: 77 BPM | BODY MASS INDEX: 27.11 KG/M2 | HEIGHT: 63 IN | WEIGHT: 153 LBS | SYSTOLIC BLOOD PRESSURE: 102 MMHG | OXYGEN SATURATION: 98 % | TEMPERATURE: 99 F

## 2024-02-15 DIAGNOSIS — R55 SYNCOPE AND COLLAPSE: Primary | ICD-10-CM

## 2024-02-15 LAB
ANION GAP SERPL CALC-SCNC: 6 MMOL/L (ref 0–18)
BASOPHILS # BLD AUTO: 0.03 X10(3) UL (ref 0–0.2)
BASOPHILS NFR BLD AUTO: 0.4 %
BUN BLD-MCNC: 15 MG/DL (ref 9–23)
BUN/CREAT SERPL: 20.3 (ref 10–20)
CALCIUM BLD-MCNC: 9.5 MG/DL (ref 8.7–10.4)
CHLORIDE SERPL-SCNC: 108 MMOL/L (ref 98–112)
CO2 SERPL-SCNC: 27 MMOL/L (ref 21–32)
CREAT BLD-MCNC: 0.74 MG/DL
DEPRECATED RDW RBC AUTO: 38 FL (ref 35.1–46.3)
EGFRCR SERPLBLD CKD-EPI 2021: 94 ML/MIN/1.73M2 (ref 60–?)
EOSINOPHIL # BLD AUTO: 0.19 X10(3) UL (ref 0–0.7)
EOSINOPHIL NFR BLD AUTO: 2.6 %
ERYTHROCYTE [DISTWIDTH] IN BLOOD BY AUTOMATED COUNT: 12.4 % (ref 11–15)
FLUAV + FLUBV RNA SPEC NAA+PROBE: NEGATIVE
FLUAV + FLUBV RNA SPEC NAA+PROBE: NEGATIVE
GLUCOSE BLD-MCNC: 70 MG/DL (ref 70–99)
GLUCOSE BLDC GLUCOMTR-MCNC: 70 MG/DL (ref 70–99)
HCT VFR BLD AUTO: 42.7 %
HGB BLD-MCNC: 14.1 G/DL
IMM GRANULOCYTES # BLD AUTO: 0.02 X10(3) UL (ref 0–1)
IMM GRANULOCYTES NFR BLD: 0.3 %
INR BLD: 0.97 (ref 0.8–1.2)
LYMPHOCYTES # BLD AUTO: 2.31 X10(3) UL (ref 1–4)
LYMPHOCYTES NFR BLD AUTO: 32.1 %
MCH RBC QN AUTO: 28.1 PG (ref 26–34)
MCHC RBC AUTO-ENTMCNC: 33 G/DL (ref 31–37)
MCV RBC AUTO: 85.2 FL
MONOCYTES # BLD AUTO: 0.56 X10(3) UL (ref 0.1–1)
MONOCYTES NFR BLD AUTO: 7.8 %
NEUTROPHILS # BLD AUTO: 4.09 X10 (3) UL (ref 1.5–7.7)
NEUTROPHILS # BLD AUTO: 4.09 X10(3) UL (ref 1.5–7.7)
NEUTROPHILS NFR BLD AUTO: 56.8 %
OSMOLALITY SERPL CALC.SUM OF ELEC: 291 MOSM/KG (ref 275–295)
PLATELET # BLD AUTO: 270 10(3)UL (ref 150–450)
POTASSIUM SERPL-SCNC: 4.3 MMOL/L (ref 3.5–5.1)
PROTHROMBIN TIME: 13.5 SECONDS (ref 11.6–14.8)
RBC # BLD AUTO: 5.01 X10(6)UL
RSV RNA SPEC NAA+PROBE: NEGATIVE
SARS-COV-2 RNA RESP QL NAA+PROBE: NOT DETECTED
SODIUM SERPL-SCNC: 141 MMOL/L (ref 136–145)
TROPONIN I SERPL HS-MCNC: <3 NG/L
WBC # BLD AUTO: 7.2 X10(3) UL (ref 4–11)

## 2024-02-15 PROCEDURE — 0241U SARS-COV-2/FLU A AND B/RSV BY PCR (GENEXPERT): CPT | Performed by: STUDENT IN AN ORGANIZED HEALTH CARE EDUCATION/TRAINING PROGRAM

## 2024-02-15 PROCEDURE — 99285 EMERGENCY DEPT VISIT HI MDM: CPT

## 2024-02-15 PROCEDURE — 84484 ASSAY OF TROPONIN QUANT: CPT | Performed by: STUDENT IN AN ORGANIZED HEALTH CARE EDUCATION/TRAINING PROGRAM

## 2024-02-15 PROCEDURE — 82962 GLUCOSE BLOOD TEST: CPT

## 2024-02-15 PROCEDURE — 85025 COMPLETE CBC W/AUTO DIFF WBC: CPT | Performed by: STUDENT IN AN ORGANIZED HEALTH CARE EDUCATION/TRAINING PROGRAM

## 2024-02-15 PROCEDURE — 96374 THER/PROPH/DIAG INJ IV PUSH: CPT

## 2024-02-15 PROCEDURE — 85610 PROTHROMBIN TIME: CPT | Performed by: STUDENT IN AN ORGANIZED HEALTH CARE EDUCATION/TRAINING PROGRAM

## 2024-02-15 PROCEDURE — 80048 BASIC METABOLIC PNL TOTAL CA: CPT | Performed by: STUDENT IN AN ORGANIZED HEALTH CARE EDUCATION/TRAINING PROGRAM

## 2024-02-15 PROCEDURE — 70551 MRI BRAIN STEM W/O DYE: CPT | Performed by: STUDENT IN AN ORGANIZED HEALTH CARE EDUCATION/TRAINING PROGRAM

## 2024-02-15 PROCEDURE — 93005 ELECTROCARDIOGRAM TRACING: CPT

## 2024-02-15 PROCEDURE — 96361 HYDRATE IV INFUSION ADD-ON: CPT

## 2024-02-15 RX ORDER — DIPHENHYDRAMINE HYDROCHLORIDE 50 MG/ML
25 INJECTION INTRAMUSCULAR; INTRAVENOUS ONCE
Status: COMPLETED | OUTPATIENT
Start: 2024-02-15 | End: 2024-02-15

## 2024-02-15 RX ORDER — MECLIZINE HYDROCHLORIDE 25 MG/1
25 TABLET ORAL ONCE
Status: COMPLETED | OUTPATIENT
Start: 2024-02-15 | End: 2024-02-15

## 2024-02-15 RX ORDER — DIAZEPAM 5 MG/ML
2.5 INJECTION, SOLUTION INTRAMUSCULAR; INTRAVENOUS ONCE
Status: COMPLETED | OUTPATIENT
Start: 2024-02-15 | End: 2024-02-15

## 2024-02-15 NOTE — ED INITIAL ASSESSMENT (HPI)
Pt to ED via EMS from feeling of lightheadedness, sharp headaches, and pt reports she had a syncopal episode today. She reports she took her blood pressure before her syncopal episode and noted her blood pressure to be in the 80s systolic. Pt has generalized weakness, no focal neuro deficits noted on exam.

## 2024-02-16 ENCOUNTER — OFFICE VISIT (OUTPATIENT)
Dept: OTOLARYNGOLOGY | Facility: CLINIC | Age: 59
End: 2024-02-16

## 2024-02-16 VITALS
HEIGHT: 63 IN | WEIGHT: 153 LBS | BODY MASS INDEX: 27.11 KG/M2 | HEART RATE: 80 BPM | SYSTOLIC BLOOD PRESSURE: 102 MMHG | DIASTOLIC BLOOD PRESSURE: 67 MMHG

## 2024-02-16 DIAGNOSIS — Z87.898 HX OF DIZZINESS: ICD-10-CM

## 2024-02-16 DIAGNOSIS — R25.3 FASCICULATION OF TONGUE: Primary | ICD-10-CM

## 2024-02-16 LAB
ATRIAL RATE: 82 BPM
P AXIS: 48 DEGREES
P-R INTERVAL: 156 MS
Q-T INTERVAL: 386 MS
QRS DURATION: 88 MS
QTC CALCULATION (BEZET): 450 MS
R AXIS: 51 DEGREES
T AXIS: 47 DEGREES
VENTRICULAR RATE: 82 BPM

## 2024-02-16 PROCEDURE — 99213 OFFICE O/P EST LOW 20 MIN: CPT | Performed by: OTOLARYNGOLOGY

## 2024-02-16 RX ORDER — CLONAZEPAM 0.5 MG/1
0.25 TABLET ORAL DAILY
Qty: 15 TABLET | Refills: 0 | OUTPATIENT
Start: 2024-02-16

## 2024-02-16 RX ORDER — CLONAZEPAM 0.5 MG/1
0.25 TABLET ORAL DAILY
Qty: 30 TABLET | Refills: 2 | Status: SHIPPED | OUTPATIENT
Start: 2024-02-16

## 2024-02-16 NOTE — DISCHARGE INSTRUCTIONS
Thank you for seeking care at [facility] Emergency Department.  You have been seen and evaluated for syncope.   We reviewed the results from your visit in the emergency department.   Please read the instructions provided.   If given prescriptions, take as instructed.     Remember, your care process does not end after your visit today. Please follow-up with your doctor within 1-2 days for a follow-up check to ensure you are  improving, to see if you need any further evaluation/testing, or to evaluate for any alternate diagnoses.    Please return to the emergency department if you develop chest pain, shortness of breath, dizziness, pass out, or if you develop any other new or concerning symptoms as these could be signs of more serious medical illness.    We hope you feel better.

## 2024-02-16 NOTE — ED PROVIDER NOTES
[facility] Emergency Department Note  Patient: Cristine Boggs Age: 58 year old Sex: female      MRN: [id number]  : 1965    Patient Seen in: [facility] Emergency Department    History     Chief Complaint   Patient presents with    Syncope     Stated Complaint: dizziness/lightheaded    History obtained from: Patient    Patient is a 58-year-old female with past medical history of Ménière's disease brought in by EMS today for evaluation of lightheadedness.  Patient states that starting this morning, she began having sharp pain in the back of her head.  She states that she has been feeling generally lightheaded and dizzy.  She states that at 1 point today, she took her blood pressure and noted her systolic blood pressures to be in the 80s.  States that this afternoon, she had a syncopal episode but states that she had worsening lightheadedness and dizziness prior to this episode.  She denies any associated numbness, tingling, weakness, slurred speech or vision changes.  She denies any chest pain or shortness of breath.  Denies any palpitations.  Denies any nausea or vomiting or diarrhea.  She does state that she has been under significant amount of stress at home with 2 sons in legal troubles.  Patient states that she feels generally weak.    Review of Systems:  Review of Systems  Positive for stated complaint: dizziness/lightheaded. Constitutional and vital signs reviewed. All other systems reviewed and negative except as noted above.    Patient History:  Past Medical History:   Diagnosis Date    Acute pyelonephritis     Hospitalized 2 weeks    Anesthesia complication     pt reports low BP    Colon polyps     repeat CLN in 5 years    Disorder of liver     abnormal LFTs, + AMA    Esophageal reflux     Meniere disease     SVT (supraventricular tachycardia)     Status post ablation    Vestibular disorder     Vitamin B12 deficiency     weekly B12 therapy       Past Surgical History:    Procedure Laterality Date    COLONOSCOPY  2011    per     COLONOSCOPY          COLONOSCOPY N/A 2021    Procedure: COLONOSCOPY;  Surgeon: MARIKA Schmidt MD;  Location: [facility] ENDOSCOPY    OTHER      Abdominal fibroidectomy    OTHER SURGICAL HISTORY  2022    DNC    TUBAL LIGATION Bilateral         Family History   Problem Relation Age of Onset    Stroke Father 72        CVA    Hypertension Father     Stroke Mother 79        CVA    Hypertension Mother     Diabetes Sister         type 2 - half (P)    Colon Cancer Neg         close relative    Glaucoma Neg     Macular degeneration Neg        Specific Social Determinants of Health:   Social History     Socioeconomic History    Marital status:    Occupational History    Occupation: LPN   Tobacco Use    Smoking status: Former     Packs/day: 0.00     Years: 20.00     Additional pack years: 0.00     Total pack years: 0.00     Types: Cigarettes     Quit date: 2013     Years since quittin.1    Smokeless tobacco: Never   Vaping Use    Vaping Use: Never used   Substance and Sexual Activity    Alcohol use: Yes     Alcohol/week: 1.0 standard drink of alcohol     Types: 1 Standard drinks or equivalent per week     Comment: socially    Drug use: No    Sexual activity: Not Currently     Partners: Male     Birth control/protection: Tubal Ligation   Other Topics Concern    Caffeine Concern Yes     Comment: coffee, 1cup/day    Exercise No    Pt has a pacemaker No    Pt has a defibrillator No    Reaction to local anesthetic No   Social History Narrative    The patient does not use an assistive device..      The patient does live in a home with stairs.           PSFH elements reviewed from today and agreed except as otherwise stated in HPI.    Physical Exam     ED Triage Vitals [02/15/24 1639]   /78   Pulse 85   Resp 18   Temp 99 °F (37.2 °C)   Temp src Temporal   SpO2 96 %   O2 Device None (Room air)       Current:BP  102/73   Pulse 77   Temp 99 °F (37.2 °C) (Temporal)   Resp 21   Ht 160 cm (5' 3\")   Wt 69.4 kg   SpO2 98%   BMI 27.10 kg/m²         Physical Exam  Constitutional:       General: She is not in acute distress.  HENT:      Head: Normocephalic and atraumatic.      Mouth/Throat:      Mouth: Mucous membranes are moist.   Eyes:      Extraocular Movements: Extraocular movements intact.   Cardiovascular:      Rate and Rhythm: Normal rate and regular rhythm.      Heart sounds: Normal heart sounds.   Pulmonary:      Effort: Pulmonary effort is normal. No respiratory distress.      Breath sounds: Normal breath sounds.   Abdominal:      Palpations: Abdomen is soft.      Tenderness: There is no abdominal tenderness.   Skin:     General: Skin is warm and dry.      Capillary Refill: Capillary refill takes less than 2 seconds.      Findings: No rash.   Neurological:      General: No focal deficit present.      Mental Status: She is alert and oriented to person, place, and time.      Sensory: No sensory deficit.      Motor: No weakness.      Comments: Generalized weakness secondary to poor effort, will only attempt to lift hands up against gravity for several seconds however was witnessed to lift arms conversationally without difficulty.  Only lifted bilateral feet against gravity for several seconds before falling to the bed but was noted to be ambulating independently following initial evaluation   Psychiatric:         Mood and Affect: Mood normal.         Behavior: Behavior normal.         ED Course   Labs:   Labs Reviewed   BASIC METABOLIC PANEL (8) - Abnormal; Notable for the following components:       Result Value    BUN/CREA Ratio 20.3 (*)     All other components within normal limits   PROTHROMBIN TIME (PT) - Normal   TROPONIN I HIGH SENSITIVITY - Normal   POCT GLUCOSE - Normal   SARS-COV-2/FLU A AND B/RSV BY PCR (GENEXPERT) - Normal    Narrative:     This test is intended for the qualitative detection and  differentiation of SARS-CoV-2, influenza A, influenza B, and respiratory syncytial virus (RSV) viral RNA in nasopharyngeal or nares swabs from individuals suspected of respiratory viral infection consistent with COVID-19 by their healthcare provider. Signs and symptoms of respiratory viral infection due to SARS-CoV-2, influenza, and RSV can be similar.    Test performed using the Xpert Xpress SARS-CoV-2/FLU/RSV (real time RT-PCR)  assay on the GeneXpert instrument, [employer], [place] [address].   This test is being used under the Food and Drug Administration's Emergency Use Authorization.    The authorized Fact Sheet for Healthcare Providers for this assay is available upon request from the laboratory.   CBC WITH DIFFERENTIAL WITH PLATELET    Narrative:     The following orders were created for panel order CBC With Differential With Platelet.  Procedure                               Abnormality         Status                     ---------                               -----------         ------                     CBC W/ DIFFERENTIAL[[id number]]                              Final result                 Please view results for these tests on the individual orders.   SCAN SLIDE   RAINBOW DRAW LAVENDER   RAINBOW DRAW LIGHT GREEN   RAINBOW DRAW BLUE   CBC W/ DIFFERENTIAL     Radiology findings:  I personally reviewed the images.   MRI BRAIN WO ACUTE (3) SEQUENCE (CPT=70551)    Result Date: 2/15/2024  CONCLUSION:  1. No acute infarct or hemorrhage.    Dictated by (CST): Abdoul Redman MD on 2/15/2024 at 6:08 PM     Finalized by (CST): Abdoul Redman MD on 2/15/2024 at 6:10 PM           EKG as interpreted by me: Ventricular rate 82, normal sinus rhythm, normal axis, no parable prolongation, narrow QRS, QTc 450 ms, no ST segment elevations or depressions, no abnormal T wave inversions  Cardiac Monitor: Interpreted by me.   Pulse Readings from Last 1 Encounters:   02/15/24 77   , sinus,     External non-ED records reviewed  independently by me: CT brain imaging from 4/15/2023 reviewed showing no evidence of acute mass occupying intracranial lesion.    MDM   58-year-old female with a past medical history of Ménière's disease brought in by EMS for evaluation of lightheadedness with syncopal episode today.  Upon arrival to emergency department, she is normotensive.  Vitals are within normal limits.  She has no appreciated focal neurologic deficits but complains that she feels generally weak.    Differential diagnoses considered includes, but is not limited to: Orthostatic hypotension, vasovagal syncope, electrolyte or metabolic derangement, hypoglycemia, ACS, arrhythmia, posterior circulation CVA, mass occupying intracranial lesion    Will obtain the following tests: CBC, BMP, troponin, coags, COVID/flu/RSV panel, MRI brain, EKG  Please see ED course for my independent review of these tests/imaging results.    Initial Medications/Therapeutics administered:  IV fluids, Benadryl and Valium given for anxiolysis during MRI, meclizine    Chronic conditions affecting care: Ménière's disease    Workup and medications considered but not ordered: None    Social Determinants of Health that impacted care: None     ED course: Labs overall reassuring.  Electrolytes within normal limits with no evidence of hypoglycemia.  COVID/flu/RSV negative.  Independently reviewed the MRI images that show no evidence of mass occupying intracranial lesion.  Also with no evidence of infarct.  Agree with radiology read above.  EKG was showing normal sinus rhythm with no ischemic changes or arrhythmogenic features.  She remains normotensive throughout stay in the emergency department with improvement of symptoms.  Is ambulating without difficulty without recurrence of symptoms.  I suspect her syncopal episode is likely secondary to vasovagal syncope versus orthostatic hypotension.  Stable for discharge home at this time with close PCP follow-up.  Return precautions  were provided and all questions answered.  Patient expressed understanding and agreement with this plan.      Disposition and Plan     Clinical Impression:  1. Syncope and collapse        Disposition:  Discharge    Follow-up:  Tylor Smith MD  [address] [facility] [address]  [phone]    Schedule an appointment as soon as possible for a visit in 2 day(s)  As needed, If symptoms worsen      Medications Prescribed:  Discharge Medication List as of 2/15/2024  6:56 PM            This note may have been created using voice dictation technology and may include inadvertent errors.      Hortensia Pedro MD  Emergency Medicine
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
## 2024-03-05 ENCOUNTER — TELEPHONE (OUTPATIENT)
Facility: CLINIC | Age: 59
End: 2024-03-05

## 2024-03-05 RX ORDER — METRONIDAZOLE 500 MG/1
500 TABLET ORAL EVERY 8 HOURS
Qty: 30 TABLET | Refills: 0 | Status: SHIPPED | OUTPATIENT
Start: 2024-03-05 | End: 2024-03-15

## 2024-03-05 RX ORDER — LEVOFLOXACIN 750 MG/1
750 TABLET, FILM COATED ORAL EVERY 24 HOURS
Qty: 10 TABLET | Refills: 0 | Status: SHIPPED | OUTPATIENT
Start: 2024-03-05 | End: 2024-03-15

## 2024-03-05 NOTE — TELEPHONE ENCOUNTER
LeftVM for patient:    -let us know if she can go for CT scan for eval  -labs noted recently  -start levaquin/flagyl-->risks/benefits discussed on VM, including s/e  -liquid diet for next 1-2 days  -if notimproving go to ER

## 2024-03-05 NOTE — TELEPHONE ENCOUNTER
Me     AP    3/5/24  2:17 PM  Note  Dr. Schmidt,     Patient requesting antibiotics for diverticulitis flare.      Cristine Boggs   to [facility] Clinical Staff (supporting S Porter Schmidt MD)          3/4/24 10:20 PM  Hi, Dr Schmidt  Can you please have you nurse call in a prescription for antibiotics   I have slightly temperature   And pain  I have been taken ibuprofen every 4 to 6 hours   And drinking a lot of fluids

## 2024-04-23 ENCOUNTER — PATIENT MESSAGE (OUTPATIENT)
Dept: OBGYN CLINIC | Facility: CLINIC | Age: 59
End: 2024-04-23

## 2024-04-23 DIAGNOSIS — R10.2 PELVIC PAIN IN FEMALE: Primary | ICD-10-CM

## 2024-04-23 NOTE — TELEPHONE ENCOUNTER
From: Cristine Boggs  To: Alverto Funes  Sent: 4/23/2024 8:01 AM CDT  Subject: LLQ pain    Experiencing intermittent LLQ pain — my chart appts only in Oct. Can you check if SAE wishes for pelvic U/s before seeing her? Also please ask him if he can see her sooner rather than later — Dr. White

## 2024-05-02 RX ORDER — OMEPRAZOLE 20 MG/1
20 CAPSULE, DELAYED RELEASE ORAL
Qty: 90 CAPSULE | Refills: 0 | Status: SHIPPED | OUTPATIENT
Start: 2024-05-02

## 2024-05-02 NOTE — TELEPHONE ENCOUNTER
Requested Prescriptions     Pending Prescriptions Disp Refills    OMEPRAZOLE 20 MG Oral Capsule Delayed Release [Pharmacy Med Name: Omeprazole 20 MG Oral Capsule Delayed Release] 90 capsule 0     Sig: TAKE 1 CAPSULE BY MOUTH BEFORE BREAKFAST         LOV  11/16/2023  LR  2/01/2024

## 2024-05-08 ENCOUNTER — APPOINTMENT (OUTPATIENT)
Dept: GENERAL RADIOLOGY | Age: 59
End: 2024-05-08
Attending: EMERGENCY MEDICINE
Payer: MEDICAID

## 2024-05-08 ENCOUNTER — HOSPITAL ENCOUNTER (OUTPATIENT)
Age: 59
Discharge: HOME | End: 2024-05-08
Payer: MEDICAID

## 2024-05-08 VITALS
RESPIRATION RATE: 16 BRPM | SYSTOLIC BLOOD PRESSURE: 122 MMHG | DIASTOLIC BLOOD PRESSURE: 74 MMHG | OXYGEN SATURATION: 100 % | TEMPERATURE: 99 F | HEART RATE: 90 BPM

## 2024-05-08 DIAGNOSIS — M79.671 ACUTE FOOT PAIN, RIGHT: Primary | ICD-10-CM

## 2024-05-08 PROCEDURE — 73630 X-RAY EXAM OF FOOT: CPT | Performed by: EMERGENCY MEDICINE

## 2024-05-08 PROCEDURE — 99214 OFFICE O/P EST MOD 30 MIN: CPT

## 2024-05-08 PROCEDURE — 99213 OFFICE O/P EST LOW 20 MIN: CPT

## 2024-05-08 NOTE — DISCHARGE INSTRUCTIONS
Rest, ice, and elevate foot   Alternate Advil/Tylenol as needed for pain   Drink plenty of fluids   Get plenty of rest   Avoid excessive twisting, bending, or lifting   Follow up with podiatry

## 2024-05-08 NOTE — ED INITIAL ASSESSMENT (HPI)
Patient with sudden right foot pain without injury  Pain started this morning   Hard to walk   Pain is constant even at rest  Tylenol and muscle relaxer used at home without much relief

## 2024-05-09 NOTE — ED PROVIDER NOTES
Chief Complaint   Patient presents with    Foot Pain       History obtained from: patient   services not used     HPI:     Cristine Boggs is a 58 year old female who presents with right foot pain x 1 day. Patient states she woke up this morning and when she stood noted pain in right foot. Patient localizes pain to top and lateral right foot without radiation and states pain is worse with movement and walking.  Patient took Tylenol at home without significant relief. Patient states she is on her feet a lot for work.  Denies injury/trauma, swelling, numbness, weakness, wound, change in skin color/temperature, ankle pain, calf pain or swelling.    PMH  Past Medical History:    Acute pyelonephritis    Hospitalized 2 weeks    Anesthesia complication    pt reports low BP    Colon polyps    repeat CLN in 5 years    Disorder of liver    abnormal LFTs, + AMA    Esophageal reflux    Meniere disease    SVT (supraventricular tachycardia)    Status post ablation    Vestibular disorder    Vitamin B12 deficiency    weekly B12 therapy       PFSH    PFSH asessment screens reviewed and agree.  Nurses notes reviewed I agree with documentation.    Family History   Problem Relation Age of Onset    Stroke Father 72        CVA    Hypertension Father     Stroke Mother 79        CVA    Hypertension Mother     Diabetes Sister         type 2 - half (P)    Colon Cancer Neg         close relative    Glaucoma Neg     Macular degeneration Neg      Family history reviewed with patient/caregiver and is not pertinent to presenting problem.  Social History     Socioeconomic History    Marital status:      Spouse name: Not on file    Number of children: Not on file    Years of education: Not on file    Highest education level: Not on file   Occupational History    Occupation: LPN   Tobacco Use    Smoking status: Former     Current packs/day: 0.00     Types: Cigarettes     Quit date: 1993     Years since quittin.3     Smokeless tobacco: Never   Vaping Use    Vaping status: Never Used   Substance and Sexual Activity    Alcohol use: Yes     Alcohol/week: 1.0 standard drink of alcohol     Types: 1 Standard drinks or equivalent per week     Comment: socially    Drug use: No    Sexual activity: Not Currently     Partners: Male     Birth control/protection: Tubal Ligation   Other Topics Concern     Service Not Asked    Blood Transfusions Not Asked    Caffeine Concern Yes     Comment: coffee, 1cup/day    Occupational Exposure Not Asked    Hobby Hazards Not Asked    Sleep Concern Not Asked    Stress Concern Not Asked    Weight Concern Not Asked    Special Diet Not Asked    Back Care Not Asked    Exercise No    Bike Helmet Not Asked    Seat Belt Not Asked    Self-Exams Not Asked    Grew up on a farm Not Asked    History of tanning Not Asked    Outdoor occupation Not Asked    Pt has a pacemaker No    Pt has a defibrillator No    Breast feeding Not Asked    Reaction to local anesthetic No   Social History Narrative    The patient does not use an assistive device..      The patient does live in a home with stairs.     Social Determinants of Health     Financial Resource Strain: Not on file   Food Insecurity: Not on file   Transportation Needs: Not on file   Physical Activity: Not on file   Stress: Not on file   Social Connections: Not on file   Housing Stability: Not on file         ROS:   Positive for stated complaint: right foot pain   All other systems reviewed and negative except as noted above.  Constitutional and Vital Signs Reviewed.    Physical Exam:     Findings:    /74   Pulse 90   Temp 99 °F (37.2 °C) (Temporal)   Resp 16   SpO2 100%   GENERAL: well developed, no acute distress, non-toxic appearing   SKIN: good skin turgor, no obvious rashes  HEAD: normocephalic, atraumatic  EYES: sclera non-icteric bilaterally, conjunctiva clear bilaterally  OROPHARYNX: MMM, maintaining airway and secretions  NECK: no nuchal  rigidity, no trismus, no edema, phonation normal    CARDIO: regular rate, DP pulse 2+ bilaterally, cap refill < 2 sec   LUNGS: no increased WOB  EXTREMITIES: Minimal tenderness to dorsal lateral right foot, no obvious swelling or deformity, subjective pain with dorsiflexion however FROM, no ankle or lower leg tenderness, compartments soft, CMS intact, skin intact without overlying changes   NEURO: no focal deficits  PSYCH: alert and oriented x3, answering questions appropriately, mood appropriate    MDM/Assessment/Plan:   Orders for this encounter:    Orders Placed This Encounter    XR FOOT, COMPLETE (MIN 3 VIEWS), RIGHT (CPT=73630)     Order Specific Question:   What is the Relevant Clinical Indication / Reason for Exam?     Answer:   foot pain     Order Specific Question:   Release to patient     Answer:   Immediate       Labs performed this visit:  No results found for this or any previous visit (from the past 10 hour(s)).    Imaging performed this visit:  XR FOOT, COMPLETE (MIN 3 VIEWS), RIGHT (CPT=73630)   Final Result   PROCEDURE: XR FOOT, COMPLETE (MIN 3 VIEWS), RIGHT (CPT=73630)       COMPARISON: [facility], XR FOOT WEIGHTBEARING (3 VIEWS),    RIGHT (CPT=73630), 9/13/2022, 10:36 AM.       INDICATIONS: Pt here with right anterior foot pain since waking up this    am. Pt denies injury       TECHNIQUE: 3 views were obtained.         FINDINGS:    BONES: No acute fracture or subluxation.  Mild bunion.  Mild degenerative    change IP joints of the 2nd through 5th toes.  Small calcaneal spurs.   SOFT TISSUES: Negative. No visible soft tissue swelling.    EFFUSION: None visible.    OTHER: Negative.                    =====   CONCLUSION:    1. No acute finding.               Dictated by (CST): Abdoul Redman MD on 5/08/2024 at 6:34 PM        Finalized by (CST): Abduol Redman MD on 5/08/2024 at 6:35 PM                   Medical Decision Making  DDX includes tendinopathy vs sprain vs contusion vs  osteoarthritis vs stress fracture vs other. No signs of neurovascular compromise or compartment syndrome. Tenderness localized to dorsal foot therefore suspect musculoskeletal etiology. X-ray reviewed, no evidence of acute osseous or soft tissue abnormality. Post-op shoe applied to right foot. Discussed supportive care for foot pain including rest, ice, elevation, and OTC Tylenol/Motrin as needed for pain. Instructed patient to go directly to nearest ER with any worsening or concerning symptoms. Follow up with podiatry.     Amount and/or Complexity of Data Reviewed  Radiology: ordered and independent interpretation performed.    Risk  OTC drugs.          Diagnosis:    ICD-10-CM    1. Acute foot pain, right  M79.671           All results reviewed and discussed with patient/patient's family. Patient/patient's family verbalize excellent understanding of instructions and feels comfortable with plan. All of patient's/patient's family's questions were addressed.   See AVS for detailed discharge instructions for your condition today.    Follow Up with:  Kimberly Guardado DPM  [address] [address]  [phone]    Schedule an appointment as soon as possible for a visit   Podiatry      Note: This document was dictated using Dragon medical dictation software.  Proofreading was performed to the best of my ability, but errors may be present.    Janeen Reyes PA-C

## 2024-05-14 NOTE — TELEPHONE ENCOUNTER
Cristine and I spoke this AM concerning nearly daily LLQ pain but only using analgesic a couple doses per week. She does have Diverticulitis hx but I would not expect a sub-acute course for that. She was given antibiotics for suspected Diverticulitis in early March but was unable to tolerate them and did not complete the course. The pain is not related to bowel or bladder activity. She does have a hx of a 3 cm pedunculated fibroid. I asked her to get a pelvic ultrasound prior to seeing GI. I 'll ask our Nurse to help her schedule on a Wednesday when she is off work.

## 2024-05-14 NOTE — TELEPHONE ENCOUNTER
Patient called and informed message was sent to Dr. Funes. Patient asking why she has not received a response that her message was routed to Dr. Funes. Patient informed message being sent on her behalf are not part of her DEXTER. Patient states it is her Mychart. RN again reinstated the above. Patient states she want Dr. Funes to call her and hung up.     To Dr. Funes, please review and advise.

## 2024-05-14 NOTE — TELEPHONE ENCOUNTER
Called and spoke to Cristine, states she can do any Wednesday at any time or during the week in the morning around 9 am. Ultrasound called and spoke to Lynnette. She can add patient on 5/22/2024 at 2 pm. Patient called informed of time, location and date. Patient states understanding.

## 2024-05-22 ENCOUNTER — HOSPITAL ENCOUNTER (OUTPATIENT)
Dept: ULTRASOUND IMAGING | Facility: HOSPITAL | Age: 59
Discharge: HOME | End: 2024-05-22
Attending: OBSTETRICS & GYNECOLOGY

## 2024-05-22 DIAGNOSIS — R10.2 PELVIC PAIN IN FEMALE: ICD-10-CM

## 2024-05-22 PROCEDURE — 76856 US EXAM PELVIC COMPLETE: CPT | Performed by: OBSTETRICS & GYNECOLOGY

## 2024-05-22 PROCEDURE — 76830 TRANSVAGINAL US NON-OB: CPT | Performed by: OBSTETRICS & GYNECOLOGY

## 2024-05-30 ENCOUNTER — OFFICE VISIT (OUTPATIENT)
Facility: CLINIC | Age: 59
End: 2024-05-30

## 2024-05-30 ENCOUNTER — PATIENT MESSAGE (OUTPATIENT)
Facility: CLINIC | Age: 59
End: 2024-05-30

## 2024-05-30 VITALS
BODY MASS INDEX: 27.27 KG/M2 | SYSTOLIC BLOOD PRESSURE: 103 MMHG | HEIGHT: 63 IN | DIASTOLIC BLOOD PRESSURE: 72 MMHG | WEIGHT: 153.88 LBS | HEART RATE: 81 BPM

## 2024-05-30 DIAGNOSIS — K57.92 DIVERTICULITIS: Primary | ICD-10-CM

## 2024-05-30 PROCEDURE — 99213 OFFICE O/P EST LOW 20 MIN: CPT | Performed by: INTERNAL MEDICINE

## 2024-05-30 RX ORDER — AMOXICILLIN AND CLAVULANATE POTASSIUM 875; 125 MG/1; MG/1
1 TABLET, FILM COATED ORAL 2 TIMES DAILY
Qty: 20 TABLET | Refills: 0 | Status: SHIPPED | OUTPATIENT
Start: 2024-05-30 | End: 2024-06-09

## 2024-05-30 NOTE — PROGRESS NOTES
[facility] - Gastroenterology                                                                                                      Clinic Follow-up Visit    Chief Complaint   Patient presents with    Follow - Up         Subjective/HPI:   Cristine Boggs is a 58 year old year-old female with history of GERD/esophagitis, GOEL (sees hepatology, hx of liver biopsy, no AIH), hx of recurrent diverticulitis, who presents for f/u of diverticulitis.       Since last visit;  -had a diverticulitis attack a few months ago  -took abx I prescribed  -overall doing okay, lost weight on purpose and keeping it off  -no longer on GLP agonist  -no cp/sob  -no melena or hematochezia  -no dysphagia or odynophagia  -bowels move every other day which is normal for her  -going to [facility] 5/9/22  CONCLUSION:   1.  There are findings compatible with acute diverticulitis in the left lower quadrant in the junction of the descending and sigmoid colon.  There is no evidence of bowel perforation.  No organized measurable fluid collection.   2.  Uterine fibroid within the right-anterior aspect the uterine body as seen on prior pelvic ultrasound from 4/22/22.   3.  Small hiatal hernia.       INITIAL VISIT  -Patient in the last year has had mild increases in ALT/AST, otherwise bili/alk phos wnl  -she has mild constipation issues, and some diarrhea   -Some mild RUQ pain as well, non radiating dull achy  -Prior US showing fatty liver  -Patient currently denies any GI symptoms of nausea, vomiting, dyspepsia, dysphagia, hematemesis change in bowel habits, thin stools, hematochezia, or melena.  Additionally there is no weight loss and no reported history of chest pain or shortness of breath.  -Does drink 2 beers on occasion  -Some use of herbal weight loss seeds  -No family hx of liver cirrhosis     Prior endoscopies:  2021- CLN - 2 polyps; repeat CLN  in 5years    EGD in 2018   1. The etiology of dysphagia (mild, intermittent) is due to mild reflux esophagitis in the setting of a small hiatal hernia. No stricture or mass seen.  2. Normal appearing stomach and duodenum.      2011-- EGD/CLN: Diverticulosis, esophagitis (mild), hiatal hernia. No polyps. Repeat CLN in 10 years.        Soc:  -NO smoking  -NO Etoh         Wt Readings from Last 6 Encounters:   05/30/24 153 lb 14.4 oz (69.8 kg)   02/16/24 153 lb (69.4 kg)   02/15/24 153 lb (69.4 kg)   11/16/23 151 lb (68.5 kg)   10/18/23 160 lb (72.6 kg)   09/20/23 161 lb (73 kg)        History, Medications, Allergies, ROS:      Past Medical History:    Acute pyelonephritis    Hospitalized 2 weeks    Anesthesia complication    pt reports low BP    Colon polyps    repeat CLN in 5 years    Disorder of liver    abnormal LFTs, + AMA    Esophageal reflux    Meniere disease    SVT (supraventricular tachycardia) (HCC)    Status post ablation    Vestibular disorder    Vitamin B12 deficiency    weekly B12 therapy      Past Surgical History:   Procedure Laterality Date    Colonoscopy  11/23/2011    per YFN    Colonoscopy  2011        Colonoscopy N/A 4/14/2021    Procedure: COLONOSCOPY;  Surgeon: MARIKA Schmidt MD;  Location: [facility] ENDOSCOPY    Other      Abdominal fibroidectomy    Other surgical history  05/20/2022    DNC    Tubal ligation Bilateral 1990/1991/1993      Family History   Problem Relation Age of Onset    Stroke Father 72        CVA    Hypertension Father     Stroke Mother 79        CVA    Hypertension Mother     Diabetes Sister         type 2 - half (P)    Colon Cancer Neg         close relative    Glaucoma Neg     Macular degeneration Neg       Social History:   Social History     Socioeconomic History    Marital status:    Occupational History    Occupation: LPN   Tobacco Use    Smoking status: Former     Current packs/day: 0.00     Types: Cigarettes     Quit date: 1/1/1993     Years since quitting:  31.4    Smokeless tobacco: Never   Vaping Use    Vaping status: Never Used   Substance and Sexual Activity    Alcohol use: Yes     Alcohol/week: 1.0 standard drink of alcohol     Types: 1 Standard drinks or equivalent per week     Comment: socially    Drug use: No    Sexual activity: Not Currently     Partners: Male     Birth control/protection: Tubal Ligation   Other Topics Concern    Caffeine Concern Yes     Comment: coffee, 1cup/day    Exercise No    Pt has a pacemaker No    Pt has a defibrillator No    Reaction to local anesthetic No   Social History Narrative    The patient does not use an assistive device..      The patient does live in a home with stairs.        Medications (Active prior to today's visit):  Current Outpatient Medications   Medication Sig Dispense Refill    amoxicillin clavulanate 875-125 MG Oral Tab Take 1 tablet by mouth 2 (two) times daily for 10 days. Take every 12 hours 20 tablet 0    omeprazole 20 MG Oral Capsule Delayed Release Take 1 capsule (20 mg total) by mouth before breakfast. 90 capsule 0    clonazePAM 0.5 MG Oral Tab Take 0.5 tablets (0.25 mg total) by mouth daily. 30 tablet 2    nystatin 100,000 Units/g External Ointment Apply 1 Application topically 2 (two) times daily. Use with triamcinolone. (Patient not taking: Reported on 2/16/2024) 15 g 0    triamcinolone 0.1 % External Ointment Apply 1 Application topically 2 (two) times daily. Use with nystatin. (Patient not taking: Reported on 2/16/2024) 15 g 0    cyclobenzaprine 5 MG Oral Tab Take 1 tablet (5 mg total) by mouth 2 (two) times daily as needed for Muscle spasms. (Patient not taking: Reported on 2/16/2024) 30 tablet 0       Allergies:  No Known Allergies    ROS:   CONSTITUTIONAL:  negative for fevers, chills  EYES:  negative for change in vision  RESPIRATORY:  negative for  shortness of breath  CARDIOVASCULAR:  negative for  chest pain  GASTROINTESTINAL:  see HPI  GENITOURINARY:  negative for dysuria  INTEGUMENT/BREAST:   SKIN:  negative for  rash  ALLERGIC/IMMUNOLOGIC:  negative for hay fever  ENDOCRINE:  negative for cold intolerance and heat intolerance  MUSCULOSKELETAL:  negative for  joint stiffness and joint swelling  BEHAVIOR/PSYCH:  negative for depressed mood    PHYSICAL EXAM:   Blood pressure 103/72, pulse 81, height 5' 3\" (1.6 m), weight 153 lb 14.4 oz (69.8 kg), not currently breastfeeding.    Gen- Patient appears comfortable and in no acute discomfort  HEENT: the sclera appears anicteric, oropharynx clear, mucus membranes appear moist  CV- regular rate and rhythm, the extremities are warm and well perfused   Lung- Moves air well; No labored breathing  Abdomen- soft,mild LLQ ttp,no guarding, non-distended, no abnormal bowel sounds noted, no masses are palpated  Skin- No jaundice  Ext: no cyanosis, clubbing or edema is evident.   Neuro- Alert and oriented x4, and patient is having movement of all 4 extremities     Labs/Imaging:     Patient's labs and imaging were reviewed and discussed with patient today.     .  ASSESSMENT/PLAN:   Cristine Boggs is a 58 year old year-old female with history of GERD/esophagitis, GOEL (sees hepatology, hx of liver biopsy, no AIH), hx of recurrent diverticulitis, who presents for f/u of diverticulitis.      #Abnormal LFTs -GOEL related. F1 on elastopgraphy.  +AMA blood test. However, liver biopsy not indicative of AIH. Sees hepatology.      #Screening -Last in 2021, repeat CLN in 5 years due to polyps    #Hx of diverticulitis- if recurrent attacks--> will need referral for surgery for possible sigmoidectomy. She had an attack in March 2024. IF HAS ANOTHER EPISODE, SHE WILL CONTACT ME IF ANOTHER EPISODE TO GET A CAT SCAN    #GERD/bloating - Avoid caffeine, chocolate, peppermint, and alcohol. Also avoid lying down flat or in a recumbent position for 3 hours after a meal.     Metamucil regularly  If having recurrent ab pain, will need a CAT scan    Orders This Visit:  No orders of the defined  types were placed in this encounter.      Meds This Visit:  Requested Prescriptions     Signed Prescriptions Disp Refills    amoxicillin clavulanate 875-125 MG Oral Tab 20 tablet 0     Sig: Take 1 tablet by mouth 2 (two) times daily for 10 days. Take every 12 hours       Imaging & Referrals:  None     1/29/2021    ÁNGEL Schmidt MD  Pager: [phone]        This note was partially prepared using Dragon Medical voice recognition dictation software. As a result, errors may occur. When identified, these errors have been corrected. While every attempt is made to correct errors during dictation, discrepancies may still exist.

## 2024-06-11 NOTE — TELEPHONE ENCOUNTER
From: MARIKA Schmidt  To: Cristine Boggs  Sent: 5/30/2024 11:47 AM CDT  Subject: You can reply to this message    Shukri Arcos, please reply to this.

## 2024-06-11 NOTE — TELEPHONE ENCOUNTER
D/w patient:    -she went on her trip to [facility]  -got sick/LLQ pain on her trip  -felt better after taking augmentin   -asx at this time      >>>IF pt feels pain comes back like having diverticulitis, she will contact me asap for urgent CT

## 2024-06-17 ENCOUNTER — PATIENT MESSAGE (OUTPATIENT)
Facility: LOCATION | Age: 59
End: 2024-06-17

## 2024-06-17 DIAGNOSIS — E66.09 CLASS 1 OBESITY DUE TO EXCESS CALORIES WITHOUT SERIOUS COMORBIDITY WITH BODY MASS INDEX (BMI) OF 30.0 TO 30.9 IN ADULT: ICD-10-CM

## 2024-06-17 DIAGNOSIS — R79.89 ABNORMAL LFTS: Primary | ICD-10-CM

## 2024-06-18 NOTE — TELEPHONE ENCOUNTER
Please see request from patient, last seen 01/10/2024.  Do you want her to schedule follow up?  Last labs ordered by you on the same date.

## 2024-06-18 NOTE — TELEPHONE ENCOUNTER
Karely Gandhi, RN 6/18/2024 9:25 AM CDT        ----- Message -----  From: Cristine Boggs  Sent: 6/17/2024 5:06 PM CDT  To: Em Rn Triage  Subject: Labs     Please order my blood test result   Also my Rx for weight loss

## 2024-06-20 RX ORDER — TIRZEPATIDE 2.5 MG/.5ML
2.5 INJECTION, SOLUTION SUBCUTANEOUS WEEKLY
Qty: 2 ML | Refills: 1 | Status: SHIPPED | OUTPATIENT
Start: 2024-06-20

## 2024-06-20 NOTE — TELEPHONE ENCOUNTER
Zepbound sent to pharmacy, pay cash, NO prior auth, this is approximately 400-500$, nothing we can do for cost reduction due to insurance, she can call her insurance and find out if they cover this, she will need it in writing for us to do any prior auth.

## 2024-06-20 NOTE — TELEPHONE ENCOUNTER
Advised patient of Dr. Smith's note. Patient verbalized understanding. Patient willing to pay out of pocket even if insurance does not cover it. Would like weight loss medication sent to [facility] pharmacy. Please advise.       MD Cristine Humphrey2 days ago       Hi, your liver function testing has been ordered. Unfortunately since you have medicaid they do not cover the weight loss drugs, I thought you were with the [employer] employee plan which covers the weight loss drugs.     This [employer] message has not been read.

## 2024-06-21 NOTE — TELEPHONE ENCOUNTER
Patient called and would like to follow up regarding the Zepbound.   NextUser message that was sent previously has not read yet .     Zepbound  Message [phone]  From  Louise Cuellar CMA To  Cristine Boggs Sent and Delivered  6/20/2024  9:28 AM   Last Read in NextUser  Not Read          Informed Dr Smith's note below, instructed that it was also sent through her NextUser .   Stated understanding .

## 2024-06-25 ENCOUNTER — OFFICE VISIT (OUTPATIENT)
Dept: OTOLARYNGOLOGY | Facility: CLINIC | Age: 59
End: 2024-06-25

## 2024-06-25 VITALS — WEIGHT: 156 LBS | BODY MASS INDEX: 28 KG/M2

## 2024-06-25 DIAGNOSIS — R42 DIZZINESS: Primary | ICD-10-CM

## 2024-06-25 PROCEDURE — 99213 OFFICE O/P EST LOW 20 MIN: CPT | Performed by: OTOLARYNGOLOGY

## 2024-06-25 RX ORDER — MECLIZINE HYDROCHLORIDE 25 MG/1
25 TABLET ORAL 3 TIMES DAILY PRN
Qty: 90 TABLET | Refills: 0 | Status: SHIPPED | OUTPATIENT
Start: 2024-06-25
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
## 2024-08-07 ENCOUNTER — OFFICE VISIT (OUTPATIENT)
Facility: LOCATION | Age: 59
End: 2024-08-07

## 2024-08-07 VITALS
OXYGEN SATURATION: 97 % | SYSTOLIC BLOOD PRESSURE: 102 MMHG | WEIGHT: 153 LBS | HEART RATE: 96 BPM | HEIGHT: 63 IN | DIASTOLIC BLOOD PRESSURE: 68 MMHG | BODY MASS INDEX: 27.11 KG/M2

## 2024-08-07 DIAGNOSIS — K44.9 HIATAL HERNIA WITH GERD AND ESOPHAGITIS: Primary | ICD-10-CM

## 2024-08-07 DIAGNOSIS — K21.00 HIATAL HERNIA WITH GERD AND ESOPHAGITIS: Primary | ICD-10-CM

## 2024-08-07 PROCEDURE — 99213 OFFICE O/P EST LOW 20 MIN: CPT | Performed by: INTERNAL MEDICINE

## 2024-08-07 RX ORDER — PANTOPRAZOLE SODIUM 40 MG/1
40 TABLET, DELAYED RELEASE ORAL
Qty: 90 TABLET | Refills: 1 | Status: SHIPPED | OUTPATIENT
Start: 2024-08-07

## 2024-08-07 RX ORDER — SUCRALFATE 1 G/1
1 TABLET ORAL
Qty: 60 TABLET | Refills: 0 | Status: SHIPPED | OUTPATIENT
Start: 2024-08-07

## 2024-08-07 NOTE — PATIENT INSTRUCTIONS
In order to alleviate some of your symptoms lets go ahead and start you on sucralfate which is a stronger version of Tums and you may take this before meals and nightly.

## 2024-08-07 NOTE — PROGRESS NOTES
INTERNAL MEDICINE OFFICE NOTE     Patient ID: Cristine Boggs is a 59 year old female.  Today's Date: 08/07/24  Chief Complaint: Throat Problem (Patient here and states feels like something is in the throat)    HPI  59 female presents for throat problem.  She has been having this early satiety, she feels like something is in her throat, she has some redness in her throat.  We reviewed the GI notes which back in 2018 shows hiatal hernia mild reflux, reviewed the ENT notes, reviewed her most recent hospital visits, she is no longer taking any PPI medications.  She states that a lot of the symptoms started after she had what she thinks was COVID a few weeks ago.  She had Advair in the past which caused lots of palpitations.    Vitals:    08/07/24 1107   BP: 102/68   Pulse: 96   SpO2: 97%   Weight: 153 lb (69.4 kg)   Height: 5' 3\" (1.6 m)     body mass index is 27.1 kg/m².  BP Readings from Last 3 Encounters:   08/07/24 102/68   05/30/24 103/72   05/08/24 122/74     The 10-year ASCVD risk score (Hansel ROBLEDO, et al., 2019) is: 2%    Values used to calculate the score:      Age: 59 years      Sex: Female      Is Non- : No      Diabetic: No      Tobacco smoker: No      Systolic Blood Pressure: 102 mmHg      Is BP treated: No      HDL Cholesterol: 45 mg/dL      Total Cholesterol: 180 mg/dL  Medications reviewed:  Current Outpatient Medications   Medication Sig Dispense Refill    sucralfate 1 g Oral Tab Take 1 tablet (1 g total) by mouth 4 (four) times daily before meals and nightly. 60 tablet 0    pantoprazole 40 MG Oral Tab EC Take 1 tablet (40 mg total) by mouth every morning before breakfast. 90 tablet 1    meclizine 25 MG Oral Tab Take 1 tablet (25 mg total) by mouth 3 (three) times daily as needed. 90 tablet 0    omeprazole 20 MG Oral Capsule Delayed Release Take 1 capsule (20 mg total) by mouth before breakfast. 90 capsule 0    clonazePAM 0.5 MG Oral Tab Take 0.5 tablets (0.25 mg  total) by mouth daily. 30 tablet 2    nystatin 100,000 Units/g External Ointment Apply 1 Application topically 2 (two) times daily. Use with triamcinolone. 15 g 0    triamcinolone 0.1 % External Ointment Apply 1 Application topically 2 (two) times daily. Use with nystatin. 15 g 0    cyclobenzaprine 5 MG Oral Tab Take 1 tablet (5 mg total) by mouth 2 (two) times daily as needed for Muscle spasms. 30 tablet 0         Assessment & Plan    1. Hiatal hernia with GERD and esophagitis (Primary)  -     Sucralfate; Take 1 tablet (1 g total) by mouth 4 (four) times daily before meals and nightly.  Dispense: 60 tablet; Refill: 0  -     Pantoprazole Sodium; Take 1 tablet (40 mg total) by mouth every morning before breakfast.  Dispense: 90 tablet; Refill: 1  Plan  High suspicion this is reflux with extra esophageal symptoms, she is not quite convinced, I have asked her to start pantoprazole and sucralfate but she will think about it, in the meantime she would like to trial some over-the-counter treatments to see if this alleviates her symptoms.  If no improvement with over-the-counter treatments and no improvement with the above and recommend she follow-up with GI.    Follow Up: Return for AS NEEDED/ IF SYMPTOMS WORSEN..         Objective: Results:   Physical Exam  Vitals and nursing note reviewed.   Constitutional:       General: She is not in acute distress.     Appearance: Normal appearance.   HENT:      Head: Normocephalic.      Right Ear: External ear normal.      Left Ear: External ear normal.      Mouth/Throat:      Pharynx: Posterior oropharyngeal erythema present.   Eyes:      Extraocular Movements: Extraocular movements intact.      Conjunctiva/sclera: Conjunctivae normal.   Cardiovascular:      Rate and Rhythm: Normal rate and regular rhythm.      Pulses: Normal pulses.      Heart sounds: Normal heart sounds.   Pulmonary:      Effort: Pulmonary effort is normal. No respiratory distress.      Breath sounds: Normal  breath sounds. No wheezing.   Abdominal:      General: Abdomen is flat. Bowel sounds are normal.      Tenderness: There is no abdominal tenderness.   Musculoskeletal:         General: Normal range of motion.      Cervical back: Normal range of motion and neck supple.   Skin:     Coloration: Skin is not jaundiced.   Neurological:      General: No focal deficit present.      Mental Status: She is alert and oriented to person, place, and time. Mental status is at baseline.   Psychiatric:         Mood and Affect: Mood normal.         Behavior: Behavior normal.        Reviewed:    Patient Active Problem List    Diagnosis    Diverticulitis    Abdominal bloating    Gastroesophageal reflux disease    Chronic idiopathic constipation    Fatigue    Class 1 obesity due to excess calories without serious comorbidity with body mass index (BMI) of 30.0 to 30.9 in adult    GOEL (nonalcoholic steatohepatitis)    Elevated liver enzymes    Chronic vulvitis    Left low back pain    Endometrial polyp    Cervical polyp    Thickened endometrium    Age-related nuclear cataract of both eyes    Diverticulitis of large intestine without perforation or abscess without bleeding    Abnormal LFTs    Polyp of colon    Fasciculation of tongue    Dizziness    Hives    Hordeolum externum of left lower eyelid    Myopia of both eyes with astigmatism and presbyopia    Tinea pedis of both feet    Bladder prolapse, female, acquired    Lipoma of face    History of seizure    Headache disorder    Gastroesophageal reflux disease with esophagitis    Eye pain, bilateral    Fibroid uterus     Per US 3/17      Right ovarian cyst     Per US 3/17      Bilateral presbyopia    Glaucoma suspect of both eyes    Anxiety disorder      No Known Allergies     Social History     Socioeconomic History    Marital status:    Occupational History    Occupation: LPN   Tobacco Use    Smoking status: Former     Current packs/day: 0.00     Types: Cigarettes     Quit date:  1993     Years since quittin.6    Smokeless tobacco: Never   Vaping Use    Vaping status: Never Used   Substance and Sexual Activity    Alcohol use: Yes     Alcohol/week: 1.0 standard drink of alcohol     Types: 1 Standard drinks or equivalent per week     Comment: socially    Drug use: No    Sexual activity: Not Currently     Partners: Male     Birth control/protection: Tubal Ligation   Other Topics Concern    Caffeine Concern Yes     Comment: coffee, 1cup/day    Exercise No    Pt has a pacemaker No    Pt has a defibrillator No    Reaction to local anesthetic No   Social History Narrative    The patient does not use an assistive device..      The patient does live in a home with stairs.      Review of Systems  All other systems negative unless otherwise stated in ROS or HPI above.       Tylor Smith MD  Internal Medicine       Call office with any questions or seek emergency care if necessary.   Patient understands and agrees to follow directions and advice.      ----------------------------------------- PATIENT INSTRUCTIONS-----------------------------------------   .    Patient Instructions   In order to alleviate some of your symptoms lets go ahead and start you on sucralfate which is a stronger version of Tums and you may take this before meals and nightly.

## 2024-08-15 ENCOUNTER — OFFICE VISIT (OUTPATIENT)
Dept: SURGERY | Facility: CLINIC | Age: 59
End: 2024-08-15

## 2024-08-15 VITALS
RESPIRATION RATE: 16 BRPM | TEMPERATURE: 98 F | HEART RATE: 77 BPM | BODY MASS INDEX: 27 KG/M2 | SYSTOLIC BLOOD PRESSURE: 108 MMHG | DIASTOLIC BLOOD PRESSURE: 68 MMHG | WEIGHT: 153.19 LBS | OXYGEN SATURATION: 98 %

## 2024-08-15 DIAGNOSIS — K75.81 METABOLIC DYSFUNCTION-ASSOCIATED STEATOHEPATITIS (MASH): Primary | ICD-10-CM

## 2024-08-25 DIAGNOSIS — Z87.898 HX OF DIZZINESS: ICD-10-CM

## 2024-08-29 ENCOUNTER — PATIENT MESSAGE (OUTPATIENT)
Dept: OTOLARYNGOLOGY | Facility: CLINIC | Age: 59
End: 2024-08-29

## 2024-08-29 DIAGNOSIS — Z87.898 HX OF DIZZINESS: ICD-10-CM

## 2024-08-30 NOTE — TELEPHONE ENCOUNTER
From: Cristine Boggs  To: Nagi Baker  Sent: 8/29/2024 10:59 AM CDT  Subject: Medication refill    Hi, I am so sorry to bother you but I need to have my medication refill that will be for clonazepam. Thank you Doctor . You have a wonderful day. It has been requested since two weeks ago. By the pharmacy

## 2024-08-30 NOTE — TELEPHONE ENCOUNTER
Last Office Visit: 5/30/2024       Last Refill: 5/2/2024    Procedure:  Colonoscopy  04/14/2021     Requested Prescriptions     Pending Prescriptions Disp Refills    omeprazole 20 MG Oral Capsule Delayed Release 90 capsule 0     Sig: Take 1 capsule (20 mg total) by mouth before breakfast.      Routed to office on call

## 2024-09-03 ENCOUNTER — OFFICE VISIT (OUTPATIENT)
Dept: OTOLARYNGOLOGY | Facility: CLINIC | Age: 59
End: 2024-09-03
Payer: MEDICAID

## 2024-09-03 DIAGNOSIS — R42 DIZZINESS: Primary | ICD-10-CM

## 2024-09-03 DIAGNOSIS — R25.3 FASCICULATION OF TONGUE: ICD-10-CM

## 2024-09-03 DIAGNOSIS — Z87.898 HX OF DIZZINESS: ICD-10-CM

## 2024-09-03 PROCEDURE — 99213 OFFICE O/P EST LOW 20 MIN: CPT | Performed by: OTOLARYNGOLOGY

## 2024-09-03 RX ORDER — PANTOPRAZOLE SODIUM 40 MG/1
40 TABLET, DELAYED RELEASE ORAL
COMMUNITY
Start: 2024-08-25

## 2024-09-03 RX ORDER — MECLIZINE HYDROCHLORIDE 25 MG/1
25 TABLET ORAL 3 TIMES DAILY PRN
Qty: 90 TABLET | Refills: 1 | Status: SHIPPED | OUTPATIENT
Start: 2024-09-03

## 2024-09-03 RX ORDER — MONTELUKAST SODIUM 10 MG/1
10 TABLET ORAL NIGHTLY
Qty: 30 TABLET | Refills: 3 | Status: SHIPPED | OUTPATIENT
Start: 2024-09-03

## 2024-09-03 RX ORDER — CLONAZEPAM 0.5 MG/1
0.25 TABLET ORAL DAILY
Qty: 30 TABLET | Refills: 2 | Status: SHIPPED | OUTPATIENT
Start: 2024-09-03

## 2024-09-03 RX ORDER — AZELASTINE 1 MG/ML
2 SPRAY, METERED NASAL 2 TIMES DAILY
Qty: 30 ML | Refills: 3 | Status: SHIPPED | OUTPATIENT
Start: 2024-09-03
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
## 2024-09-10 ENCOUNTER — LAB ENCOUNTER (OUTPATIENT)
Dept: LAB | Age: 59
End: 2024-09-10
Attending: INTERNAL MEDICINE
Payer: MEDICAID

## 2024-09-10 DIAGNOSIS — R79.89 ABNORMAL LFTS: ICD-10-CM

## 2024-09-10 DIAGNOSIS — K75.81 METABOLIC DYSFUNCTION-ASSOCIATED STEATOHEPATITIS (MASH): ICD-10-CM

## 2024-09-10 LAB
ALBUMIN SERPL-MCNC: 4.8 G/DL (ref 3.2–4.8)
ALBUMIN/GLOB SERPL: 1.5 {RATIO} (ref 1–2)
ALP LIVER SERPL-CCNC: 72 U/L
ALT SERPL-CCNC: 39 U/L
ANION GAP SERPL CALC-SCNC: 7 MMOL/L (ref 0–18)
AST SERPL-CCNC: 32 U/L (ref ?–34)
BASOPHILS # BLD AUTO: 0.04 X10(3) UL (ref 0–0.2)
BASOPHILS NFR BLD AUTO: 0.8 %
BILIRUB DIRECT SERPL-MCNC: 0.1 MG/DL (ref ?–0.3)
BILIRUB SERPL-MCNC: 0.5 MG/DL (ref 0.3–1.2)
BUN BLD-MCNC: 18 MG/DL (ref 9–23)
BUN/CREAT SERPL: 24.7 (ref 10–20)
CALCIUM BLD-MCNC: 10 MG/DL (ref 8.7–10.4)
CHLORIDE SERPL-SCNC: 107 MMOL/L (ref 98–112)
CO2 SERPL-SCNC: 26 MMOL/L (ref 21–32)
CREAT BLD-MCNC: 0.73 MG/DL
DEPRECATED RDW RBC AUTO: 41.8 FL (ref 35.1–46.3)
EGFRCR SERPLBLD CKD-EPI 2021: 95 ML/MIN/1.73M2 (ref 60–?)
EOSINOPHIL # BLD AUTO: 0.16 X10(3) UL (ref 0–0.7)
EOSINOPHIL NFR BLD AUTO: 3 %
ERYTHROCYTE [DISTWIDTH] IN BLOOD BY AUTOMATED COUNT: 13 % (ref 11–15)
FASTING STATUS PATIENT QL REPORTED: NO
GLOBULIN PLAS-MCNC: 3.2 G/DL (ref 2–3.5)
GLUCOSE BLD-MCNC: 93 MG/DL (ref 70–99)
HCT VFR BLD AUTO: 41.8 %
HGB BLD-MCNC: 13.6 G/DL
IMM GRANULOCYTES # BLD AUTO: 0 X10(3) UL (ref 0–1)
IMM GRANULOCYTES NFR BLD: 0 %
INR BLD: 1 (ref 0.8–1.2)
LYMPHOCYTES # BLD AUTO: 1.76 X10(3) UL (ref 1–4)
LYMPHOCYTES NFR BLD AUTO: 33.5 %
MCH RBC QN AUTO: 28.4 PG (ref 26–34)
MCHC RBC AUTO-ENTMCNC: 32.5 G/DL (ref 31–37)
MCV RBC AUTO: 87.3 FL
MONOCYTES # BLD AUTO: 0.47 X10(3) UL (ref 0.1–1)
MONOCYTES NFR BLD AUTO: 8.9 %
NEUTROPHILS # BLD AUTO: 2.83 X10 (3) UL (ref 1.5–7.7)
NEUTROPHILS # BLD AUTO: 2.83 X10(3) UL (ref 1.5–7.7)
NEUTROPHILS NFR BLD AUTO: 53.8 %
OSMOLALITY SERPL CALC.SUM OF ELEC: 292 MOSM/KG (ref 275–295)
PLATELET # BLD AUTO: 268 10(3)UL (ref 150–450)
POTASSIUM SERPL-SCNC: 4.4 MMOL/L (ref 3.5–5.1)
PROT SERPL-MCNC: 8 G/DL (ref 5.7–8.2)
PROTHROMBIN TIME: 13.8 SECONDS (ref 11.6–14.8)
RBC # BLD AUTO: 4.79 X10(6)UL
SODIUM SERPL-SCNC: 140 MMOL/L (ref 136–145)
WBC # BLD AUTO: 5.3 X10(3) UL (ref 4–11)

## 2024-09-10 PROCEDURE — 80053 COMPREHEN METABOLIC PANEL: CPT

## 2024-09-10 PROCEDURE — 36415 COLL VENOUS BLD VENIPUNCTURE: CPT

## 2024-09-10 PROCEDURE — 85610 PROTHROMBIN TIME: CPT

## 2024-09-10 PROCEDURE — 85025 COMPLETE CBC W/AUTO DIFF WBC: CPT

## 2024-09-10 PROCEDURE — 82248 BILIRUBIN DIRECT: CPT

## 2024-10-03 RX ORDER — CLONAZEPAM 0.5 MG/1
0.25 TABLET ORAL DAILY
Qty: 30 TABLET | Refills: 2 | OUTPATIENT
Start: 2024-10-03

## 2024-10-04 ENCOUNTER — IMMUNIZATION (OUTPATIENT)
Dept: LAB | Age: 59
End: 2024-10-04
Attending: EMERGENCY MEDICINE
Payer: MEDICAID

## 2024-10-04 DIAGNOSIS — Z23 NEED FOR VACCINATION: Primary | ICD-10-CM

## 2024-10-04 PROCEDURE — 90656 IIV3 VACC NO PRSV 0.5 ML IM: CPT

## 2024-10-04 PROCEDURE — 90471 IMMUNIZATION ADMIN: CPT

## 2024-10-05 RX ORDER — CLONAZEPAM 0.5 MG/1
0.25 TABLET ORAL DAILY
Qty: 15 TABLET | Refills: 0 | OUTPATIENT
Start: 2024-10-05

## 2024-10-10 ENCOUNTER — APPOINTMENT (OUTPATIENT)
Dept: CT IMAGING | Facility: HOSPITAL | Age: 59
End: 2024-10-10
Attending: EMERGENCY MEDICINE
Payer: MEDICAID

## 2024-10-10 ENCOUNTER — HOSPITAL ENCOUNTER (EMERGENCY)
Facility: HOSPITAL | Age: 59
Discharge: HOME | End: 2024-10-10
Attending: EMERGENCY MEDICINE
Payer: MEDICAID

## 2024-10-10 VITALS
HEIGHT: 62 IN | HEART RATE: 86 BPM | SYSTOLIC BLOOD PRESSURE: 103 MMHG | WEIGHT: 155 LBS | OXYGEN SATURATION: 96 % | DIASTOLIC BLOOD PRESSURE: 67 MMHG | TEMPERATURE: 99 F | BODY MASS INDEX: 28.52 KG/M2 | RESPIRATION RATE: 18 BRPM

## 2024-10-10 DIAGNOSIS — K82.4 GALLBLADDER POLYP: ICD-10-CM

## 2024-10-10 DIAGNOSIS — K57.92 ACUTE DIVERTICULITIS: Primary | ICD-10-CM

## 2024-10-10 LAB
ALBUMIN SERPL-MCNC: 4.9 G/DL (ref 3.2–4.8)
ALBUMIN/GLOB SERPL: 1.7 {RATIO} (ref 1–2)
ALP LIVER SERPL-CCNC: 75 U/L
ALT SERPL-CCNC: 31 U/L
ANION GAP SERPL CALC-SCNC: 6 MMOL/L (ref 0–18)
AST SERPL-CCNC: 22 U/L (ref ?–34)
BASOPHILS # BLD AUTO: 0.03 X10(3) UL (ref 0–0.2)
BASOPHILS NFR BLD AUTO: 0.3 %
BILIRUB SERPL-MCNC: 0.6 MG/DL (ref 0.3–1.2)
BILIRUB UR QL: NEGATIVE
BUN BLD-MCNC: 15 MG/DL (ref 9–23)
BUN/CREAT SERPL: 20.8 (ref 10–20)
CALCIUM BLD-MCNC: 9.8 MG/DL (ref 8.7–10.4)
CHLORIDE SERPL-SCNC: 108 MMOL/L (ref 98–112)
CLARITY UR: CLEAR
CO2 SERPL-SCNC: 28 MMOL/L (ref 21–32)
CREAT BLD-MCNC: 0.72 MG/DL
DEPRECATED RDW RBC AUTO: 41.4 FL (ref 35.1–46.3)
EGFRCR SERPLBLD CKD-EPI 2021: 96 ML/MIN/1.73M2 (ref 60–?)
EOSINOPHIL # BLD AUTO: 0.15 X10(3) UL (ref 0–0.7)
EOSINOPHIL NFR BLD AUTO: 1.4 %
ERYTHROCYTE [DISTWIDTH] IN BLOOD BY AUTOMATED COUNT: 13.1 % (ref 11–15)
GLOBULIN PLAS-MCNC: 2.9 G/DL (ref 2–3.5)
GLUCOSE BLD-MCNC: 120 MG/DL (ref 70–99)
GLUCOSE UR-MCNC: NORMAL MG/DL
HCT VFR BLD AUTO: 40.7 %
HGB BLD-MCNC: 13.2 G/DL
HGB UR QL STRIP.AUTO: NEGATIVE
IMM GRANULOCYTES # BLD AUTO: 0.03 X10(3) UL (ref 0–1)
IMM GRANULOCYTES NFR BLD: 0.3 %
KETONES UR-MCNC: NEGATIVE MG/DL
LEUKOCYTE ESTERASE UR QL STRIP.AUTO: NEGATIVE
LIPASE SERPL-CCNC: 32 U/L (ref 12–53)
LYMPHOCYTES # BLD AUTO: 1.51 X10(3) UL (ref 1–4)
LYMPHOCYTES NFR BLD AUTO: 14.4 %
MCH RBC QN AUTO: 28.2 PG (ref 26–34)
MCHC RBC AUTO-ENTMCNC: 32.4 G/DL (ref 31–37)
MCV RBC AUTO: 87 FL
MONOCYTES # BLD AUTO: 0.78 X10(3) UL (ref 0.1–1)
MONOCYTES NFR BLD AUTO: 7.4 %
NEUTROPHILS # BLD AUTO: 7.97 X10 (3) UL (ref 1.5–7.7)
NEUTROPHILS # BLD AUTO: 7.97 X10(3) UL (ref 1.5–7.7)
NEUTROPHILS NFR BLD AUTO: 76.2 %
NITRITE UR QL STRIP.AUTO: NEGATIVE
OSMOLALITY SERPL CALC.SUM OF ELEC: 296 MOSM/KG (ref 275–295)
PH UR: 5 [PH] (ref 5–8)
PLATELET # BLD AUTO: 257 10(3)UL (ref 150–450)
POTASSIUM SERPL-SCNC: 3.6 MMOL/L (ref 3.5–5.1)
PROT SERPL-MCNC: 7.8 G/DL (ref 5.7–8.2)
PROT UR-MCNC: NEGATIVE MG/DL
RBC # BLD AUTO: 4.68 X10(6)UL
SODIUM SERPL-SCNC: 142 MMOL/L (ref 136–145)
SP GR UR STRIP: 1.02 (ref 1–1.03)
UROBILINOGEN UR STRIP-ACNC: NORMAL
WBC # BLD AUTO: 10.5 X10(3) UL (ref 4–11)

## 2024-10-10 PROCEDURE — 96374 THER/PROPH/DIAG INJ IV PUSH: CPT

## 2024-10-10 PROCEDURE — 99285 EMERGENCY DEPT VISIT HI MDM: CPT

## 2024-10-10 PROCEDURE — 80053 COMPREHEN METABOLIC PANEL: CPT | Performed by: EMERGENCY MEDICINE

## 2024-10-10 PROCEDURE — 81003 URINALYSIS AUTO W/O SCOPE: CPT | Performed by: EMERGENCY MEDICINE

## 2024-10-10 PROCEDURE — 99284 EMERGENCY DEPT VISIT MOD MDM: CPT

## 2024-10-10 PROCEDURE — 83690 ASSAY OF LIPASE: CPT | Performed by: EMERGENCY MEDICINE

## 2024-10-10 PROCEDURE — 74177 CT ABD & PELVIS W/CONTRAST: CPT | Performed by: EMERGENCY MEDICINE

## 2024-10-10 PROCEDURE — 85025 COMPLETE CBC W/AUTO DIFF WBC: CPT | Performed by: EMERGENCY MEDICINE

## 2024-10-10 PROCEDURE — 96376 TX/PRO/DX INJ SAME DRUG ADON: CPT

## 2024-10-10 PROCEDURE — 96375 TX/PRO/DX INJ NEW DRUG ADDON: CPT

## 2024-10-10 RX ORDER — ONDANSETRON 2 MG/ML
4 INJECTION INTRAMUSCULAR; INTRAVENOUS ONCE
Status: COMPLETED | OUTPATIENT
Start: 2024-10-10 | End: 2024-10-10

## 2024-10-10 RX ORDER — IBUPROFEN 600 MG/1
600 TABLET, FILM COATED ORAL EVERY 8 HOURS PRN
Qty: 12 TABLET | Refills: 0 | Status: SHIPPED | OUTPATIENT
Start: 2024-10-10 | End: 2024-10-14

## 2024-10-10 NOTE — ED PROVIDER NOTES
Patient Seen in: [facility] Emergency Department      History     Chief Complaint   Patient presents with    Abdominal Pain     Stated Complaint: Abd Pain    Subjective:   59-year-old female with history of diverticulitis in the past, pyelonephritis in the past, fibroids here with 2 days of abdominal pain.  She is pointing to the left lower quadrant as well as to the right side of the abdomen and right flank.  Fairly constant now and severe.  Increases with certain movements and palpation.  She did have a fever this morning.  No nausea vomiting or diarrhea.  No dysuria.  She did have a bowel movement earlier today which was normal.  No cough or congestion.  No trauma or travel.  Patient's gastroenterologist told her if she has more diverticulitis she might have need surgery.              Objective:     No pertinent past medical history.            No pertinent past surgical history.              No pertinent social history.                Physical Exam     ED Triage Vitals [10/10/24 1331]   BP 97/69   Pulse 92   Resp 18   Temp 98.6 °F (37 °C)   Temp src    SpO2 99 %   O2 Device        Current Vitals:   Vital Signs  BP: 103/67  Pulse: 86  Resp: 18  Temp: 98.6 °F (37 °C)  MAP (mmHg): 80    Oxygen Therapy  SpO2: 96 %        Physical Exam  HENT:      Head: Normocephalic and atraumatic.   Eyes:      Extraocular Movements: Extraocular movements intact.      Pupils: Pupils are equal, round, and reactive to light.   Cardiovascular:      Rate and Rhythm: Normal rate and regular rhythm.   Pulmonary:      Effort: Pulmonary effort is normal.      Breath sounds: Normal breath sounds.   Abdominal:      Palpations: Abdomen is soft.      Comments: Bowel sounds present.  Significant tenderness to the left lower quadrant and right lower quadrant.  No significant right upper quadrant tenderness.  No rebound or guarding.   Musculoskeletal:      Cervical back: Neck supple.   Skin:     General: Skin is warm.      Capillary  Refill: Capillary refill takes less than 2 seconds.   Neurological:      Mental Status: She is alert.      Sensory: No sensory deficit.      Motor: No weakness.             ED Course     Labs Reviewed   COMP METABOLIC PANEL (14) - Abnormal; Notable for the following components:       Result Value    Glucose 120 (*)     BUN/CREA Ratio 20.8 (*)     Calculated Osmolality 296 (*)     Albumin 4.9 (*)     All other components within normal limits   CBC WITH DIFFERENTIAL WITH PLATELET - Abnormal; Notable for the following components:    Neutrophil Absolute Prelim 7.97 (*)     Neutrophil Absolute 7.97 (*)     All other components within normal limits   LIPASE - Normal   URINALYSIS WITH CULTURE REFLEX       ED Course as of 10/10/24 1841  ------------------------------------------------------------  Time: 10/10 1809  Comment: Labs independently interpreted by me.  CBC CMP unremarkable.  UA normal, lipase normal, CT shows diverticulitis and a small polyp in the gallbladder versus stone.  I discussed this with her.  When she follows up with Dr. Schmidt from GI she can discuss this as well.  Patient is refusing ceftriaxone and Flagyl and said when they gave that to her she got confused.  The only regimen she will except is Augmentin.  She is fine taking it oral going home.  She will return for changes worsening.  She voiced understanding of all instructions.              [facility]      CT ABDOMEN+PELVIS(CONTRAST ONLY)(CPT=74177)    Result Date: 10/10/2024  CONCLUSION:   Acute uncomplicated diverticulitis of the distal descending colon.  No macroscopic free air or pericolic abscess.  Small polyp versus gallstone with no evidence of acute cholecystitis.  Recommend nonemergent right upper quadrant ultrasound for further characterization.  Additional chronic or incidental findings are described in the body of this report.    elm-remote    Dictated by (CST): Vipin Rodriges MD on 10/10/2024 at 5:15 PM     Finalized by (CST): Vipin Rodriges MD  on 10/10/2024 at 5:21 PM                 Medical Decision Making  Patient here with 2 days of abdominal pain.  Significantly tender left lower quadrant some on the right as well.  Differential is vast could include diverticulitis, appendicitis, perforation, bowel obstruction, ischemia, cholecystitis, renal colic, pyelonephritis and many other possibilities.  Will give fentanyl and perform labs and do CT.    Amount and/or Complexity of Data Reviewed  External Data Reviewed: labs and notes.     Details: Compared to old labs.  Labs: ordered. Decision-making details documented in ED Course.  Radiology: ordered and independent interpretation performed. Decision-making details documented in ED Course.  Discussion of management or test interpretation with external provider(s): Patient refused double coverage antibiotics but she did except Augmentin which I felt would be better than no treatment for diverticulitis.  She understands she needs an ultrasound for her gallbladder finding.  Will return for changes or worsening.  Please see ED course.    Risk  Prescription drug management.        Disposition and Plan     Clinical Impression:  1. Acute diverticulitis    2. Gallbladder polyp         Disposition:  Discharge  10/10/2024  6:11 pm    Follow-up:  Tylor Smith MD  [address] [facility] [address]  [phone]    Follow up      Naseem Mazariegos MD  [address] [id number] [phone]    Follow up            Medications Prescribed:  Discharge Medication List as of 10/10/2024  6:21 PM        START taking these medications    Details   amoxicillin clavulanate 875-125 MG Oral Tab Take 1 tablet by mouth 2 (two) times daily for 10 days., Normal, Disp-20 tablet, R-0      ibuprofen 600 MG Oral Tab Take 1 tablet (600 mg total) by mouth every 8 (eight) hours as needed for Pain or Fever., Normal, Disp-12 tablet, R-0                 Supplementary Documentation:

## 2024-10-10 NOTE — DISCHARGE INSTRUCTIONS
Return for changes or worsening.  Take Augmentin as directed.  Follow-up with your primary and gastroenterologist.  Ibuprofen every 8 hours as needed for pain.  Drink plenty of fluids and stay hydrated.  Read all instructions.  Dr. Schmidt will need to check your CAT scan report and decide if he needs to do an ultrasound of your gallbladder to see whether it is a gallstone or a polyp.

## 2024-10-16 DIAGNOSIS — N76.3 CHRONIC VULVITIS: Primary | ICD-10-CM

## 2024-10-17 RX ORDER — NYSTATIN 100000 U/G
1 OINTMENT TOPICAL 2 TIMES DAILY
Qty: 15 G | Refills: 0 | Status: SHIPPED | OUTPATIENT
Start: 2024-10-17

## 2024-10-17 RX ORDER — NYSTATIN 100000 U/G
OINTMENT TOPICAL
Qty: 15 G | Refills: 0 | OUTPATIENT
Start: 2024-10-17

## 2024-10-17 RX ORDER — TRIAMCINOLONE ACETONIDE 1 MG/G
1 OINTMENT TOPICAL 2 TIMES DAILY
Qty: 15 G | Refills: 0 | Status: SHIPPED | OUTPATIENT
Start: 2024-10-17 | End: 2024-10-29

## 2024-10-17 RX ORDER — TRIAMCINOLONE ACETONIDE 1 MG/G
OINTMENT TOPICAL
Qty: 15 G | Refills: 0 | OUTPATIENT
Start: 2024-10-17

## 2024-10-17 NOTE — TELEPHONE ENCOUNTER
Patient called, states she's still using the nystatin and triamcinolone PRN for Chronic Vulvitis. Prescription canceled in error by another providers office. Prescription sent.     To Dr. Funes, for review and sign-off. Thank you.

## 2024-10-29 ENCOUNTER — OFFICE VISIT (OUTPATIENT)
Dept: FAMILY MEDICINE CLINIC | Facility: CLINIC | Age: 59
End: 2024-10-29
Payer: MEDICAID

## 2024-10-29 VITALS
SYSTOLIC BLOOD PRESSURE: 96 MMHG | DIASTOLIC BLOOD PRESSURE: 69 MMHG | BODY MASS INDEX: 27 KG/M2 | WEIGHT: 149 LBS | HEART RATE: 81 BPM

## 2024-10-29 DIAGNOSIS — J06.9 VIRAL URI: ICD-10-CM

## 2024-10-29 DIAGNOSIS — Z00.00 ENCOUNTER FOR ANNUAL HEALTH EXAMINATION: ICD-10-CM

## 2024-10-29 DIAGNOSIS — Z12.4 CERVICAL CANCER SCREENING: ICD-10-CM

## 2024-10-29 DIAGNOSIS — N76.3 CHRONIC VULVITIS: ICD-10-CM

## 2024-10-29 DIAGNOSIS — N89.8 VAGINAL DRYNESS: ICD-10-CM

## 2024-10-29 DIAGNOSIS — I95.9 HYPOTENSION, UNSPECIFIED HYPOTENSION TYPE: ICD-10-CM

## 2024-10-29 DIAGNOSIS — R79.89 ABNORMAL LFTS: Primary | ICD-10-CM

## 2024-10-29 DIAGNOSIS — R68.82 LOW LIBIDO: ICD-10-CM

## 2024-10-29 DIAGNOSIS — E55.9 VITAMIN D DEFICIENCY: ICD-10-CM

## 2024-10-29 DIAGNOSIS — R51.9 ACUTE INTRACTABLE HEADACHE, UNSPECIFIED HEADACHE TYPE: ICD-10-CM

## 2024-10-29 PROBLEM — R14.0 ABDOMINAL BLOATING: Status: RESOLVED | Noted: 2023-11-16 | Resolved: 2024-10-29

## 2024-10-29 PROBLEM — K57.92 DIVERTICULITIS: Status: RESOLVED | Noted: 2024-05-30 | Resolved: 2024-10-29

## 2024-10-29 PROBLEM — K21.9 GASTROESOPHAGEAL REFLUX DISEASE: Status: RESOLVED | Noted: 2023-11-16 | Resolved: 2024-10-29

## 2024-10-29 PROCEDURE — 99204 OFFICE O/P NEW MOD 45 MIN: CPT | Performed by: FAMILY MEDICINE

## 2024-10-29 RX ORDER — TRIAMCINOLONE ACETONIDE 1 MG/G
1 OINTMENT TOPICAL 2 TIMES DAILY
COMMUNITY
Start: 2024-10-29

## 2024-10-29 NOTE — PROGRESS NOTES
HPI:   Cristine Boggs is a 59 year old female who presents for an establish care visit.     Patient has noticed a small lump on the top of head, occasionally tender to palpation.    Blood pressure tends to run low, patient has experienced presyncopal episodes in the past.    Patient does experience vaginal dryness, low libido.    Still dealing with viral URI symptoms but improving, now able to expectorate    Wt Readings from Last 3 Encounters:   10/29/24 149 lb (67.6 kg)   10/10/24 155 lb (70.3 kg)   08/15/24 153 lb 3.2 oz (69.5 kg)     Body mass index is 27.25 kg/m².       Current Outpatient Medications   Medication Sig Dispense Refill    triamcinolone 0.1 % External Ointment Apply 1 Application topically 2 (two) times daily.      nystatin 100,000 Units/g External Ointment Apply 1 Application topically 2 (two) times daily. 15 g 0    pantoprazole 40 MG Oral Tab EC Take 1 tablet (40 mg total) by mouth before breakfast.      clonazePAM 0.5 MG Oral Tab Take 0.5 tablets (0.25 mg total) by mouth daily. 30 tablet 2    montelukast 10 MG Oral Tab Take 1 tablet (10 mg total) by mouth nightly. 30 tablet 3    omeprazole 20 MG Oral Capsule Delayed Release Take 1 capsule (20 mg total) by mouth before breakfast. 90 capsule 0    meclizine 25 MG Oral Tab Take 1 tablet (25 mg total) by mouth 3 (three) times daily as needed. (Patient not taking: Reported on 10/29/2024) 90 tablet 1    azelastine 0.1 % Nasal Solution 2 sprays by Nasal route 2 (two) times daily. (Patient not taking: Reported on 10/29/2024) 30 mL 3      Past Medical History:    Acute pyelonephritis    Hospitalized 2 weeks    Anesthesia complication    pt reports low BP    Colon polyps    repeat CLN in 5 years    Disorder of liver    abnormal LFTs, + AMA    Esophageal reflux    Meniere disease    SVT (supraventricular tachycardia) (HCC)    Status post ablation    Vestibular disorder    Vitamin B12 deficiency    weekly B12 therapy      Past Surgical History:   Procedure  Laterality Date    Colonoscopy  2011    per     Colonoscopy          Colonoscopy N/A 2021    Procedure: COLONOSCOPY;  Surgeon: MARIKA Schmidt MD;  Location: [facility] ENDOSCOPY    Other      Abdominal fibroidectomy    Other surgical history  2022    DNC    Tubal ligation Bilateral       Family History   Problem Relation Age of Onset    Stroke Father 72        CVA    Hypertension Father     Stroke Mother 79        CVA    Hypertension Mother     Diabetes Sister         type 2 - half (P)    Colon Cancer Neg         close relative    Glaucoma Neg     Macular degeneration Neg       Social History:   Social History     Socioeconomic History    Marital status:    Occupational History    Occupation: LPN   Tobacco Use    Smoking status: Former     Current packs/day: 0.00     Types: Cigarettes     Quit date: 1993     Years since quittin.8    Smokeless tobacco: Never   Vaping Use    Vaping status: Never Used   Substance and Sexual Activity    Alcohol use: Yes     Alcohol/week: 1.0 standard drink of alcohol     Types: 1 Standard drinks or equivalent per week     Comment: socially    Drug use: No    Sexual activity: Not Currently     Partners: Male     Birth control/protection: Tubal Ligation   Other Topics Concern    Caffeine Concern Yes     Comment: coffee, 1cup/day    Exercise No    Pt has a pacemaker No    Pt has a defibrillator No    Reaction to local anesthetic No   Social History Narrative    The patient does not use an assistive device..      The patient does live in a home with stairs.          REVIEW OF SYSTEMS:   Negative, except per HPI.     EXAM:   BP 96/69 (BP Location: Right arm, Patient Position: Sitting, Cuff Size: adult)   Pulse 81   Wt 149 lb (67.6 kg)   BMI 27.25 kg/m²     GENERAL: well developed, well nourished, in no apparent distress  SKIN: no rashes, no suspicious lesions  SCALP: + small lump on top of head   HEENT: atraumatic, normocephalic,  ears are clear  EYES: PERRLA, EOMI,conjunctiva are clear  NECK: supple, no adenopathy  LUNGS: clear to auscultation  CARDIO: RRR without murmur    ASSESSMENT AND PLAN:     1. Abnormal LFTs  - lfts have normalized  - Ctap reviewed  - follows with hepatology/Dr. Tellez    2. Low libido  See OBG, may benefit from estrogen cream?    3. Chronic vulvitis  Noted in hx, has been given triamcinolone in the past     4. Vaginal dryness  obg    5. Viral URI  Improving.     6. Cervical cancer screening  - OBG - INTERNAL    7. Acute intractable headache, unspecified headache type  - nsiads prn     8. Hypotension, unspecified hypotension type  - BP is normal/low. Does report occ syncopal episodes.     RTC if no improvement in symptoms. Red flags discussed to go to DANYELL Overton MD  10/29/2024  10:49 AM

## 2024-11-05 ENCOUNTER — OFFICE VISIT (OUTPATIENT)
Dept: INTERNAL MEDICINE CLINIC | Facility: CLINIC | Age: 59
End: 2024-11-05

## 2024-11-05 VITALS
OXYGEN SATURATION: 98 % | SYSTOLIC BLOOD PRESSURE: 111 MMHG | DIASTOLIC BLOOD PRESSURE: 66 MMHG | BODY MASS INDEX: 27.97 KG/M2 | HEART RATE: 76 BPM | WEIGHT: 152 LBS | HEIGHT: 62 IN

## 2024-11-05 DIAGNOSIS — N89.8 VAGINAL DRYNESS: ICD-10-CM

## 2024-11-05 DIAGNOSIS — R05.8 POST-VIRAL COUGH SYNDROME: ICD-10-CM

## 2024-11-05 DIAGNOSIS — Z12.4 ENCOUNTER FOR SCREENING FOR CERVICAL CANCER: ICD-10-CM

## 2024-11-05 DIAGNOSIS — K57.32 DIVERTICULITIS OF LARGE INTESTINE WITHOUT PERFORATION OR ABSCESS WITHOUT BLEEDING: ICD-10-CM

## 2024-11-05 DIAGNOSIS — R68.82 LOW LIBIDO: ICD-10-CM

## 2024-11-05 DIAGNOSIS — Z00.01 ENCOUNTER FOR GENERAL ADULT MEDICAL EXAMINATION WITH ABNORMAL FINDINGS: Primary | ICD-10-CM

## 2024-11-05 DIAGNOSIS — K82.4 GALLBLADDER POLYP: ICD-10-CM

## 2024-11-05 DIAGNOSIS — K75.81 NASH (NONALCOHOLIC STEATOHEPATITIS): ICD-10-CM

## 2024-11-05 DIAGNOSIS — Z13.820 ENCOUNTER FOR OSTEOPOROSIS SCREENING IN ASYMPTOMATIC POSTMENOPAUSAL PATIENT: ICD-10-CM

## 2024-11-05 DIAGNOSIS — Z78.0 ENCOUNTER FOR OSTEOPOROSIS SCREENING IN ASYMPTOMATIC POSTMENOPAUSAL PATIENT: ICD-10-CM

## 2024-11-05 DIAGNOSIS — Z12.31 ENCOUNTER FOR SCREENING MAMMOGRAM FOR BREAST CANCER: ICD-10-CM

## 2024-11-05 DIAGNOSIS — Z12.11 ENCOUNTER FOR SCREENING COLONOSCOPY: ICD-10-CM

## 2024-11-05 DIAGNOSIS — E66.3 OVERWEIGHT (BMI 25.0-29.9): ICD-10-CM

## 2024-11-05 DIAGNOSIS — K63.5 POLYP OF COLON, UNSPECIFIED PART OF COLON, UNSPECIFIED TYPE: ICD-10-CM

## 2024-11-05 DIAGNOSIS — B35.1 ONYCHOMYCOSIS: ICD-10-CM

## 2024-11-05 DIAGNOSIS — K21.00 GASTROESOPHAGEAL REFLUX DISEASE WITH ESOPHAGITIS WITHOUT HEMORRHAGE: ICD-10-CM

## 2024-11-05 DIAGNOSIS — H04.123 DRY EYES: ICD-10-CM

## 2024-11-05 DIAGNOSIS — R74.8 ELEVATED LIVER ENZYMES: ICD-10-CM

## 2024-11-05 PROCEDURE — 99214 OFFICE O/P EST MOD 30 MIN: CPT | Performed by: INTERNAL MEDICINE

## 2024-11-05 PROCEDURE — 99396 PREV VISIT EST AGE 40-64: CPT | Performed by: INTERNAL MEDICINE

## 2024-11-05 RX ORDER — BENZONATATE 200 MG/1
200 CAPSULE ORAL 3 TIMES DAILY PRN
Qty: 30 CAPSULE | Refills: 0 | Status: SHIPPED | OUTPATIENT
Start: 2024-11-05

## 2024-11-05 RX ORDER — ESTRADIOL 0.1 MG/G
1 CREAM VAGINAL
Qty: 42.5 G | Refills: 0 | Status: SHIPPED | OUTPATIENT
Start: 2024-11-05

## 2024-11-05 RX ORDER — TERBINAFINE HYDROCHLORIDE 250 MG/1
250 TABLET ORAL DAILY
Qty: 84 TABLET | Refills: 0 | Status: SHIPPED | OUTPATIENT
Start: 2024-11-05 | End: 2025-01-28

## 2024-11-05 NOTE — PROGRESS NOTES
REASON FOR VISIT      Crisitne Boggs is a 59 year old female who presents for  Annual Physical Exam (not Medicare, or ABN signed for Medicare non covered service) and scheduled follow up of multiple significant but stable problems.     HCM   - mammogram: UTD for 2024  - colon: next due 2026  - PAP: per gyne   - DEXA: start at 65   - Labs: Ordered today.   - imm: Flu shot? COVID booster? Shingles? PNA? Needs flu shot.   - depression: neg     Patient had a URI mid-Oct. She had a runny nose, sinus pressure, cough. She continues to have a dry cough. She occasionally does produce some sputum.     GERD. History of diverticulitis. Following with GI. She was recently seen in ED for abdominal pain, was noted to have recurrent diverticulitis, acute and uncomplicated. She never took the antibiotics that were prescribed. She is not prepared to have a sigmoidectomy yet.  Incidentally, a gallbladder polyp was noted on CT without any signs of cholecystitis.     Has a history of fatty liver and GOEL. She followed with a hepatologist, who did a liver biopsy which confirmed the diagnosis of GOEL. She has been on semaglutide intermittently and is tolerating this well.     Fibroids. Following with gyne.     She has vaginal dryness and low libido.    She has some thickening of her the large toenail on the left foot. She has tried topicals without much benefit. Has tolerated terbinafine in the past.     Has dry eyes. Would like to be seen by ophthalmology.      Past Medical History     Past Medical History:    Acute pyelonephritis    Hospitalized 2 weeks    Anesthesia complication    pt reports low BP    Colon polyps    repeat CLN in 5 years    Disorder of liver    abnormal LFTs, + AMA    Esophageal reflux    Meniere disease    SVT (supraventricular tachycardia) (HCC)    Status post ablation    Vestibular disorder    Vitamin B12 deficiency    weekly B12 therapy        Past Surgical History     Past Surgical History:   Procedure  Laterality Date    Colonoscopy  2011    per     Colonoscopy          Colonoscopy N/A 2021    Procedure: COLONOSCOPY;  Surgeon: MARIKA Schmidt MD;  Location: [facility] ENDOSCOPY    Other      Abdominal fibroidectomy    Other surgical history  2022    DNC    Tubal ligation Bilateral         Family History     Family History   Problem Relation Age of Onset    Stroke Father 72        CVA    Hypertension Father     Stroke Mother 79        CVA    Hypertension Mother     Diabetes Sister         type 2 - half (P)    Colon Cancer Neg         close relative    Glaucoma Neg     Macular degeneration Neg        Social History     Social History     Socioeconomic History    Marital status:    Occupational History    Occupation: LPN   Tobacco Use    Smoking status: Some Days     Current packs/day: 0.00     Types: Cigarettes     Last attempt to quit: 1993     Years since quittin.8    Smokeless tobacco: Never    Tobacco comments:     occasionally   Vaping Use    Vaping status: Never Used   Substance and Sexual Activity    Alcohol use: Yes     Alcohol/week: 1.0 standard drink of alcohol     Types: 1 Standard drinks or equivalent per week     Comment: socially    Drug use: No    Sexual activity: Not Currently     Partners: Male     Birth control/protection: Tubal Ligation   Other Topics Concern    Caffeine Concern Yes     Comment: coffee, 1cup/day    Exercise No    Pt has a pacemaker No    Pt has a defibrillator No    Reaction to local anesthetic No   Social History Narrative    The patient does not use an assistive device..      The patient does live in a home with stairs.       Tobacco:   She currently has tobacco smoking, smokeless, or e-cigarette status listed as never assessed or unknown.    Please update the information in the Tobacco history section to reflect the true status, and refresh this smartlink.    CAGE Alcohol Screen:   Cage screening not needed because reported  NO to Alcohol use on social history.    Depression Screening (PHQ):  PHQ-2/9 not done in last week, please ask questions. Last done              MIRYAM-7 Total Score                 Allergies     Allergies[1]    Current Medications     Current Outpatient Medications   Medication Sig Dispense Refill    triamcinolone 0.1 % External Ointment Apply 1 Application topically 2 (two) times daily.      nystatin 100,000 Units/g External Ointment Apply 1 Application topically 2 (two) times daily. 15 g 0    pantoprazole 40 MG Oral Tab EC Take 1 tablet (40 mg total) by mouth before breakfast.      clonazePAM 0.5 MG Oral Tab Take 0.5 tablets (0.25 mg total) by mouth daily. 30 tablet 2    montelukast 10 MG Oral Tab Take 1 tablet (10 mg total) by mouth nightly. 30 tablet 3    omeprazole 20 MG Oral Capsule Delayed Release Take 1 capsule (20 mg total) by mouth before breakfast. 90 capsule 0    meclizine 25 MG Oral Tab Take 1 tablet (25 mg total) by mouth 3 (three) times daily as needed. (Patient not taking: Reported on 11/5/2024) 90 tablet 1    azelastine 0.1 % Nasal Solution 2 sprays by Nasal route 2 (two) times daily. (Patient not taking: Reported on 11/5/2024) 30 mL 3     No current facility-administered medications for this visit.       Screenings     History/Other:   Fall Risk Assessment:    (Incomplete)        Cognitive Assessment:    (Incomplete)  Tip  Cognitive assessment incomplete. Refresh to ensure screening pulls in; if not,click link above to assess, then refresh:[id number]}      Functional Ability/Status:    (Incomplete)      Immunization History   Administered Date(s) Administered    Covid-19 Vaccine Pfizer 30 mcg/0.3 ml 04/03/2021, 04/25/2021    Covid-19 Vaccine Pfizer Guillaume-Sucrose 30 mcg/0.3 ml 02/22/2022    FLUZONE 6 months and older PFS 0.5 ml (16646) 12/03/2016, 10/15/2020    Influenza 11/10/2014, 10/07/2015, 11/17/2020    Influenza Vaccine, trivalent (IIV3), PF 0.5mL (28776) 10/04/2024    Pneumococcal (Prevnar 13)  10/07/2015    TDAP 11/01/2014, 11/10/2014    Tb Intradermal Test 07/14/2015    Zoster Vaccine Recombinant Adjuvanted (Shingrix) 10/15/2020, 02/21/2021       Health Maintenance   Topic Date Due    Tobacco Cessation Counseling  Never done    Annual Physical  09/20/2024    Mammogram  02/03/2025    COVID-19 Vaccine (4 - 2023-24 season) 12/05/2024 (Originally 9/1/2024)    DTaP,Tdap,and Td Vaccines (3 - Td or Tdap) 11/10/2024    Colorectal Cancer Screening  04/14/2026    Pap Smear  09/15/2026    Influenza Vaccine  Completed    Annual Depression Screening  Completed    Zoster Vaccines  Completed    Pneumococcal Vaccine: Birth to 64yrs  Aged Out         Review of Systems     GENERAL HEALTH: feels well otherwise  SKIN: denies any unusual skin lesions or rashes  RESPIRATORY: denies shortness of breath with exertion  CARDIOVASCULAR: denies chest pain on exertion  GI: denies abdominal pain and denies heartburn  : denies any burning with urination, urinary frequency or urgency  NEURO: denies headaches, numbness or tingling, mental status changes  PSYCH: denies depressed mood, anxiety  MUSC: denies muscle aches, joint pain      Physical Exam     EXAM:  /66 (BP Location: Left arm, Patient Position: Sitting, Cuff Size: adult)   Pulse 76   Ht 5' 2\" (1.575 m)   Wt 152 lb (68.9 kg)   SpO2 98%   BMI 27.80 kg/m²   >   BP Readings from Last 3 Encounters:   11/05/24 111/66   10/29/24 96/69   10/10/24 103/67     No LMP recorded. (Menstrual status: Menopause).  GENERAL: well developed, well nourished, in no apparent distress  SKIN: no rashes, no suspicious lesions  HEENT: atraumatic, normocephalic, ears and throat are clear  EYES:PERRLA, EOMI, conjunctiva are clear.    NECK: supple, no adenopathy, no bruits  CHEST: no chest tenderness  BREAST: deferred   LUNGS: clear to auscultation  CARDIO: RRR without murmur  GI: good BS's, no masses, HSM or tenderness  :deferred  RECTAL: deferred  MUSCULOSKELETAL: back is not tender, FROM  of the back  EXTREMITIES: no cyanosis, clubbing or edema  NEURO: Oriented times three, cranial nerves are intact, motor and sensory are grossly intact  FOOT: deferred - toe nails could not be visualized as patient is wearing nail polish       Assessment and Plan     Cristine Boggs is a 59 year old female who presents for an Annual Health Assessment.     Diagnoses and all orders for this visit:    Encounter for general adult medical examination with abnormal findings. Age appropriate screenings and vaccinations discussed. Counseled on healthy diet, exercise, sleep hygiene. Patient advised that any additional concerns not included in a routine physical may result in additional charges and/or a copay. Patient verbally acknowledged this information and agreed to proceed.   -     CBC W Differential W Platelet [E]; Future  -     Comp Metabolic Panel (14) [E]; Future  -     Lipid Panel; Future  -     Hemoglobin A1C [E]; Future  -     TSH W Reflex To Free T4 [E]; Future  -     Urinalysis with Culture Reflex; Future  -     Vitamin D [E]; Future    Encounter for screening colonoscopy. Due 2026.     Encounter for screening mammogram for breast cancer. Due 2/2024,     Encounter for screening for cervical cancer, Following with gyne.     Encounter for osteoporosis screening in asymptomatic postmenopausal patient. Start at 65.     GOEL (nonalcoholic steatohepatitis). Transaminases have normalized. Liver with normal echodensity on recent CT abd/pelvis. Continue weight loss, GLP1, and lifestyle modifications.   -     CBC W Differential W Platelet [E]; Future  -     Comp Metabolic Panel (14) [E]; Future  -     Lipid Panel; Future  -     Hemoglobin A1C [E]; Future  -     TSH W Reflex To Free T4 [E]; Future  -     Urinalysis with Culture Reflex; Future  -     Vitamin D [E]; Future    Elevated liver enzymes. Iso GOEL. Have resolved. However, will plan to check again in 6 months as patient is starting terbinafine.   -     CBC W  Differential W Platelet [E]; Future  -     Comp Metabolic Panel (14) [E]; Future  -     Lipid Panel; Future  -     Hemoglobin A1C [E]; Future  -     TSH W Reflex To Free T4 [E]; Future  -     Urinalysis with Culture Reflex; Future  -     Vitamin D [E]; Future    Gastroesophageal reflux disease with esophagitis without hemorrhage. Cont PPI. Follow w/ GI.   -     CBC W Differential W Platelet [E]; Future  -     Comp Metabolic Panel (14) [E]; Future  -     Lipid Panel; Future  -     Hemoglobin A1C [E]; Future  -     TSH W Reflex To Free T4 [E]; Future  -     Urinalysis with Culture Reflex; Future  -     Vitamin D [E]; Future    Diverticulitis of large intestine without perforation or abscess without bleeding. Multiple recurrences. She will see GI regarding this, but needs as well to be referred to surgery for consideration of sigmoidectomy. Patient is very reluctant to have this done.   -     CBC W Differential W Platelet [E]; Future  -     Comp Metabolic Panel (14) [E]; Future  -     Lipid Panel; Future  -     Hemoglobin A1C [E]; Future  -     TSH W Reflex To Free T4 [E]; Future  -     Urinalysis with Culture Reflex; Future  -     Vitamin D [E]; Future    Post-viral cough syndrome. Start tessalon pearls. Expect improvement. Normal cardiopulmonary exam.  -     benzonatate 200 MG Oral Cap; Take 1 capsule (200 mg total) by mouth 3 (three) times daily as needed.  -     CBC W Differential W Platelet [E]; Future  -     Comp Metabolic Panel (14) [E]; Future  -     Lipid Panel; Future  -     Hemoglobin A1C [E]; Future  -     TSH W Reflex To Free T4 [E]; Future  -     Urinalysis with Culture Reflex; Future  -     Vitamin D [E]; Future    Polyp of colon, unspecified part of colon, unspecified type. Repeat colon due 2026.   -     CBC W Differential W Platelet [E]; Future  -     Comp Metabolic Panel (14) [E]; Future  -     Lipid Panel; Future  -     Hemoglobin A1C [E]; Future  -     TSH W Reflex To Free T4 [E]; Future  -      Urinalysis with Culture Reflex; Future  -     Vitamin D [E]; Future    Gallbladder polyp. Likely not the cause of this patient's symptoms. However, she would like this evaluated with US. Ordered.   -     CBC W Differential W Platelet [E]; Future  -     Comp Metabolic Panel (14) [E]; Future  -     Lipid Panel; Future  -     Hemoglobin A1C [E]; Future  -     TSH W Reflex To Free T4 [E]; Future  -     Urinalysis with Culture Reflex; Future  -     Vitamin D [E]; Future  -     US GALLBLADDER (CPT=76705); Future    Onychomycosis Start terbinafine. Check LFTs in 6 mo.   -     CBC W Differential W Platelet [E]; Future  -     Comp Metabolic Panel (14) [E]; Future    Overweight (BMI 25.0-29.9) Cont semaglutide.   -     CBC W Differential W Platelet [E]; Future  -     Comp Metabolic Panel (14) [E]; Future  -     Lipid Panel; Future  -     Hemoglobin A1C [E]; Future  -     TSH W Reflex To Free T4 [E]; Future  -     Urinalysis with Culture Reflex; Future  -     Vitamin D [E]; Future    Vaginal dryness. Start vaginal estrogen.   -     estradiol 0.1 MG/GM Vaginal Cream; Place 1 g vaginally twice a week.  -     Estrogens Fractionated, S [E]; Future  -     Progesterone; Future  -     LH (Luteinizing Hormone) [E]; Future  -     FSH [E]; Future    Low libido. Might need to refer back to gyne.   -     Estrogens Fractionated, S [E]; Future  -     Progesterone; Future  -     LH (Luteinizing Hormone) [E]; Future  -     FSH [E]; Future    Dry eyes Follow w/ optho.   -     Ophthalmology Referral - External    Other orders  -     terbinafine 250 MG Oral Tab; Take 1 tablet (250 mg total) by mouth daily.         The patient indicates understanding of these issues and agrees to the plan.  The patient is asked to return in 6 months for office visit  Diet counseling perfomed  Exercise counseling perfomed    Electronically signed by Nallely Cates MD 11/05/24         [1] No Known Allergies

## 2024-11-05 NOTE — PATIENT INSTRUCTIONS
Please start the following medications:   Vaginal estrogen 1 g 1-2 a week   Benzonatate 200 mg three times a day as needed  Terbinafine 250 mg daily x 12 weeks     Follow up in 6 months.     Schedule US of gallbladder.     Follow up with GI re: recurrent diverticulitis.

## 2024-11-18 ENCOUNTER — LAB ENCOUNTER (OUTPATIENT)
Dept: LAB | Age: 59
End: 2024-11-18
Attending: NURSE PRACTITIONER
Payer: MEDICAID

## 2024-11-18 DIAGNOSIS — N89.8 VAGINAL DRYNESS: ICD-10-CM

## 2024-11-18 DIAGNOSIS — Z00.01 ENCOUNTER FOR GENERAL ADULT MEDICAL EXAMINATION WITH ABNORMAL FINDINGS: ICD-10-CM

## 2024-11-18 DIAGNOSIS — B35.1 ONYCHOMYCOSIS: ICD-10-CM

## 2024-11-18 DIAGNOSIS — R74.8 ELEVATED LIVER ENZYMES: ICD-10-CM

## 2024-11-18 DIAGNOSIS — K21.00 GASTROESOPHAGEAL REFLUX DISEASE WITH ESOPHAGITIS WITHOUT HEMORRHAGE: ICD-10-CM

## 2024-11-18 DIAGNOSIS — E66.3 OVERWEIGHT (BMI 25.0-29.9): ICD-10-CM

## 2024-11-18 DIAGNOSIS — K57.32 DIVERTICULITIS OF LARGE INTESTINE WITHOUT PERFORATION OR ABSCESS WITHOUT BLEEDING: ICD-10-CM

## 2024-11-18 DIAGNOSIS — R68.82 LOW LIBIDO: ICD-10-CM

## 2024-11-18 DIAGNOSIS — K82.4 GALLBLADDER POLYP: ICD-10-CM

## 2024-11-18 DIAGNOSIS — R05.8 POST-VIRAL COUGH SYNDROME: ICD-10-CM

## 2024-11-18 DIAGNOSIS — K75.81 NASH (NONALCOHOLIC STEATOHEPATITIS): ICD-10-CM

## 2024-11-18 DIAGNOSIS — K63.5 POLYP OF COLON, UNSPECIFIED PART OF COLON, UNSPECIFIED TYPE: ICD-10-CM

## 2024-11-18 LAB
ALBUMIN SERPL-MCNC: 5.1 G/DL (ref 3.2–4.8)
ALBUMIN/GLOB SERPL: 1.8 {RATIO} (ref 1–2)
ALP LIVER SERPL-CCNC: 80 U/L
ALT SERPL-CCNC: 30 U/L
ANION GAP SERPL CALC-SCNC: 7 MMOL/L (ref 0–18)
AST SERPL-CCNC: 26 U/L (ref ?–34)
BASOPHILS # BLD AUTO: 0.03 X10(3) UL (ref 0–0.2)
BASOPHILS NFR BLD AUTO: 0.5 %
BILIRUB SERPL-MCNC: 0.3 MG/DL (ref 0.3–1.2)
BILIRUB UR QL: NEGATIVE
BUN BLD-MCNC: 13 MG/DL (ref 9–23)
BUN/CREAT SERPL: 19.1 (ref 10–20)
CALCIUM BLD-MCNC: 10.4 MG/DL (ref 8.7–10.4)
CHLORIDE SERPL-SCNC: 107 MMOL/L (ref 98–112)
CHOLEST SERPL-MCNC: 176 MG/DL (ref ?–200)
CLARITY UR: CLEAR
CO2 SERPL-SCNC: 27 MMOL/L (ref 21–32)
COLOR UR: COLORLESS
CREAT BLD-MCNC: 0.68 MG/DL
DEPRECATED RDW RBC AUTO: 39.8 FL (ref 35.1–46.3)
EGFRCR SERPLBLD CKD-EPI 2021: 100 ML/MIN/1.73M2 (ref 60–?)
EOSINOPHIL # BLD AUTO: 0.13 X10(3) UL (ref 0–0.7)
EOSINOPHIL NFR BLD AUTO: 2.1 %
ERYTHROCYTE [DISTWIDTH] IN BLOOD BY AUTOMATED COUNT: 12.5 % (ref 11–15)
EST. AVERAGE GLUCOSE BLD GHB EST-MCNC: 108 MG/DL (ref 68–126)
FASTING PATIENT LIPID ANSWER: NO
FASTING STATUS PATIENT QL REPORTED: NO
FSH SERPL-ACNC: 53.3 MIU/ML
GLOBULIN PLAS-MCNC: 2.8 G/DL (ref 2–3.5)
GLUCOSE BLD-MCNC: 87 MG/DL (ref 70–99)
GLUCOSE UR-MCNC: NORMAL MG/DL
HBA1C MFR BLD: 5.4 % (ref ?–5.7)
HCT VFR BLD AUTO: 40.9 %
HDLC SERPL-MCNC: 43 MG/DL (ref 40–59)
HGB BLD-MCNC: 13.5 G/DL
HGB UR QL STRIP.AUTO: NEGATIVE
IMM GRANULOCYTES # BLD AUTO: 0.01 X10(3) UL (ref 0–1)
IMM GRANULOCYTES NFR BLD: 0.2 %
KETONES UR-MCNC: NEGATIVE MG/DL
LDLC SERPL CALC-MCNC: 114 MG/DL (ref ?–100)
LEUKOCYTE ESTERASE UR QL STRIP.AUTO: NEGATIVE
LH SERPL-ACNC: 23.3 MIU/ML
LYMPHOCYTES # BLD AUTO: 1.87 X10(3) UL (ref 1–4)
LYMPHOCYTES NFR BLD AUTO: 30.5 %
MCH RBC QN AUTO: 28.7 PG (ref 26–34)
MCHC RBC AUTO-ENTMCNC: 33 G/DL (ref 31–37)
MCV RBC AUTO: 86.8 FL
MONOCYTES # BLD AUTO: 0.44 X10(3) UL (ref 0.1–1)
MONOCYTES NFR BLD AUTO: 7.2 %
NEUTROPHILS # BLD AUTO: 3.66 X10 (3) UL (ref 1.5–7.7)
NEUTROPHILS # BLD AUTO: 3.66 X10(3) UL (ref 1.5–7.7)
NEUTROPHILS NFR BLD AUTO: 59.5 %
NITRITE UR QL STRIP.AUTO: NEGATIVE
NONHDLC SERPL-MCNC: 133 MG/DL (ref ?–130)
OSMOLALITY SERPL CALC.SUM OF ELEC: 291 MOSM/KG (ref 275–295)
PH UR: 5.5 [PH] (ref 5–8)
PLATELET # BLD AUTO: 247 10(3)UL (ref 150–450)
POTASSIUM SERPL-SCNC: 4.3 MMOL/L (ref 3.5–5.1)
PROGEST SERPL-MCNC: <0.21 NG/ML
PROT SERPL-MCNC: 7.9 G/DL (ref 5.7–8.2)
PROT UR-MCNC: NEGATIVE MG/DL
RBC # BLD AUTO: 4.71 X10(6)UL
SODIUM SERPL-SCNC: 141 MMOL/L (ref 136–145)
SP GR UR STRIP: <1.005 (ref 1–1.03)
TRIGL SERPL-MCNC: 106 MG/DL (ref 30–149)
TSI SER-ACNC: 2.18 UIU/ML (ref 0.55–4.78)
UROBILINOGEN UR STRIP-ACNC: NORMAL
VIT D+METAB SERPL-MCNC: 25.3 NG/ML (ref 30–100)
VLDLC SERPL CALC-MCNC: 18 MG/DL (ref 0–30)
WBC # BLD AUTO: 6.1 X10(3) UL (ref 4–11)

## 2024-11-18 PROCEDURE — 84144 ASSAY OF PROGESTERONE: CPT

## 2024-11-18 PROCEDURE — 80061 LIPID PANEL: CPT

## 2024-11-18 PROCEDURE — 83002 ASSAY OF GONADOTROPIN (LH): CPT

## 2024-11-18 PROCEDURE — 81003 URINALYSIS AUTO W/O SCOPE: CPT

## 2024-11-18 PROCEDURE — 82306 VITAMIN D 25 HYDROXY: CPT

## 2024-11-18 PROCEDURE — 80053 COMPREHEN METABOLIC PANEL: CPT

## 2024-11-18 PROCEDURE — 84443 ASSAY THYROID STIM HORMONE: CPT

## 2024-11-18 PROCEDURE — 83036 HEMOGLOBIN GLYCOSYLATED A1C: CPT

## 2024-11-18 PROCEDURE — 36415 COLL VENOUS BLD VENIPUNCTURE: CPT

## 2024-11-18 PROCEDURE — 83001 ASSAY OF GONADOTROPIN (FSH): CPT

## 2024-11-18 PROCEDURE — 85025 COMPLETE CBC W/AUTO DIFF WBC: CPT

## 2024-11-18 PROCEDURE — 82671 ASSAY OF ESTROGENS: CPT

## 2024-11-20 RX ORDER — ERGOCALCIFEROL 1.25 MG/1
50000 CAPSULE, LIQUID FILLED ORAL WEEKLY
Qty: 8 CAPSULE | Refills: 0 | Status: SHIPPED | OUTPATIENT
Start: 2024-11-20

## 2024-11-21 ENCOUNTER — TELEPHONE (OUTPATIENT)
Dept: INTERNAL MEDICINE CLINIC | Facility: CLINIC | Age: 59
End: 2024-11-21

## 2024-11-21 NOTE — TELEPHONE ENCOUNTER
Patient called the office to go over test results from 11/18. Reviewed results with her, all questions answered. Patient also asking for clarification on ultrasounds that are ordered and being done. Reviewed when those ultrasounds were and what they were doing imaging of.

## 2024-11-22 LAB
ESTRADIOL: <5 PG/ML
ESTRONE: 17 PG/ML

## 2024-11-24 ENCOUNTER — APPOINTMENT (OUTPATIENT)
Dept: GENERAL RADIOLOGY | Facility: HOSPITAL | Age: 59
End: 2024-11-24
Attending: EMERGENCY MEDICINE
Payer: COMMERCIAL

## 2024-11-24 ENCOUNTER — APPOINTMENT (OUTPATIENT)
Dept: CT IMAGING | Facility: HOSPITAL | Age: 59
End: 2024-11-24
Attending: EMERGENCY MEDICINE
Payer: COMMERCIAL

## 2024-11-24 ENCOUNTER — HOSPITAL ENCOUNTER (EMERGENCY)
Facility: HOSPITAL | Age: 59
Discharge: HOME | End: 2024-11-24
Attending: EMERGENCY MEDICINE
Payer: COMMERCIAL

## 2024-11-24 VITALS
DIASTOLIC BLOOD PRESSURE: 75 MMHG | RESPIRATION RATE: 18 BRPM | SYSTOLIC BLOOD PRESSURE: 105 MMHG | BODY MASS INDEX: 26.4 KG/M2 | TEMPERATURE: 98 F | HEIGHT: 63 IN | HEART RATE: 78 BPM | OXYGEN SATURATION: 98 % | WEIGHT: 149 LBS

## 2024-11-24 DIAGNOSIS — S32.10XA CLOSED FRACTURE OF SACRUM AND COCCYX, INITIAL ENCOUNTER (HCC): Primary | ICD-10-CM

## 2024-11-24 DIAGNOSIS — S32.2XXA CLOSED FRACTURE OF SACRUM AND COCCYX, INITIAL ENCOUNTER (HCC): Primary | ICD-10-CM

## 2024-11-24 PROCEDURE — 99285 EMERGENCY DEPT VISIT HI MDM: CPT

## 2024-11-24 PROCEDURE — 99284 EMERGENCY DEPT VISIT MOD MDM: CPT

## 2024-11-24 PROCEDURE — 72100 X-RAY EXAM L-S SPINE 2/3 VWS: CPT | Performed by: EMERGENCY MEDICINE

## 2024-11-24 PROCEDURE — 73502 X-RAY EXAM HIP UNI 2-3 VIEWS: CPT | Performed by: EMERGENCY MEDICINE

## 2024-11-24 PROCEDURE — 71045 X-RAY EXAM CHEST 1 VIEW: CPT | Performed by: EMERGENCY MEDICINE

## 2024-11-24 PROCEDURE — 70450 CT HEAD/BRAIN W/O DYE: CPT | Performed by: EMERGENCY MEDICINE

## 2024-11-24 RX ORDER — HYDROCODONE BITARTRATE AND ACETAMINOPHEN 5; 325 MG/1; MG/1
1 TABLET ORAL EVERY 6 HOURS PRN
Qty: 10 TABLET | Refills: 0 | Status: SHIPPED | OUTPATIENT
Start: 2024-11-24 | End: 2024-12-01

## 2024-11-24 RX ORDER — CYCLOBENZAPRINE HCL 10 MG
10 TABLET ORAL ONCE
Status: COMPLETED | OUTPATIENT
Start: 2024-11-24 | End: 2024-11-24

## 2024-11-24 RX ORDER — HYDROCODONE BITARTRATE AND ACETAMINOPHEN 5; 325 MG/1; MG/1
1 TABLET ORAL ONCE
Status: COMPLETED | OUTPATIENT
Start: 2024-11-24 | End: 2024-11-24

## 2024-11-24 NOTE — ED PROVIDER NOTES
Patient Seen in: [facility] Emergency Department      History     Chief Complaint   Patient presents with    Fall     Stated Complaint: fall    Subjective:   HPI  59-year-old female who presents for evaluation after a fall.  She slipped on soapy water in a retail store bathroom and landed on her left side.  She complains of pain primarily in the left hip. She also has pain at the lower back.  No numbness tingling or weakness of the lower extremities, urinary or bowel retention or incontinence.  She did hit her head and has some mild nausea and headache.  No neck pain.  No blood thinners.        Objective:     Past Medical History:    Acute pyelonephritis    Hospitalized 2 weeks    Anesthesia complication    pt reports low BP    Colon polyps    repeat CLN in 5 years    Disorder of liver    abnormal LFTs, + AMA    Esophageal reflux    Meniere disease    SVT (supraventricular tachycardia) (HCC)    Status post ablation    Vestibular disorder    Vitamin B12 deficiency    weekly B12 therapy              Past Surgical History:   Procedure Laterality Date    Colonoscopy  2011    per     Colonoscopy          Colonoscopy N/A 2021    Procedure: COLONOSCOPY;  Surgeon: MARIKA Schmidt MD;  Location: [facility] ENDOSCOPY    Other      Abdominal fibroidectomy    Other surgical history  2022    DNC    Tubal ligation Bilateral                 Social History     Socioeconomic History    Marital status:    Occupational History    Occupation: LPN   Tobacco Use    Smoking status: Some Days     Current packs/day: 0.00     Types: Cigarettes     Last attempt to quit: 1993     Years since quittin.9    Smokeless tobacco: Never    Tobacco comments:     occasionally   Vaping Use    Vaping status: Never Used   Substance and Sexual Activity    Alcohol use: Yes     Alcohol/week: 1.0 standard drink of alcohol     Types: 1 Standard drinks or equivalent per week     Comment: socially     Drug use: No    Sexual activity: Not Currently     Partners: Male     Birth control/protection: Tubal Ligation   Other Topics Concern    Caffeine Concern Yes     Comment: coffee, 1cup/day    Exercise No    Pt has a pacemaker No    Pt has a defibrillator No    Reaction to local anesthetic No   Social History Narrative    The patient does not use an assistive device..      The patient does live in a home with stairs.                  Physical Exam     ED Triage Vitals [11/24/24 1342]   /75   Pulse 78   Resp 18   Temp 98.4 °F (36.9 °C)   Temp src Temporal   SpO2 98 %   O2 Device None (Room air)       Current Vitals:   Vital Signs  BP: 105/75  Pulse: 78  Resp: 18  Temp: 98.4 °F (36.9 °C)  Temp src: Temporal    Oxygen Therapy  SpO2: 98 %  O2 Device: None (Room air)        Physical Exam  Vitals and nursing note reviewed.   Constitutional:       Appearance: She is well-developed.   HENT:      Head: Normocephalic and atraumatic.      Mouth/Throat:      Mouth: Mucous membranes are moist.      Pharynx: Oropharynx is clear.   Eyes:      Extraocular Movements: Extraocular movements intact.      Pupils: Pupils are equal, round, and reactive to light.   Cardiovascular:      Rate and Rhythm: Normal rate and regular rhythm.      Heart sounds: Normal heart sounds.      Comments: Bilateral radial, DP and PT pulses are 2+  Pulmonary:      Effort: Pulmonary effort is normal.      Breath sounds: Normal breath sounds.   Abdominal:      General: There is no distension.      Palpations: Abdomen is soft.      Tenderness: There is no abdominal tenderness.   Musculoskeletal:         General: Normal range of motion.      Cervical back: Normal range of motion and neck supple.      Comments: Tender to palpation at the left greater trochanter, with full passive range of motion of the left hip, knee and ankle.  Muscular compartments are soft at the left lower extremity.   Skin:     General: Skin is warm.   Neurological:      General: No  focal deficit present.      Mental Status: She is alert.      Cranial Nerves: No cranial nerve deficit.      Sensory: No sensory deficit.      Motor: No weakness.      Coordination: Coordination normal.      Comments: No focal deficits     Differential diagnosis includes but is not limited to hip fracture, contusion, low back fracture versus spasm versus sprain, ICH or skull fracture        ED Course   Labs Reviewed - No data to display         XR CHEST AP PORTABLE  (CPT=71045)    Result Date: 11/24/2024  CONCLUSION: No acute cardiopulmonary abnormality.    Dictated by (CST): Keyur Pérez MD on 11/24/2024 at 3:43 PM     Finalized by (CST): Keyur Pérez MD on 11/24/2024 at 3:45 PM          XR LUMBAR SPINE (MIN 2 VIEWS) (CPT=72100)    Result Date: 11/24/2024  CONCLUSION:   Degenerative disc and facet disease throughout the lumbar spine, most prominent at L5-S1.  Nondisplaced lower sacrum and upper coccygeal fracture.    Dictated by (CST): Keyur Pérez MD on 11/24/2024 at 3:40 PM     Finalized by (CST): Keyur Pérez MD on 11/24/2024 at 3:42 PM          XR HIP W OR WO PELVIS 2 OR 3 VIEWS, LEFT (CPT=73502)    Result Date: 11/24/2024  CONCLUSION: Unremarkable left hip radiographs.    Dictated by (CST): Keyur Pérez MD on 11/24/2024 at 3:38 PM     Finalized by (CST): Keyur Pérez MD on 11/24/2024 at 3:40 PM          CT BRAIN OR HEAD (CPT=70450)    Result Date: 11/24/2024  CONCLUSION: No acute intracranial abnormality.    Dictated by (CST): Keyur Pérez MD on 11/24/2024 at 3:21 PM     Finalized by (CST): Keyur Pérez MD on 11/24/2024 at 3:28 PM                MDM            Medical Decision Making  Patient is well-appearing and ambulatory.  Neurologically intact.  Per my independent interpretation of CT brain, no ICH.  X-ray lumbar spine with sacrococcygeal fracture.  Discussed donut pillow, pain medications as needed, short course of Norco as needed and outpatient follow-up.  Return precautions provided for neurologic  deficits, urinary or bowel retention or incontinence or other concerns.      Problems Addressed:  Closed fracture of sacrum and coccyx, initial encounter ([facility]): acute illness or injury with systemic symptoms    Amount and/or Complexity of Data Reviewed  Radiology: ordered and independent interpretation performed. Decision-making details documented in ED Course.    Risk  Prescription drug management.  Risk Details: [place]  reviewed-patient understands she should not taking clonazepam and Norco concurrently        Disposition and Plan     Clinical Impression:  1. Closed fracture of sacrum and coccyx, initial encounter ([facility])         Disposition:  Discharge  11/24/2024  3:51 pm    Follow-up:  Nallely Cates MD  [address] [place] [id number]  [phone]    Follow up in 1 week(s)            Medications Prescribed:  Current Discharge Medication List        START taking these medications    Details   HYDROcodone-acetaminophen 5-325 MG Oral Tab Take 1 tablet by mouth every 6 (six) hours as needed.  Qty: 10 tablet, Refills: 0    Associated Diagnoses: Closed fracture of sacrum and coccyx, initial encounter ([facility])                 Supplementary Documentation:

## 2024-11-24 NOTE — DISCHARGE INSTRUCTIONS
Please return to the emergency department if you develop severe pain that is not controlled by pain medications or if you are unable to walk because of pain or weakness.  Return to the emergency department immediately if you develop fevers, loss of bowel or bladder control (dribbling of urine or having accidents you would not normally have), inability to urinate, numbness of your genital or anal area, or weakness/numbness of your legs or arms as these could all be signs of a serious medical emergency.    Please take the medication as prescribed.  Please do not operate heavy machinery, drive a car, exercise, or perform important tasks while you are taking this medication as it can make you drowsy and prone to mental lapses.  This medication can be addictive so please only take as needed.  This medication can cause your breathing to dangerously slow down if you take too much, so please take only as prescribed.  Medication can make you constipated, so please stay well-hydrated and take fiber supplements.  Return to the emergency department if this medication does not control your pain and be sure to follow-up with your primary care provider as advised.  Be sure not to take your clonazepam while taking the Norco.

## 2024-11-24 NOTE — ED INITIAL ASSESSMENT (HPI)
Pt arrives via ems for a fall, +head injury, denies loc and blood thinners. Pt reports she slipped on a wet floor and slipped. Pt reports left sided body pain.

## 2025-02-12 ENCOUNTER — HOSPITAL ENCOUNTER (OUTPATIENT)
Dept: ULTRASOUND IMAGING | Age: 60
Discharge: HOME | End: 2025-02-12
Attending: OBSTETRICS & GYNECOLOGY
Payer: MEDICAID

## 2025-02-12 DIAGNOSIS — R10.2 PELVIC PAIN: ICD-10-CM

## 2025-02-12 DIAGNOSIS — D25.1 FIBROIDS, INTRAMURAL: ICD-10-CM

## 2025-02-12 PROCEDURE — 76856 US EXAM PELVIC COMPLETE: CPT | Performed by: OBSTETRICS & GYNECOLOGY

## 2025-02-12 PROCEDURE — 76830 TRANSVAGINAL US NON-OB: CPT | Performed by: OBSTETRICS & GYNECOLOGY

## 2025-02-15 ENCOUNTER — HOSPITAL ENCOUNTER (OUTPATIENT)
Dept: ULTRASOUND IMAGING | Age: 60
Discharge: HOME | End: 2025-02-15
Attending: INTERNAL MEDICINE
Payer: MEDICAID

## 2025-02-15 DIAGNOSIS — K82.4 GALLBLADDER POLYP: ICD-10-CM

## 2025-02-15 PROCEDURE — 76705 ECHO EXAM OF ABDOMEN: CPT | Performed by: INTERNAL MEDICINE

## 2025-02-27 ENCOUNTER — OFFICE VISIT (OUTPATIENT)
Dept: OTOLARYNGOLOGY | Facility: CLINIC | Age: 60
End: 2025-02-27

## 2025-02-27 VITALS — WEIGHT: 151.63 LBS | BODY MASS INDEX: 27 KG/M2

## 2025-02-27 DIAGNOSIS — Z87.898 HX OF DIZZINESS: ICD-10-CM

## 2025-02-27 DIAGNOSIS — J01.90 ACUTE NON-RECURRENT SINUSITIS, UNSPECIFIED LOCATION: ICD-10-CM

## 2025-02-27 DIAGNOSIS — R25.3 FASCICULATION OF TONGUE: Primary | ICD-10-CM

## 2025-02-27 PROCEDURE — 99213 OFFICE O/P EST LOW 20 MIN: CPT | Performed by: OTOLARYNGOLOGY

## 2025-02-27 RX ORDER — MONTELUKAST SODIUM 10 MG/1
10 TABLET ORAL NIGHTLY
Qty: 30 TABLET | Refills: 3 | Status: SHIPPED | OUTPATIENT
Start: 2025-02-27

## 2025-02-27 RX ORDER — CLONAZEPAM 0.5 MG/1
0.25 TABLET ORAL DAILY
Qty: 30 TABLET | Refills: 2 | Status: SHIPPED | OUTPATIENT
Start: 2025-02-27

## 2025-02-27 RX ORDER — AZELASTINE 1 MG/ML
2 SPRAY, METERED NASAL 2 TIMES DAILY
Qty: 30 ML | Refills: 0 | Status: SHIPPED | OUTPATIENT
Start: 2025-02-27
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
## 2025-03-07 ENCOUNTER — OFFICE VISIT (OUTPATIENT)
Dept: INTERNAL MEDICINE CLINIC | Facility: CLINIC | Age: 60
End: 2025-03-07

## 2025-03-07 VITALS
HEART RATE: 71 BPM | BODY MASS INDEX: 26.9 KG/M2 | OXYGEN SATURATION: 98 % | TEMPERATURE: 98 F | RESPIRATION RATE: 20 BRPM | SYSTOLIC BLOOD PRESSURE: 96 MMHG | HEIGHT: 63 IN | WEIGHT: 151.81 LBS | DIASTOLIC BLOOD PRESSURE: 66 MMHG

## 2025-03-07 DIAGNOSIS — R20.0 NUMBNESS OF LEFT ANTERIOR THIGH: ICD-10-CM

## 2025-03-07 DIAGNOSIS — M53.3 COCCYDYNIA: Primary | ICD-10-CM

## 2025-03-07 DIAGNOSIS — M25.552 PAIN OF LEFT HIP: ICD-10-CM

## 2025-03-07 DIAGNOSIS — E55.9 VITAMIN D DEFICIENCY: ICD-10-CM

## 2025-03-07 DIAGNOSIS — R10.9 RIGHT-SIDED ABDOMINAL PAIN OF UNKNOWN CAUSE: ICD-10-CM

## 2025-03-07 PROCEDURE — 99214 OFFICE O/P EST MOD 30 MIN: CPT | Performed by: INTERNAL MEDICINE

## 2025-03-07 RX ORDER — CYCLOBENZAPRINE HCL 5 MG
5 TABLET ORAL NIGHTLY PRN
COMMUNITY
Start: 2025-01-08

## 2025-03-07 RX ORDER — ERGOCALCIFEROL 1.25 MG/1
50000 CAPSULE, LIQUID FILLED ORAL WEEKLY
Qty: 12 CAPSULE | Refills: 0 | Status: SHIPPED | OUTPATIENT
Start: 2025-03-07

## 2025-03-07 RX ORDER — MELOXICAM 15 MG/1
15 TABLET ORAL DAILY PRN
COMMUNITY
Start: 2025-01-08

## 2025-03-07 NOTE — PROGRESS NOTES
Cristine Boggs is a 59 year old female with complaints of:  Chief Complaint: Ultrasound (F/u.) and Fall (F/u from fall patient recently had.)    HPI     Cristine Boggs is a(n) 59 year old female with a history of GERD, diverticulitis, fatty liver, GOEL, fibroids, onychomycosis, tongue fasciculations, who presents for follow-up.    Patient had a fall at the end of November 2024.  Was seen in the ER.  Noted to have a closed fracture to the sacrum and coccyx. CTH was WNL.  We can continue    Recent right upper quadrant ultrasound done.  Unremarkable sonographic appearance of the gallbladder was noted.  No cholelithiasis, no polyps.  Normal bile ducts.  No gross abnormalities seen of the liver, right kidney, and pancreatic head.    Coccyx pain has slowly started improving. Patient uses a donut pillow. However, she has been having left sided hip pain. She is undergoing PT for this.  She has some numbness and tingling in her left thigh.     Past Medical History     Past Medical History:    Acute pyelonephritis    Hospitalized 2 weeks    Anesthesia complication    pt reports low BP    Colon polyps    repeat CLN in 5 years    Disorder of liver    abnormal LFTs, + AMA    Esophageal reflux    Meniere disease    SVT (supraventricular tachycardia) (HCC)    Status post ablation    Vestibular disorder    Vitamin B12 deficiency    weekly B12 therapy        Past Surgical History     Past Surgical History:   Procedure Laterality Date    Colonoscopy  11/23/2011    per NG    Colonoscopy  2011        Colonoscopy N/A 4/14/2021    Procedure: COLONOSCOPY;  Surgeon: MARIKA Schmidt MD;  Location: [facility] ENDOSCOPY    Other      Abdominal fibroidectomy    Other surgical history  05/20/2022    DNC    Tubal ligation Bilateral 1990/1991/1993        Family History     Family History   Problem Relation Age of Onset    Stroke Father 72        CVA    Hypertension Father     Stroke Mother 79        CVA    Hypertension Mother     Diabetes  Sister         type 2 - half (P)    Colon Cancer Neg         close relative    Glaucoma Neg     Macular degeneration Neg        Social History     Social History     Socioeconomic History    Marital status:    Occupational History    Occupation: LPN   Tobacco Use    Smoking status: Some Days     Current packs/day: 0.00     Types: Cigarettes     Last attempt to quit: 1993     Years since quittin.2    Smokeless tobacco: Never    Tobacco comments:     occasionally   Vaping Use    Vaping status: Never Used   Substance and Sexual Activity    Alcohol use: Yes     Alcohol/week: 1.0 standard drink of alcohol     Types: 1 Standard drinks or equivalent per week     Comment: socially    Drug use: No    Sexual activity: Not Currently     Partners: Male     Birth control/protection: Tubal Ligation   Other Topics Concern    Caffeine Concern Yes     Comment: coffee, 1cup/day    Exercise No    Pt has a pacemaker No    Pt has a defibrillator No    Reaction to local anesthetic No   Social History Narrative    The patient does not use an assistive device..      The patient does live in a home with stairs.       Allergies     Allergies[1]    Current Medications     Current Outpatient Medications   Medication Sig Dispense Refill    cyclobenzaprine 5 MG Oral Tab Take 1 tablet (5 mg total) by mouth nightly as needed for Muscle spasms.      Meloxicam 15 MG Oral Tab Take 1 tablet (15 mg total) by mouth daily as needed for Pain.      loratadine-pseudoephedrine ER 5-120 MG Oral Tablet 12 Hr Take 1 tablet by mouth every 12 (twelve) hours. 60 tablet 3    clonazePAM 0.5 MG Oral Tab Take 0.5 tablets (0.25 mg total) by mouth daily. 30 tablet 2    triamcinolone 0.1 % External Ointment Apply 1 Application topically 2 (two) times daily.      nystatin 100,000 Units/g External Ointment Apply 1 Application topically 2 (two) times daily. 15 g 0    pantoprazole 40 MG Oral Tab EC Take 1 tablet (40 mg total) by mouth before breakfast.       meclizine 25 MG Oral Tab Take 1 tablet (25 mg total) by mouth 3 (three) times daily as needed. 90 tablet 1    montelukast 10 MG Oral Tab Take 1 tablet (10 mg total) by mouth nightly. 30 tablet 3    azelastine 0.1 % Nasal Solution 2 sprays by Nasal route 2 (two) times daily. 30 mL 3    omeprazole 20 MG Oral Capsule Delayed Release Take 1 capsule (20 mg total) by mouth before breakfast. 90 capsule 0    azelastine 0.1 % Nasal Solution 2 sprays by Nasal route 2 (two) times daily. 30 mL 0    montelukast 10 MG Oral Tab Take 1 tablet (10 mg total) by mouth nightly. 30 tablet 3    ergocalciferol 1.25 MG (88587 UT) Oral Cap Take 1 capsule (50,000 Units total) by mouth once a week. (Patient not taking: Reported on 3/7/2025) 8 capsule 0    estradiol 0.1 MG/GM Vaginal Cream Place 1 g vaginally twice a week. (Patient not taking: Reported on 3/7/2025) 42.5 g 0     No current facility-administered medications for this visit.       Review of Systems     GENERAL HEALTH: feels well otherwise  SKIN: denies any unusual skin lesions or rashes  RESPIRATORY: denies shortness of breath with exertion  CARDIOVASCULAR: denies chest pain on exertion  GI: denies abdominal pain and denies heartburn  : denies any burning with urination, urinary frequency or urgency  NEURO: denies headaches, numbness or tingling, mental status changes  PSYCH: denies depressed mood, anxiety  MUSC: denies muscle aches, joint pain    Physical Exam     BP 96/66   Pulse 71   Wt 151 lb 12.8 oz (68.9 kg)   BMI 26.89 kg/m²     GENERAL: well developed, well nourished,in no apparent distress  SKIN: no rashes,no suspicious lesions  HEENT: atraumatic, normocephalic,ears and throat are clear  NECK: supple,no adenopathy,no bruits  LUNGS: clear to auscultation  CARDIO: RRR without murmur  GI: good BS's,no masses, HSM or tenderness  EXTREMITIES: no cyanosis, clubbing or edema    Assessment and Plan     Assessment & Plan  Coccydynia  Patient's coccydynia is improving,  patient.  Has persistent pain of the left hip, and down her left thigh.  She is following with the pain management physician now, with whom she is not very happy.  Follow with physiatry if feasible.  Referral given.  Orders:    Physiatry Referral - In Network    Pain of left hip  Patient's coccydynia is improving, patient.  Has persistent pain of the left hip, and down her left thigh.  She is following with the pain management physician now, with whom she is not very happy.  Follow with physiatry if feasible.  Referral given.  Orders:    Physiatry Referral - In Network    Numbness of left anterior thigh  Patient's coccydynia is improving, patient.  Has persistent pain of the left hip, and down her left thigh.  She is following with the pain management physician now, with whom she is not very happy.  Follow with physiatry if feasible.  Referral given.  Orders:    Physiatry Referral - In Network    Right-sided abdominal pain of unknown cause  Unclear etiology.  Right upper quadrant ultrasound appears normal.  CT abdomen pelvis done in fall 2024 also reviewed; no sinister pathology seen on there.       Vitamin D deficiency  Can do another round of 50,000 international units weekly for 12 weeks.  Will plan to recheck levels thereafter.  After patient completes a 50,000 international units weekly, can start 0488-2553 international units over-the-counter daily.             cyclobenzaprine 5 MG Oral Tab Take 1 tablet (5 mg total) by mouth nightly as needed for Muscle spasms.      Meloxicam 15 MG Oral Tab Take 1 tablet (15 mg total) by mouth daily as needed for Pain.      loratadine-pseudoephedrine ER 5-120 MG Oral Tablet 12 Hr Take 1 tablet by mouth every 12 (twelve) hours. 60 tablet 3    clonazePAM 0.5 MG Oral Tab Take 0.5 tablets (0.25 mg total) by mouth daily. 30 tablet 2    triamcinolone 0.1 % External Ointment Apply 1 Application topically 2 (two) times daily.      nystatin 100,000 Units/g External Ointment Apply 1  Application topically 2 (two) times daily. 15 g 0    pantoprazole 40 MG Oral Tab EC Take 1 tablet (40 mg total) by mouth before breakfast.      meclizine 25 MG Oral Tab Take 1 tablet (25 mg total) by mouth 3 (three) times daily as needed. 90 tablet 1    montelukast 10 MG Oral Tab Take 1 tablet (10 mg total) by mouth nightly. 30 tablet 3    azelastine 0.1 % Nasal Solution 2 sprays by Nasal route 2 (two) times daily. 30 mL 3    omeprazole 20 MG Oral Capsule Delayed Release Take 1 capsule (20 mg total) by mouth before breakfast. 90 capsule 0       Requested Prescriptions      No prescriptions requested or ordered in this encounter       No orders of the defined types were placed in this encounter.      No follow-ups on file.    The patient indicates understanding of these issues and agrees to the plan.    Electronically signed by Nallely Cates MD 03/07/25             [1]   Allergies  Allergen Reactions    Lawson Flavor RASH    Strawberry RASH

## 2025-04-23 ENCOUNTER — OFFICE VISIT (OUTPATIENT)
Dept: INTERNAL MEDICINE CLINIC | Facility: CLINIC | Age: 60
End: 2025-04-23

## 2025-04-23 VITALS
OXYGEN SATURATION: 97 % | HEART RATE: 86 BPM | SYSTOLIC BLOOD PRESSURE: 109 MMHG | RESPIRATION RATE: 18 BRPM | BODY MASS INDEX: 27.57 KG/M2 | HEIGHT: 63 IN | TEMPERATURE: 98 F | DIASTOLIC BLOOD PRESSURE: 71 MMHG | WEIGHT: 155.63 LBS

## 2025-04-23 DIAGNOSIS — R42 DIZZINESS: ICD-10-CM

## 2025-04-23 DIAGNOSIS — E55.9 VITAMIN D DEFICIENCY: ICD-10-CM

## 2025-04-23 DIAGNOSIS — M53.3 COCCYDYNIA: Primary | ICD-10-CM

## 2025-04-23 PROCEDURE — 99214 OFFICE O/P EST MOD 30 MIN: CPT | Performed by: INTERNAL MEDICINE

## 2025-04-23 RX ORDER — ACETAMINOPHEN 650 MG/1
600 SUPPOSITORY RECTAL AS NEEDED
COMMUNITY
End: 2025-04-23

## 2025-04-23 NOTE — PROGRESS NOTES
Cristine Boggs is a 59 year old female with complaints of:  Chief Complaint: Back Pain (Lower back pain. ) and Follow - Up (Patient would like to review her MRI results at todays visit.)    HPI     Cristine Boggs is a(n) 59 year old female with a history of  GERD, diverticulitis, fatty liver, GOEL, fibroids, onychomycosis, tongue fasciculations, who presents for back pain.     Patient continues to have sacral pain. Cannot sit directly on her buttocks. She was told by a pain management specialist that she needs to follow with a spine specialist. She currently following with PT.     She has been noting that she has been having ongoing dizziness. She has been referred to a neurologist for this, but she would like a second opinion.     She has been having headaches since she fell in 11/2024. She had a headache when she went on vacation. She had a one sided pounding headache behind her left eye. She did have photophobia. No nausea. Did eventually get better with Tylenol.     Past Medical History     Past Medical History[1]     Past Surgical History     Past Surgical History[2]     Family History     Family History[3]    Social History     Short Social Hx on File[4]    Allergies     Allergies[5]    Current Medications     Current Outpatient Medications   Medication Sig Dispense Refill    acetaminophen 650 MG Rectal Suppos Place 600 mg rectally as needed for Fever.      cyclobenzaprine 5 MG Oral Tab Take 1 tablet (5 mg total) by mouth nightly as needed for Muscle spasms.      Meloxicam 15 MG Oral Tab Take 1 tablet (15 mg total) by mouth daily as needed for Pain.      ergocalciferol 1.25 MG (00672 UT) Oral Cap Take 1 capsule (50,000 Units total) by mouth once a week. 12 capsule 0    clonazePAM 0.5 MG Oral Tab Take 0.5 tablets (0.25 mg total) by mouth daily. 30 tablet 2    triamcinolone 0.1 % External Ointment Apply 1 Application topically 2 (two) times daily.      nystatin 100,000 Units/g External Ointment Apply 1  Application topically 2 (two) times daily. 15 g 0    pantoprazole 40 MG Oral Tab EC Take 1 tablet (40 mg total) by mouth before breakfast.      meclizine 25 MG Oral Tab Take 1 tablet (25 mg total) by mouth 3 (three) times daily as needed. 90 tablet 1    montelukast 10 MG Oral Tab Take 1 tablet (10 mg total) by mouth nightly. 30 tablet 3    azelastine 0.1 % Nasal Solution 2 sprays by Nasal route 2 (two) times daily. 30 mL 3    omeprazole 20 MG Oral Capsule Delayed Release Take 1 capsule (20 mg total) by mouth before breakfast. 90 capsule 0    loratadine-pseudoephedrine ER 5-120 MG Oral Tablet 12 Hr Take 1 tablet by mouth every 12 (twelve) hours. (Patient not taking: Reported on 4/23/2025) 60 tablet 3     No current facility-administered medications for this visit.       Review of Systems     GENERAL HEALTH: feels well otherwise  SKIN: denies any unusual skin lesions or rashes  RESPIRATORY: denies shortness of breath with exertion  CARDIOVASCULAR: denies chest pain on exertion  GI: denies abdominal pain and denies heartburn  : denies any burning with urination, urinary frequency or urgency  NEURO: denies headaches, numbness or tingling, mental status changes  PSYCH: denies depressed mood, anxiety  MUSC: denies muscle aches, joint pain    Physical Exam     /71 (BP Location: Left arm, Patient Position: Sitting, Cuff Size: adult)   Pulse 86   Temp 98 °F (36.7 °C) (Temporal)   Resp 18   Ht 5' 3\" (1.6 m)   Wt 155 lb 9.6 oz (70.6 kg)   SpO2 97%   BMI 27.56 kg/m²     GENERAL: well developed, well nourished,in no apparent distress  SKIN: no rashes,no suspicious lesions  HEENT: atraumatic, normocephalic,ears and throat are clear  NECK: supple,no adenopathy,no bruits  LUNGS: clear to auscultation  CARDIO: RRR without murmur  GI: good BS's,no masses, HSM or tenderness  EXTREMITIES: no cyanosis, clubbing or edema    Assessment and Plan     Assessment & Plan  Coccydynia  Continue off loading. Follow with PT.  Referral to neurosurgery given.   Orders:    Neurosurgery Referral - In Network    Dizziness  Follow with neurology.   Orders:    Neuro Referral - In Network    Vitamin D deficiency  Recheck levels.   Orders:    Vitamin D; Future         Current Medications[6]    Requested Prescriptions      No prescriptions requested or ordered in this encounter       Orders Placed This Encounter   Procedures    Vitamin D     Standing Status:   Future     Expected Date:   4/23/2025     Expiration Date:   4/23/2026     Please pick the scenario that best fits the purpose for ordering this test:   General Screening/Vit D deficiency (25-Hydroxy)     Release to patient:   Immediate       No follow-ups on file.    The patient indicates understanding of these issues and agrees to the plan.    Electronically signed by Nallely Cates MD 04/23/25             [1]   Past Medical History:   Acute pyelonephritis    Hospitalized 2 weeks    Anesthesia complication    pt reports low BP    Colon polyps    repeat CLN in 5 years    Disorder of liver    abnormal LFTs, + AMA    Esophageal reflux    Meniere disease    SVT (supraventricular tachycardia) (HCC)    Status post ablation    Vestibular disorder    Vitamin B12 deficiency    weekly B12 therapy   [2]   Past Surgical History:  Procedure Laterality Date    Colonoscopy  11/23/2011    per     Colonoscopy  2011        Colonoscopy N/A 4/14/2021    Procedure: COLONOSCOPY;  Surgeon: MARIKA Schmidt MD;  Location: [facility] ENDOSCOPY    Other      Abdominal fibroidectomy    Other surgical history  05/20/2022    DNC    Tubal ligation Bilateral 1990/1991/1993   [3]   Family History  Problem Relation Age of Onset    Stroke Father 72        CVA    Hypertension Father     Stroke Mother 79        CVA    Hypertension Mother     Diabetes Sister         type 2 - half (P)    Colon Cancer Neg         close relative    Glaucoma Neg     Macular degeneration Neg    [4]   Social History  Socioeconomic History    Marital  status:    Occupational History    Occupation: LPN   Tobacco Use    Smoking status: Some Days     Current packs/day: 0.00     Types: Cigarettes     Last attempt to quit: 1993     Years since quittin.3    Smokeless tobacco: Never    Tobacco comments:     occasionally   Vaping Use    Vaping status: Never Used   Substance and Sexual Activity    Alcohol use: Yes     Alcohol/week: 1.0 standard drink of alcohol     Types: 1 Standard drinks or equivalent per week     Comment: socially    Drug use: No    Sexual activity: Not Currently     Partners: Male     Birth control/protection: Tubal Ligation   Other Topics Concern    Caffeine Concern Yes     Comment: coffee, 1cup/day    Exercise No    Pt has a pacemaker No    Pt has a defibrillator No    Reaction to local anesthetic No   Social History Narrative    The patient does not use an assistive device..      The patient does live in a home with stairs.   [5]   Allergies  Allergen Reactions    Lawson Flavor RASH    Strawberry RASH   [6]    cyclobenzaprine 5 MG Oral Tab Take 1 tablet (5 mg total) by mouth nightly as needed for Muscle spasms.      Meloxicam 15 MG Oral Tab Take 1 tablet (15 mg total) by mouth daily as needed for Pain.      ergocalciferol 1.25 MG (63846 UT) Oral Cap Take 1 capsule (50,000 Units total) by mouth once a week. 12 capsule 0    clonazePAM 0.5 MG Oral Tab Take 0.5 tablets (0.25 mg total) by mouth daily. 30 tablet 2    triamcinolone 0.1 % External Ointment Apply 1 Application topically 2 (two) times daily.      nystatin 100,000 Units/g External Ointment Apply 1 Application topically 2 (two) times daily. 15 g 0    pantoprazole 40 MG Oral Tab EC Take 1 tablet (40 mg total) by mouth before breakfast.      meclizine 25 MG Oral Tab Take 1 tablet (25 mg total) by mouth 3 (three) times daily as needed. 90 tablet 1    montelukast 10 MG Oral Tab Take 1 tablet (10 mg total) by mouth nightly. 30 tablet 3    azelastine 0.1 % Nasal Solution 2 sprays by  Nasal route 2 (two) times daily. 30 mL 3    omeprazole 20 MG Oral Capsule Delayed Release Take 1 capsule (20 mg total) by mouth before breakfast. 90 capsule 0

## 2025-04-29 ENCOUNTER — TELEPHONE (OUTPATIENT)
Dept: SURGERY | Facility: CLINIC | Age: 60
End: 2025-04-29

## 2025-04-29 NOTE — TELEPHONE ENCOUNTER
Patient dropped off imaging.     Location imaging was performed at: [facility] in [place], [place]  Studies completed: CT pelvis w/o contrast 12/28/24, MRI lumbar spine w/o contrast 12/10/24, MRI sacrum/coccyx- contrast 12/10/24    Imaging has been uploaded to PACS. Patient has upcoming appointment on 5/1/25.

## 2025-05-01 ENCOUNTER — OFFICE VISIT (OUTPATIENT)
Dept: SURGERY | Facility: CLINIC | Age: 60
End: 2025-05-01
Payer: MEDICAID

## 2025-05-01 VITALS
HEART RATE: 67 BPM | SYSTOLIC BLOOD PRESSURE: 112 MMHG | BODY MASS INDEX: 27 KG/M2 | WEIGHT: 155 LBS | DIASTOLIC BLOOD PRESSURE: 73 MMHG

## 2025-05-01 DIAGNOSIS — S33.6XXD SPRAIN OF SACROILIAC LIGAMENT, SUBSEQUENT ENCOUNTER: ICD-10-CM

## 2025-05-01 DIAGNOSIS — M70.62 TROCHANTERIC BURSITIS OF LEFT HIP: Primary | ICD-10-CM

## 2025-05-01 PROCEDURE — 99205 OFFICE O/P NEW HI 60 MIN: CPT | Performed by: STUDENT IN AN ORGANIZED HEALTH CARE EDUCATION/TRAINING PROGRAM

## 2025-05-01 NOTE — H&P
[facility]  Neurological Surgery New Patient Clinic Note    Cristine Boggs  7/18/1965  [id number]  PCP: Nallely Cates MD  Referring Provider: Nallely Cates MD    REASON FOR VISIT:  Persistent left hip and buttock pain since a slip-and-fall.    HISTORY OF PRESENT ILLNESS 5/1/2025:  Cristine Boggs is a(n) 59 year old female presenting with ongoing left hip and buttock pain that began after she slipped and fell on a soapy bathroom floor on 11/24/2024. She reports landing directly on her left side and initially felt left hip and sacral pain. She later developed episodic dizziness, neck pain, and left arm discomfort, which has since improved with therapy.  She has had multiple imaging studies, including initial X-rays of the lumbar spine suggesting a possible nondisplaced fracture of the coccyx/sacrum, though subsequent CT and MRI of the pelvis did not confirm any acute fracture lines. Instead, imaging demonstrated degenerative changes at the sacroiliac (SI) joints, a chronic left far lateral L5-S1 disc herniation, and trochanteric bursitis was suspected clinically. She occasionally experiences electric-like shooting sensations in the left buttock and lateral thigh but does not describe a strict dermatomal pattern or foot numbness/weakness. She has been receiving physical therapy, which has helped to a degree. She has used NSAIDs and muscle relaxants with partial pain relief. She denies any bowel or bladder incontinence, saddle anesthesia, or debilitating radicular leg pain.     PAST MEDICAL HISTORY:  Past Medical History[1]    PAST SURGICAL HISTORY:  Past Surgical History[2]    FAMILY HISTORY:  family history includes Diabetes in her sister; Hypertension in her father and mother; Stroke (age of onset: 72) in her father; Stroke (age of onset: 79) in her mother.    SOCIAL HISTORY:   reports that she has been smoking cigarettes. She has never used smokeless tobacco. She reports current  alcohol use of about 1.0 standard drink of alcohol per week. She reports that she does not use drugs.    ALLERGIES:  Allergies[3]    MEDICATIONS:  Medications Ordered Prior to Encounter[4]    REVIEW OF SYSTEMS:  All other systems were reviewed and were negative except for those previously mentioned in the HPI    PHYSICAL EXAMINATION:  General: No acute distress.  Respiratory: Non-labored respirations bilaterally. No audible wheezing  Cardiovascular: Extremities warm and well-perfused.  Abdomen: Soft, nontender, nondistended.   Musculoskeletal: Moves all extremities well, symmetrically.  Extremities: No edema.    NEUROLOGIC EXAMINATION:  Mental status: Alert and oriented x 3  Speech: Clear, fluent  Cranial nerves: PERRLA, EOMI, face symmetric, with normal strength and sensation, tongue and palate midline, SCM 5/5 bilaterally  Motor:     RIGHT  Delt 5/5   Bic 5/5  Tri 5/5   HI 5/5    5/5  IP 5/5   Quad 5/5   Ham 5/5   AT 5/5   EHL 5/5 Yuliya 5/5     LEFT    Delt 5/5   Bic 5/5  Tri 5/5   HI 5/5    5/5  IP 5/5   Quad 5/5   Ham 5/5   AT 5/5   EHL 5/5 Yuliya 5/5   No pronator drift  Tone: Normal  Atrophy/Fasciculations: None  Sensation: Normal to light touch, symmetric, no neglect  Cerebellar: Normal finger nose finger  Gait: Normal, nondistressed heel toe tandem gait      Reflexes: 2+ throughout, symmetric, no Samson's  Musculoskeletal: Tenderness over the left lateral hip region, consistent with trochanteric bursitis. Palpable discomfort around the left SI joint. No visible deformities.    IMAGING:  CT brain 11/24/2024: Negative for any acute intracranial pathology.   XR lumbar 11/24/2024: Possible nondisplaced fracture of the upper coccyx/lower sacrum, degenerative changes at L5-S1.  MR lumbar and sacrum 12/10/2024: No evidence of acute fracture; mild edema around L5-S1 with a chronic disc protrusion that does not severely impinge the nerve root.  CT pelvis 12/28/2024: No definitive fracture lines; mild  subcortical sclerosis and arthropathic changes in bilateral SI joints. Chronic calcified left far lateral L5-S1 disc.      ASSESSMENT:  Ms. Cristine Boggs is a 59-year-old new patient presenting with persistent left-sided hip/buttock pain following a slip-and-fall approximately five months ago. Her imaging studies suggest a chronic far lateral L5-S1 disc herniation predating the fall, along with mild degenerative changes in the SI joints, particularly on the left side. Her current symptoms and exam are more consistent with greater trochanteric bursitis and superimposed sacroiliitis rather than radicular pain from the lumbar spine. No acute surgical pathology is identified, and her primary limiting factor is mechanical/soft tissue inflammation.    PLAN:  - Continue formal physical therapy for hip abductor strengthening, SI joint stabilization, and range of motion exercises.  - Refer to physiatry for possible targeted injections (e.g., left trochanteric bursa or left SI joint) to address persistent pain and inflammation.  - Maintain use of anti-inflammatories as tolerated; consider short courses of muscle relaxants if needed for spasm relief.  - Reinforced safe activity modification, appropriate stretching, and ergonomic strategies at home.  - Educated the patient on the chronic nature of the left far lateral L5-S1 disc herniation, which is not likely the primary cause of her pain.  - Follow up in neurosurgery only as needed if she develops progressive or new neurological symptoms.    Bryan Ramos MD  Neurological Surgery    [facility]  [address] [place] [address], [facility] [address] [place] [id number]  [phone]  Pager [phone]  5/1/2025 11:02 AM      This note was created using a voice-recognition transcribing system. Incorrect words or phrases may have been missed during proofreading. Please interpret accordingly.    Total Time    New Patient Total Time       60  minutes.       Activities       Preparing to see the patient (chart/tests/imaging review).       Obtaining and/or reviewing separately obtained history.       Performing a medically appropriate examination and/or evaluation.       Counseling and educating the patient/family/caregiver.       Ordering medications, tests, or procedures.       Referring and communicating with other health care professionals (when not separately reported).       Documenting clincal information in the electronic or other health record.       Independently interpreting results (not separately reported).    Communicating results to the patient/family/caregiver.    Care coordination (not separately reported).         [1]   Past Medical History:   Acute pyelonephritis    Hospitalized 2 weeks    Anesthesia complication    pt reports low BP    Colon polyps    repeat CLN in 5 years    Disorder of liver    abnormal LFTs, + AMA    Esophageal reflux    Meniere disease    SVT (supraventricular tachycardia) (HCC)    Status post ablation    Vestibular disorder    Vitamin B12 deficiency    weekly B12 therapy   [2]   Past Surgical History:  Procedure Laterality Date    Colonoscopy  11/23/2011    per     Colonoscopy  2011        Colonoscopy N/A 4/14/2021    Procedure: COLONOSCOPY;  Surgeon: MARIKA Schmidt MD;  Location: [facility] ENDOSCOPY    Other      Abdominal fibroidectomy    Other surgical history  05/20/2022    DNC    Tubal ligation Bilateral 1990/1991/1993   [3]   Allergies  Allergen Reactions    Lawson Flavor RASH    Strawberry RASH   [4]   Current Outpatient Medications on File Prior to Visit   Medication Sig Dispense Refill    cyclobenzaprine 5 MG Oral Tab Take 1 tablet (5 mg total) by mouth nightly as needed for Muscle spasms.      Meloxicam 15 MG Oral Tab Take 1 tablet (15 mg total) by mouth daily as needed for Pain.      ergocalciferol 1.25 MG (52328 UT) Oral Cap Take 1 capsule (50,000 Units total) by mouth once a week. 12 capsule 0    clonazePAM  0.5 MG Oral Tab Take 0.5 tablets (0.25 mg total) by mouth daily. 30 tablet 2    triamcinolone 0.1 % External Ointment Apply 1 Application topically 2 (two) times daily.      nystatin 100,000 Units/g External Ointment Apply 1 Application topically 2 (two) times daily. 15 g 0    pantoprazole 40 MG Oral Tab EC Take 1 tablet (40 mg total) by mouth before breakfast.      meclizine 25 MG Oral Tab Take 1 tablet (25 mg total) by mouth 3 (three) times daily as needed. 90 tablet 1    montelukast 10 MG Oral Tab Take 1 tablet (10 mg total) by mouth nightly. 30 tablet 3    azelastine 0.1 % Nasal Solution 2 sprays by Nasal route 2 (two) times daily. 30 mL 3    omeprazole 20 MG Oral Capsule Delayed Release Take 1 capsule (20 mg total) by mouth before breakfast. 90 capsule 0     No current facility-administered medications on file prior to visit.

## 2025-05-01 NOTE — PROGRESS NOTES
New patient presents for injury that occurred Nov 2024; she had sacrum and coccyx injury. She is having left sided hip/bone pain, muscle spasms, shooting pain down left leg.     Admits to N/T in the left leg that radiates into the foot  Denies weakness  She also has dizziness and some 'pressure in the head' she states, 'always dizzy', having headaches since her fall in Nov 2024; she saw a neurologist but she did not have a good experience; she was prescribed vestibular therapy and pain meds for headaches    Patient is taking meloxicam and cyclobenzaprine for her pain, Tylenol for headaches   H/o of liver disease

## 2025-05-06 ENCOUNTER — MED REC SCAN ONLY (OUTPATIENT)
Dept: SURGERY | Facility: CLINIC | Age: 60
End: 2025-05-06

## 2025-05-28 RX ORDER — ERGOCALCIFEROL 1.25 MG/1
50000 CAPSULE, LIQUID FILLED ORAL WEEKLY
Qty: 12 CAPSULE | Refills: 0 | OUTPATIENT
Start: 2025-05-28

## 2025-05-28 NOTE — TELEPHONE ENCOUNTER
Please Review. Protocol Failed; No Protocol   Recent Labs:  Lab Results   Component Value Date    VITD 25.3 (L) 11/18/2024     Requested Prescriptions   Pending Prescriptions Disp Refills    ERGOCALCIFEROL 1.25 MG (61494 UT) Oral Cap [Pharmacy Med Name: Vitamin D (Ergocalciferol) 1.25 MG (62501 UT) Oral Capsule] 12 capsule 0     Sig: Take 1 capsule by mouth once a week       There is no refill protocol information for this order

## 2025-05-30 NOTE — TELEPHONE ENCOUNTER
Left detailed message on patient's voicemail, ok per verbal release form. Advised on message to call and speak to a nurse with questions. Office phone number and hours provided.

## 2025-06-07 ENCOUNTER — TELEPHONE (OUTPATIENT)
Age: 60
End: 2025-06-07

## 2025-06-07 NOTE — TELEPHONE ENCOUNTER
Hello,  Sorry I missed you - I am reaching out from the [facility] Navigation department, following up on an order from your provider's office to assist in connecting you with resources for care. If you would like to discuss this further, please give us a call back at [phone], or for more immediate assistance you can contact our 24-hour help line at [phone] We look forward to hearing from you soon.
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
## 2025-06-17 ENCOUNTER — TELEPHONE (OUTPATIENT)
Dept: OBGYN CLINIC | Facility: CLINIC | Age: 60
End: 2025-06-17

## 2025-06-17 NOTE — TELEPHONE ENCOUNTER
Left message to call back     Cristine should keep appointment to review ultrasound, polyp noted so he may want to discuss options for treatment.

## 2025-06-17 NOTE — TELEPHONE ENCOUNTER
Appointment rescheduled to 7/16 due to provider unavailable on 7/22. Patient wanted to discuss ultrasound results from beginning of year. Is appointment necessary or can results be discussed over phone. Patient stated she has left several messages but does not receive return call.

## 2025-06-26 NOTE — TELEPHONE ENCOUNTER
Spoke with patient and discussed 2022 ultrasound and 2025 ultrasound. This finding is likely the base of the fibroid I removed in 2022. Reassured unless bleeding recurs. Can RN cancel her appointment with me? THanks.

## 2025-07-16 NOTE — TELEPHONE ENCOUNTER
Patient answered call, states she will look at calendar/schedule and call to schedule procedure.

## 2025-07-16 NOTE — PROGRESS NOTES
[facility]  OFFICE FOLLOW UP EVALUATION      HISTORY OF PRESENT ILLNESS:     Chief Complaint   Patient presents with    Follow - Up     LOV: 8/24/23 Pt here for R shoulder pain and BL low back pain due to fracture of coccyx in November 2024. Admits pain radiating down tailbone and L thigh 6-8/10. Admits dizziness, disorientation, and constant temporal HA, pain 6/10.    Current Medication: Tylenol, Advil, meloxicam, muscle relaxer  Patient gives verbal consent to use [employer].        History of Present Illness  Cristine Boggs is a 59 year old female who presents with lower back pain following a fall resulting in a coccyx fracture.      On November 24, 2023, she slipped and fell in a furniture store bathroom due to a wet floor, resulting in a fracture of the coccyx. An X-ray confirmed a nondisplaced lower sacrum and upper coccygeal fracture. Initially, she did not experience pain, but the following day she developed significant pain and a large bruise on her left thigh.    She experiences ongoing lower back pain that sometimes radiates into the left buttock and down the left leg to behind the knee. The pain is rated as 6 out of 10 and can occur suddenly, often triggered by activities such as lifting. She describes a sensation that is not quite numbness or tingling but as 'something weird' that travels down the leg. She occasionally experiences debilitating spasms. She has been taking meloxicam for pain management. She has undergone an MRI of her lower back and has not found significant relief from physical therapy post-fall.  She has seen neurosurgery and was diagnosed with greater trochanteric bursitis and sacroiliac joint pain and not recommending any surgical intervention at this time.    She mentions a bruise and swelling on the lateral aspect of her left hip, which she attributes to bursitis. She describes the pain as feeling like her leg is 'getting out of the  joint' but notes that this is less bothersome than her lower back pain.        PHYSICAL EXAM:     Ht 63\"   Wt 155 lb (70.3 kg)   BMI 27.46 kg/m²     FAROM of the lumbar spine with pain with end range of flexion and extension in the axial lumbar spine.  Positive facet loading bilaterally.  Strength is 5 out of 5 in bilateral lower extremities, sensation intact to light touch.  Reflexes +2 and symmetric at patella and Achilles bilaterally.  There is a noticeable swelling along the left greater trochanter and lateral hip with tenderness to palpation of this area.  It does not feel warm to touch or erythematous.    IMAGING:     MRI lumbar spine was reviewed which is notable for degenerative disc disease in the lower segments with mild left lateral disc protrusion at L3-4 with no significant spinal or foraminal narrowing and an annular disc tear at L4-5 with no significant spinal or foraminal narrowing and a left lateral foraminal disc protrusion at L5-S1 with foraminal stenosis but no significant spinal stenosis.  There is lumbar facet arthropathy.    X-ray lumbar spine completed 11/24/2024 was reviewed which is notable for nondisplaced lower sacrum over coccygeal fracture    All imaging results were reviewed and discussed with patient.      ASSESSMENT/PLAN:     1. Arthropathy of lumbar facet joint    2. Closed fracture of sacrum, unspecified fracture morphology, initial encounter ([facility])    3. Hematoma and contusion        Assessment & Plan  Lumbar facet arthropathy with a left foraminal disc at L5-S1.  -At this time, her pain is multifactorial including from the facet loading of the joints as well as from the sacroiliac joints.  I am recommending bilateral lower lumbar facet joint injection under fluoroscopy guidance under IV conscious sedation for situational anxiety.    Closed nondisplaced lower sacrum and upper coccygeal fracture  Fracture from fall on November 24, 2023. Pain improved but remains at 8/10.  - Order  MRI of sacrum and coccyx to assess fracture healing.    Greater trochanteric bursitis with hematoma  Swelling and pain at greater trochanteric bursa, likely hematoma from fall. Possible Henley-Nichol lesion.  - Order MRI of hip to evaluate hematoma and rule out Henley-Nichol lesion.      The patient verbalized understanding with the plan and was in agreement. All questions/concerns were addressed and there were no barriers to learning.  Please note Dragon dictation software was used to dictate this note and may result in inadvertent typos.    Betito Smith DO, FAAPMR & CAQSM  Physical Medicine and Rehabilitation  Sports and Spine Medicine    PAST MEDICAL HISTORY:   Past Medical History[1]      PAST SURGICAL HISTORY:   Past Surgical History[2]      CURRENT MEDICATIONS:   Current Medications[3]      ALLERGIES:   Allergies[4]      FAMILY HISTORY:   Family History[5]       SOCIAL HISTORY:   Short Social Hx on File[6]       REVIEW OF SYSTEMS:   A comprehensive 10 point review of systems was completed.  Pertinent positives and negatives noted in the the HPI.      LABS:     Lab Results   Component Value Date     11/18/2024    A1C 5.4 11/18/2024     Lab Results   Component Value Date    WBC 6.1 11/18/2024    RBC 4.71 11/18/2024    HGB 13.5 11/18/2024    HCT 40.9 11/18/2024    MCV 86.8 11/18/2024    MCH 28.7 11/18/2024    MCHC 33.0 11/18/2024    RDW 12.5 11/18/2024    .0 11/18/2024    MPV 8.8 10/08/2018     Lab Results   Component Value Date    GLU 87 11/18/2024    BUN 13 11/18/2024    BUNCREA 19.1 11/18/2024    CREATSERUM 0.68 11/18/2024    ANIONGAP 7 11/18/2024    GFRNAA 94 05/16/2022    GFRAA 108 05/16/2022    CA 10.4 11/18/2024    OSMOCALC 291 11/18/2024    ALKPHO 80 11/18/2024    AST 26 11/18/2024    ALT 30 11/18/2024    ALKPHOS 59 06/05/2015    BILT 0.3 11/18/2024    TP 7.9 11/18/2024    ALB 5.1 (H) 11/18/2024    GLOBULIN 2.8 11/18/2024    AGRATIO 1.2 06/05/2015     11/18/2024    K 4.3 11/18/2024      11/18/2024    CO2 27.0 11/18/2024     Lab Results   Component Value Date    PTP 13.8 09/10/2024    INR 1.00 09/10/2024     Lab Results   Component Value Date    VITD 25.3 (L) 11/18/2024            [1]   Past Medical History:   Acute pyelonephritis    Hospitalized 2 weeks    Anesthesia complication    pt reports low BP    Colon polyps    repeat CLN in 5 years    Disorder of liver    abnormal LFTs, + AMA    Esophageal reflux    Meniere disease    SVT (supraventricular tachycardia) (HCC)    Status post ablation    Vestibular disorder    Vitamin B12 deficiency    weekly B12 therapy   [2]   Past Surgical History:  Procedure Laterality Date    Colonoscopy  11/23/2011    per YFN    Colonoscopy  2011        Colonoscopy N/A 4/14/2021    Procedure: COLONOSCOPY;  Surgeon: MARIKA Schmidt MD;  Location: [facility] ENDOSCOPY    Other      Abdominal fibroidectomy    Other surgical history  05/20/2022    DNC    Tubal ligation Bilateral 1990/1991/1993   [3]   Current Outpatient Medications   Medication Sig Dispense Refill    cyclobenzaprine 5 MG Oral Tab Take 1 tablet (5 mg total) by mouth nightly as needed for Muscle spasms.      Meloxicam 15 MG Oral Tab Take 1 tablet (15 mg total) by mouth daily as needed for Pain.      ergocalciferol 1.25 MG (49315 UT) Oral Cap Take 1 capsule (50,000 Units total) by mouth once a week. 12 capsule 0    clonazePAM 0.5 MG Oral Tab Take 0.5 tablets (0.25 mg total) by mouth daily. 30 tablet 2    triamcinolone 0.1 % External Ointment Apply 1 Application topically 2 (two) times daily.      nystatin 100,000 Units/g External Ointment Apply 1 Application topically 2 (two) times daily. 15 g 0    pantoprazole 40 MG Oral Tab EC Take 1 tablet (40 mg total) by mouth before breakfast.      meclizine 25 MG Oral Tab Take 1 tablet (25 mg total) by mouth 3 (three) times daily as needed. 90 tablet 1    montelukast 10 MG Oral Tab Take 1 tablet (10 mg total) by mouth nightly. 30 tablet 3    azelastine 0.1 %  Nasal Solution 2 sprays by Nasal route 2 (two) times daily. 30 mL 3    omeprazole 20 MG Oral Capsule Delayed Release Take 1 capsule (20 mg total) by mouth before breakfast. 90 capsule 0   [4]   Allergies  Allergen Reactions    Lawson Flavor RASH    Strawberry RASH   [5]   Family History  Problem Relation Age of Onset    Stroke Father 72        CVA    Hypertension Father     Stroke Mother 79        CVA    Hypertension Mother     Diabetes Sister         type 2 - half (P)    Colon Cancer Neg         close relative    Glaucoma Neg     Macular degeneration Neg    [6]   Social History  Socioeconomic History    Marital status:    Occupational History    Occupation: LPN   Tobacco Use    Smoking status: Some Days     Current packs/day: 0.00     Types: Cigarettes     Last attempt to quit: 1993     Years since quittin.5    Smokeless tobacco: Never    Tobacco comments:     occasionally   Vaping Use    Vaping status: Never Used   Substance and Sexual Activity    Alcohol use: Yes     Alcohol/week: 1.0 standard drink of alcohol     Types: 1 Standard drinks or equivalent per week     Comment: socially    Drug use: No    Sexual activity: Not Currently     Partners: Male     Birth control/protection: Tubal Ligation   Other Topics Concern    Caffeine Concern Yes     Comment: coffee, 1cup/day    Exercise No    Pt has a pacemaker No    Pt has a defibrillator No    Reaction to local anesthetic No   Social History Narrative    The patient does not use an assistive device..      The patient does live in a home with stairs.     Social Drivers of Health     Food Insecurity: No Food Insecurity (2025)    NCSS - Food Insecurity     Worried About Running Out of Food in the Last Year: No     Ran Out of Food in the Last Year: No   Transportation Needs: No Transportation Needs (2025)    NCSS - Transportation     Lack of Transportation: No   Housing Stability: At Risk (2025)    NCSS - Housing/Utilities     Has Housing:  No     Worried About Losing Housing: No     Unable to Get Utilities: No

## 2025-07-16 NOTE — TELEPHONE ENCOUNTER
Initiated authorization for Bilateral L4-5 and L5-S1 Facet joint injection under fluoroscopy guidance under IVCS. CPT/HCPCS 35136-37, 99645 x's 2 dx:M47.816 to be done at [facility] with [employer] portal.    Status: Approved  Reference/Authorization # [id number]  Valid: 7/16/25-9/14/25  Authorization is not a guarantee of payment and may be subject to review once claim is submitted.     PLEASE INFORM THE PATIENT TO CONTACT THE OFFICE IF THE INSURANCE CHANGES AS HE/SHE IS RESPONSIBLE TO UPDATE THE OFFICE IF INSURANCE CHANGES SO THAT PROCEDURE IS BILLED CORRECTLY.

## 2025-07-16 NOTE — PATIENT INSTRUCTIONS
-MRI of the sacrum/coccyx and hip and follow up after  -My office will call once injection is approved  -Continue home exercises

## (undated) DIAGNOSIS — R93.89 THICKENED ENDOMETRIUM: ICD-10-CM

## (undated) DIAGNOSIS — N84.0 ENDOMETRIAL POLYP: ICD-10-CM

## (undated) DIAGNOSIS — N95.0 PMB (POSTMENOPAUSAL BLEEDING): Primary | ICD-10-CM

## (undated) DIAGNOSIS — N84.1 CERVICAL POLYP: ICD-10-CM

## (undated) DEVICE — 35 ML SYRINGE REGULAR TIP: Brand: MONOJECT

## (undated) DEVICE — SET TUBI Y FL CNTRL INFL/OTFL

## (undated) DEVICE — STERILE SURGICAL LUBRICANT, METAL TUBE: Brand: SURGILUBE

## (undated) DEVICE — Device: Brand: DEFENDO AIR/WATER/SUCTION AND BIOPSY VALVE

## (undated) DEVICE — MASK PROC W/VISOR ANTIGLARE

## (undated) DEVICE — STIRRUP STRAP W/SLING RING

## (undated) DEVICE — MYOSURE SINGLE USE SEAL SET

## (undated) DEVICE — Device: Brand: CUSTOM PROCEDURE KIT

## (undated) DEVICE — FORCEP RADIAL JAW 4

## (undated) DEVICE — SOLUTION  .9 3000ML

## (undated) DEVICE — DEVICE SPEC RTRVL MYOSURE

## (undated) DEVICE — ENDOSCOPY PACK UPPER: Brand: MEDLINE INDUSTRIES, INC.

## (undated) DEVICE — SNARE ENDOSCOPIC 10MM ROUND

## (undated) DEVICE — SNARE OPTMZ PLPCTM TRP

## (undated) DEVICE — MEDI-VAC NON-CONDUCTIVE SUCTION TUBING 6MM X 1.8M (6FT.) L: Brand: CARDINAL HEALTH

## (undated) DEVICE — SOCK CNSTR 4IN TNPSL UNV SPEC

## (undated) DEVICE — ENCORE® LATEX MICRO SIZE 8.5, STERILE LATEX POWDER-FREE SURGICAL GLOVE: Brand: ENCORE

## (undated) DEVICE — HYSTEROSCOPY: Brand: MEDLINE INDUSTRIES, INC.

## (undated) DEVICE — LINE MNTR ADLT SET O2 INTMD

## (undated) DEVICE — SUCTION CANISTER, 3000CC,SAFELINER: Brand: DEROYAL

## (undated) DEVICE — ENCORE® LATEX ACCLAIM SIZE 8, STERILE LATEX POWDER-FREE SURGICAL GLOVE: Brand: ENCORE

## (undated) NOTE — Clinical Note
April 18, 2017    Ofe Hubbard MD  [facility],[address]     Patient: [facility]   YOB: 1965   Date of Visit: 4/18/2017       Dear Dr. Mike Jones MD:    Thank you for referring Davida Marrero to me for evaluation. TraMADol HCl 50 MG Oral Tab Take 1 tablet (50 mg total) by mouth every 6 (six) hours as needed for Pain.  Disp: 30 tablet Rfl: 1   benzonatate (TESSALON PERLES) 100 MG Oral Cap Take 1 capsule (100 mg total) by mouth 3 (three) times daily as needed for cough Conjunctiva/Sclera  Nasal Pinguecula Nasal/temp pinguecula    Cornea  No Krukenberg's spindle  No Krukenberg's spindle    Anterior Chamber Deep and quiet Deep and quiet    Iris No transillumination defects No transillumination defects    Lens Clear Clear

## (undated) NOTE — LETTER
MINOR CASE LETTER      2022        Dear Raquel Reza,    Your are having a Cervical polypectomy followed by operative hysteroscopy, Myosure polypectomy followed by dilation and curettage on Friday,2022 at 11:00am at [facility].    Do not eat or drink anything (including water) after midnight the night before surgery. If your procedure is scheduled later in the day, then nothing by mouth for 6 hours before arrival to the hospital.    Tarun Lopez are to call this office if you have any cold or flu symptoms 2 days before your scheduled surgery. Please avoid ALL aspirin and herbal supplements 7 days before surgery. Avoid Ibuprofen, Motrin, Aleve, or Naprosyn for 3 days before surgery. Please avoid ALL Cannabis use 24 hours prior to surgery. You cannot wear hair pins,wigs,artificial nail or metallic nails/ nail polish for surgery. You will be contacted by PreAdmission Testing (PAT) usually within the week before surgery. They will take a short medical history and let you know if any preoperative testing is needed. If you have any questions for preadmission testing please feel free to contact them directly by calling [phone]. In accordance with IDPH guidelines we must ask that you self-quarantine as best as possible until the day of your surgical/non-surgical procedure once you have been tested for   COVID( testing is performed with 72 hours of the scheduled procedure). It is important to take precautions against the spread of COVID-19 disease both before   and after your surgical procedure. We ask that you adhere to the followin. Avoid crowds  2. Wear a mask or face covering in public  3. Maintain social distancing  4. Practice good hygiene    I contacted your insurance and was advised no prior authorization is needed for surgery. Please make arrangements for someone to drive you home after your surgery.     Call our office now to schedule your post-operative appointment for 2 weeks after surgery. Please feel free to contact our office at  if you have any questions regarding these instructions or your procedure. Sincerely,          Maksim Groves.  [facility], [address], [place]  [place] [id number] [phone]    Document electronically generated by:  Josselyn Zhu

## (undated) NOTE — MR AVS SNAPSHOT
[place]  [address]  [phone]               Thank you for choosing us for your health care visit with Kassidy Davila MD.  We are glad to serve you and happy to provide you with this summa Fluticasone Propionate 50 MCG/ACT Susp   1 spray by Nasal route 2 (two) times daily. Commonly known as:  FLONASE           guaiFENesin-codeine 100-10 MG/5ML Soln   Take 10 mL by mouth every 6 (six) hours as needed for cough.    Commonly known as:  Maureen Marcel

## (undated) NOTE — LETTER
8/14/2018              [facility]        [address]         Dear Satish Gustafson,    I reviewed the pathology report from the biopsies done during your recent upper endoscopy.  Based on the report, you have NO evidence of H.pylor

## (undated) NOTE — LETTER
To Whom it concern,      This is to certify that Cristine Rojoromulo who has been under my medical care for a few years. She has been advised  not to a work full time job . Due to her meniere desires and severe vertigo. If there are any questions or concerns please call E[place] office.     Sincerely,    Nagi Baker MD

## (undated) NOTE — LETTER
Date & Time: 5/9/2022, 12:14 PM  Patient: Da Carrasco  Encounter Provider(s): Yas Campos MD       To Whom It May Concern:    Delia Morelos was seen and treated in our department on 5/9/2022. She should not return to work until 05/10/2022.     If you have any questions or concerns, please do not hesitate to call.        _____________________________  Physician/APC Signature

## (undated) NOTE — MR AVS SNAPSHOT
[address] [facility]  [phone]               Thank you for choosing us for your health care visit with Rob Grimaldo.  Irasema Juarez MD.  We are glad to serve you and happy to provide you with this summar No Known Allergies                Today's Vital Signs     BP Pulse Temp Height Weight BMI    90/60 mmHg 76 97 °F (36.1 °C) (Tympanic) 5' 3\" (1.6 m) 156 lb (70.761 kg) 27.64 kg/m2         Current Medications          This list is accurate as of: 2/7/17 11 Eat plenty of low-fat dairy products High fat meats and dairy   Choose whole grain products Foods high in sodium   Water is best for hydration Fast food.    Eat at home when possible     Tips for increasing your physical activity – Adults who are physically

## (undated) NOTE — LETTER
MINOR CASE LETTER      2022        Dear Adria Bryant,    Your are having a Cervical polypectomy followed by operative hysteroscopy, Myosure polypectomy followed by dilation and curettage on Friday,2022 at 11:00am at [facility].    Do not eat or drink anything (including water) after midnight the night before surgery. If your procedure is scheduled later in the day, then nothing by mouth for 6 hours before arrival to the hospital.    Kp Killian are to call this office if you have any cold or flu symptoms 2 days before your scheduled surgery. Please avoid ALL aspirin and herbal supplements 7 days before surgery. Avoid Ibuprofen, Motrin, Aleve, or Naprosyn for 3 days before surgery. Please avoid ALL Cannabis use 24 hours prior to surgery. You cannot wear hair pins,wigs,artificial nail or metallic nails/ nail polish for surgery. You will be contacted by PreAdmission Testing (PAT) usually within the week before surgery. They will take a short medical history and let you know if any preoperative testing is needed. If you have any questions for preadmission testing please feel free to contact them directly by calling [phone]. In accordance with IDPH guidelines we must ask that you self-quarantine as best as possible until the day of your surgical/non-surgical procedure once you have been tested for   COVID( testing is performed with 72 hours of the scheduled procedure). It is important to take precautions against the spread of COVID-19 disease both before   and after your surgical procedure. We ask that you adhere to the followin. Avoid crowds  2. Wear a mask or face covering in public  3. Maintain social distancing  4. Practice good hygiene    I contacted your insurance and was advised no prior authorization is needed for surgery. Please make arrangements for someone to drive you home after your surgery.     Call our office now to schedule your post-operative appointment for 2 weeks after surgery. Please feel free to contact our office at [phone] if you have any questions regarding these instructions or your procedure. Sincerely,          Odessa Garcia.  [address] [place] [id number] [phone]    Document electronically generated by:  Krista Lorenzana

## (undated) NOTE — ED AVS SNAPSHOT
Stella Monsivais   MRN: [id number]    Department:  [facility] Emergency Department   Date of Visit:  10/8/2018           Disclosure     Insurance plans vary and the physician(s) referred by the ER may not be covered by your plan.  Please contact CARE PHYSICIAN AT ONCE OR RETURN IMMEDIATELY TO THE EMERGENCY DEPARTMENT. If you have been prescribed any medication(s), please fill your prescription right away and begin taking the medication(s) as directed.   If you believe that any of the medications

## (undated) NOTE — LETTER
12/23/20        [address]  [address]      Dear Katarina Copeland records indicate that you have outstanding lab work and or testing that was ordered for you and has not yet been completed:     ACTIN (Felicia 22) ANTIBODY

## (undated) NOTE — LETTER
AUTHORIZATION FOR SURGICAL OPERATION OR OTHER PROCEDURE    1. I hereby authorize Dr. Trinh Queen and [facility] staff assigned to my case to perform the following operation and/or procedure at the [employer] [facility]:    ENDOMETRIAL BIOPSY      _______________________________________________________________________________________________    2. My physician has explained the nature and purpose of the operation or other procedure, possible alternative methods of treatment, the risks involved, and the possibility of complication to me. I acknowledge that no guarantee has been made as to the result that may be obtained. 3.  I recognize that, during the course of this operation, or other procedure, unforseen conditions may necessitate additional or different procedure than those listed above. I, therefore, further authorize and request that the above named physician, his/her physician assistants or designees perform such procedures as are, in his/her professional opinion, necessary and desirable. 4.  Any tissue or organs removed in the operation or other procedure may be disposed of by and at the discretion of the [facility] and [facility]. 5.  I understand that in the event of a medical emergency, I will be transported by local paramedics to [facility] or other hospital emergency department. 6.  I certify that I have read and fully understand the above consent to operation and/or other procedure. 7.  I acknowledge that my physician has explained sedation/analgesia administration to me including the risks and benefits. I consent to the administration of sedation/analgesia as may be necessary or desirable in the judgement of my physician. Witness signature: ___________________________________________________ Date:  ______/______/_____                    Time:  ________ A. M.  P.M.        Patient Name: ______________________________________________________  (please print)      Patient signature:  ___________________________________________________             Relationship to Patient:           []  Parent    Responsible person                          []  Spouse  In case of minor or                    [] Other  _____________   Incompetent name:  __________________________________________________                               (please print)      _____________      Responsible person  In case of minor or  Incompetent signature:  _______________________________________________    Statement of Physician  My signature below affirms that prior to the time of the procedure, I have explained to the patient and/or his/her guardian, the risks and benefits involved in the proposed treatment and any reasonable alternative to the proposed treatment. I have also explained the risks and benefits involved in the refusal of the proposed treatment and have answered the patient's questions.                         Date:  ______/______/_______  Provider                      Signature:  __________________________________________________________       Time:  ___________ A.M    P.M.

## (undated) NOTE — LETTER
11/30/21        [address]  [address] [address]      Dear Rafa Nunez records indicate that you have outstanding lab work and or testing that was ordered for you and has not yet been completed:  Orders Placed This Encounter

## (undated) NOTE — MR AVS SNAPSHOT
[place]  [address]  [phone]               Thank you for choosing us for your health care visit with Selin Ray MD.  We are glad to serve you and happy to provide you with this summary 1 spray by Nasal route 2 (two) times daily. Commonly known as:  FLONASE           guaiFENesin-codeine 100-10 MG/5ML Soln   Take 10 mL by mouth every 6 (six) hours as needed for cough.    Commonly known as:  CHERATUSSIN AC           loratadine 10 MG Tabs

## (undated) NOTE — MR AVS SNAPSHOT
[facility] [place] [address] [address]  [phone]               Thank you for choosing us for your health care visit with Arlen Rodriguez MD.  We are glad to serve you and happy to provide you with this summary o

## (undated) NOTE — MR AVS SNAPSHOT
[address], [address]  [address] [facility]  [phone]               Thank you for choosing us for your health care visit with Dara Goodwin MD.  We are glad to serve you and happy to provide you with this summary [address], [address], [address], [address]     [phone]      [address]  [address], [facility] Brian Yessica Said, I authorization, such as [place], please feel free to schedule your appointment immediately. However, if you are unsure about the requirements for authorization, please wait 5-7 days and then contact your physician's office.  At that time, you will GLUCOSE (URINE DIPSTICK) Negative Negative mg/dL    BILIRUBIN Negative Negative    KETONES (URINE DIPSTICK) Negative Negative mg/dL    SPECIFIC GRAVITY 1.025 1.005 - 1.030    OCCULT BLOOD Positive Negative    PH, URINE 6.0 4.5 - 8.0    PROTEIN (URINE DIPS

## (undated) NOTE — LETTER
3/17/2017              Zane Malave [facility]        Cora Britt         Dear Maya Louis,      It was a pleasure to see you at our [place] [id number], [facility] office.   Your Mammogram is normal. T

## (undated) NOTE — LETTER
03/13/20        [address]  [address]      Dear Rob Browning,    [address] [facility] records indicate that you have outstanding lab work and or testing that was ordered for you and has not yet been completed:  Orders Placed This Encounter

## (undated) NOTE — LETTER
7/19/2017              Jodi Carballo [facility]        Yuliya Viera         Dear Sana Palmer,  ? Our records indicate that you had an appointment on 5/09/2017 with Cisco Malik MD that you failed to keep.   Your healthcare is very

## (undated) NOTE — Clinical Note
3/21/2017          To Whom It May Concern:    Stella Monsivais is currently under my medical care and may not return to work at this time. Please excuse Chantel Sen for 2 days. She may return to work on 3/23/17. Activity is restricted as follows: none.     I

## (undated) NOTE — LETTER
MINOR CASE LETTER      2022        Dear Ravi Learn,    Your are having a Cervical polypectomy followed by operative hysteroscopy, Myosure polypectomy followed by dilation and curettage on Friday,2022 at 1:00pm at [facility].    Do not eat or drink anything (including water) after midnight the night before surgery. If your procedure is scheduled later in the day, then nothing by mouth for 6 hours before arrival to the hospital.    Junior Alejandro are to call this office if you have any cold or flu symptoms 2 days before your scheduled surgery. Please avoid ALL aspirin and herbal supplements 7 days before surgery. Avoid Ibuprofen, Motrin, Aleve, or Naprosyn for 3 days before surgery. Please avoid ALL Cannabis use 24 hours prior to surgery. You cannot wear hair pins,wigs,artificial nail or metallic nails/ nail polish for surgery. You will be contacted by PreAdmission Testing (PAT) usually within the week before surgery. They will take a short medical history and let you know if any preoperative testing is needed. If you have any questions for preadmission testing please feel free to contact them directly by calling [phone]. In accordance with IDPH guidelines we must ask that you self-quarantine as best as possible until the day of your surgical/non-surgical procedure once you have been tested for   COVID( testing is performed with 72 hours of the scheduled procedure). It is important to take precautions against the spread of COVID-19 disease both before   and after your surgical procedure. We ask that you adhere to the followin. Avoid crowds  2. Wear a mask or face covering in public  3. Maintain social distancing  4. Practice good hygiene    I contacted your insurance and was advised no prior authorization is needed for surgery. Please make arrangements for someone to drive you home after your surgery.     Call our office now to schedule your post-operative appointment for 2 weeks after surgery. Please feel free to contact our office at  if you have any questions regarding these instructions or your procedure. Sincerely,          Madlyn Jeans.  [facility], [address] [place] [address]  [address] [facility] [id number] [phone]    Document electronically generated by:  Marco Garrison